# Patient Record
Sex: MALE | Race: WHITE | NOT HISPANIC OR LATINO | Employment: OTHER | URBAN - METROPOLITAN AREA
[De-identification: names, ages, dates, MRNs, and addresses within clinical notes are randomized per-mention and may not be internally consistent; named-entity substitution may affect disease eponyms.]

---

## 2017-05-23 DIAGNOSIS — N52.9 ERECTILE DYSFUNCTION, UNSPECIFIED ERECTILE DYSFUNCTION TYPE: Primary | ICD-10-CM

## 2017-05-24 DIAGNOSIS — N52.9 ERECTILE DYSFUNCTION, UNSPECIFIED ERECTILE DYSFUNCTION TYPE: Primary | ICD-10-CM

## 2017-05-24 RX ORDER — TADALAFIL 20 MG/1
20 TABLET ORAL DAILY PRN
Qty: 10 TABLET | Refills: 11 | Status: SHIPPED | OUTPATIENT
Start: 2017-05-24 | End: 2017-07-18

## 2017-05-24 RX ORDER — SILDENAFIL CITRATE 100 MG/1
100 TABLET, FILM COATED ORAL DAILY PRN
Qty: 10 TABLET | Refills: 11 | Status: SHIPPED | OUTPATIENT
Start: 2017-05-24 | End: 2017-05-24 | Stop reason: SDUPTHER

## 2017-05-24 RX ORDER — TADALAFIL 20 MG/1
20 TABLET ORAL DAILY PRN
Qty: 10 TABLET | Refills: 11 | Status: CANCELLED | OUTPATIENT
Start: 2017-05-24 | End: 2018-05-24

## 2017-05-24 NOTE — TELEPHONE ENCOUNTER
Spoke to pt.  Insurance will not cover viagra, but will cover cialis 20 mg.  I called [facility] and cancelled viagra

## 2017-07-18 ENCOUNTER — OFFICE VISIT (OUTPATIENT)
Dept: UROLOGY | Facility: CLINIC | Age: 62
End: 2017-07-18
Attending: UROLOGY
Payer: COMMERCIAL

## 2017-07-18 VITALS
HEART RATE: 60 BPM | DIASTOLIC BLOOD PRESSURE: 81 MMHG | BODY MASS INDEX: 28.63 KG/M2 | HEIGHT: 70 IN | WEIGHT: 200 LBS | SYSTOLIC BLOOD PRESSURE: 134 MMHG

## 2017-07-18 DIAGNOSIS — N52.9 ERECTILE DYSFUNCTION, UNSPECIFIED ERECTILE DYSFUNCTION TYPE: Primary | ICD-10-CM

## 2017-07-18 DIAGNOSIS — N40.0 ENLARGED PROSTATE WITHOUT LOWER URINARY TRACT SYMPTOMS (LUTS): ICD-10-CM

## 2017-07-18 PROCEDURE — 99214 OFFICE O/P EST MOD 30 MIN: CPT | Mod: S$GLB,,, | Performed by: UROLOGY

## 2017-07-18 PROCEDURE — 99999 PR PBB SHADOW E&M-EST. PATIENT-LVL III: CPT | Mod: PBBFAC,,, | Performed by: UROLOGY

## 2017-07-18 RX ORDER — SILDENAFIL 100 MG/1
100 TABLET, FILM COATED ORAL DAILY PRN
Qty: 10 TABLET | Refills: 11 | Status: SHIPPED | OUTPATIENT
Start: 2017-07-18 | End: 2018-06-25 | Stop reason: SDUPTHER

## 2017-07-18 RX ORDER — OLMESARTAN MEDOXOMIL 40 MG/1
40 TABLET, FILM COATED ORAL NIGHTLY
Refills: 3 | COMMUNITY
Start: 2017-06-21 | End: 2019-09-23 | Stop reason: SDUPTHER

## 2017-07-18 NOTE — PROGRESS NOTES
"Subjective:      Giovanni Moncada is a 61 y.o. male who returns today regarding his     No urinary complaints.  The patient has tried Viagra 100 mg and this works well.  He prefers this to Cialis..    The following portions of the patient's history were reviewed and updated as appropriate: allergies, current medications, past family history, past medical history, past social history, past surgical history and problem list.    Review of Systems  Pertinent items are noted in HPI.  A comprehensive multipoint review of systems was negative except as otherwise stated in the HPI.     Objective:   Vitals: /81 (BP Location: Right arm, Patient Position: Sitting, BP Method: Automatic)   Pulse 60   Ht 5' 10" (1.778 m)   Wt 90.7 kg (200 lb)   BMI 28.70 kg/m²     Physical Exam   General: alert and oriented, no acute distress  Respiratory: Symmetric expansion, non-labored breathing  Cardiovascular: no peripheral edema  Abdomen:  non distended  Skin: normal coloration and turgor, no rashes, no suspicious skin lesions noted  Neuro: no gross deficits  Psych: normal judgment and insight, normal mood/affect and non-anxious  Prostate 35-40 g no nodules  Physical Exam    Lab Review   Urinalysis demonstrates no specimen    PSA 1.1, last year 0.95  No results found for: WBC, HGB, HCT, MCV, PLT  No results found for: CREATININE, BUN    Imaging  -  Assessment and Plan:   Erectile dysfunction, unspecified erectile dysfunction type  -     Prostate Specific Antigen, Diagnostic; Future; Expected date: 07/16/2018    Enlarged prostate without lower urinary tract symptoms (luts)  -     Prostate Specific Antigen, Diagnostic; Future; Expected date: 07/16/2018    Other orders  -     sildenafil (VIAGRA) 100 MG tablet; Take 1 tablet (100 mg total) by mouth daily as needed for Erectile Dysfunction.  Dispense: 10 tablet; Refill: 11      returned to clinic 1 year with PSA    "

## 2017-07-18 NOTE — PATIENT INSTRUCTIONS
Sildenafil tablets (Viagra)  What is this medicine?  SILDENAFIL (abida DEN a silvio) is used to treat erection problems in men.  How should I use this medicine?  Take this medicine by mouth with a glass of water. Follow the directions on the prescription label. The dose is usually taken 1 hour before sexual activity. You should not take the dose more than once per day. Do not take your medicine more often than directed.  Talk to your pediatrician regarding the use of this medicine in children. This medicine is not used in children for this condition.  What side effects may I notice from receiving this medicine?  Side effects that you should report to your doctor or health care professional as soon as possible:  · allergic reactions like skin rash, itching or hives, swelling of the face, lips, or tongue  · breathing problems  · changes in hearing  · changes in vision  · chest pain  · fast, irregular heartbeat  · prolonged or painful erection  · seizures  Side effects that usually do not require medical attention (report to your doctor or health care professional if they continue or are bothersome):  · back pain  · dizziness  · flushing  · headache  · indigestion  · muscle aches  · nausea  · stuffy or runny nose  What may interact with this medicine?  Do not take this medicine with any of the following medications:  · cisapride  · methscopolamine nitrate  · nitrates like amyl nitrite, isosorbide dinitrate, isosorbide mononitrate, nitroglycerin  · nitroprusside  · other medicines for erectile dysfunction like avanafil, tadalafil, vardenafil  · riociguat  · other sildenafil products (Revatio)  This medicine may also interact with the following medications:  · certain drugs for high blood pressure  · certain drugs for the treatment of HIV infection or AIDS  · certain drugs used for fungal or yeast infections, like fluconazole, itraconazole, ketoconazole, and voriconazole  · cimetidine  · erythromycin  · rifampin  What if I  miss a dose?  This does not apply. Do not take double or extra doses.  Where should I keep my medicine?  Keep out of reach of children.  Store at room temperature between 15 and 30 degrees C (59 and 86 degrees F). Throw away any unused medicine after the expiration date.  What should I tell my health care provider before I take this medicine?  They need to know if you have any of these conditions:  · bleeding disorders  · eye or vision problems, including a rare inherited eye disease called retinitis pigmentosa  · anatomical deformation of the penis, Peyronie's disease, or history of priapism (painful and prolonged erection)  · heart disease, angina, a history of heart attack, irregular heart beats, or other heart problems  · high or low blood pressure  · history of blood diseases, like sickle cell anemia or leukemia  · history of stomach bleeding  · kidney disease  · liver disease  · stroke  · an unusual or allergic reaction to sildenafil, other medicines, foods, dyes, or preservatives  · pregnant or trying to get pregnant  · breast-feeding  What should I watch for while using this medicine?  If you notice any changes in your vision while taking this drug, call your doctor or health care professional as soon as possible. Stop using this medicine and call your health care provider right away if you have a loss of sight in one or both eyes.  Contact your doctor or health care professional right away if you have an erection that lasts longer than 4 hours or if it becomes painful. This may be a sign of a serious problem and must be treated right away to prevent permanent damage.  If you experience symptoms of nausea, dizziness, chest pain or arm pain upon initiation of sexual activity after taking this medicine, you should refrain from further activity and call your doctor or health care professional as soon as possible.  Do not drink alcohol to excess (examples, 5 glasses of wine or 5 shots of whiskey) when taking  this medicine. When taken in excess, alcohol can increase your chances of getting a headache or getting dizzy, increasing your heart rate or lowering your blood pressure.  Using this medicine does not protect you or your partner against HIV infection (the virus that causes AIDS) or other sexually transmitted diseases.  Date Last Reviewed:   NOTE:This sheet is a summary. It may not cover all possible information. If you have questions about this medicine, talk to your doctor, pharmacist, or health care provider. Copyright© 2016 [employer]

## 2017-08-24 ENCOUNTER — TELEPHONE (OUTPATIENT)
Dept: UROLOGY | Facility: CLINIC | Age: 62
End: 2017-08-24

## 2017-08-24 NOTE — TELEPHONE ENCOUNTER
Spoke to the patient and he states that his cardio doctor gave him cialis so disregard the PA for viagra/ds

## 2018-04-23 ENCOUNTER — TELEPHONE (OUTPATIENT)
Dept: UROLOGY | Facility: CLINIC | Age: 63
End: 2018-04-23

## 2018-04-23 DIAGNOSIS — N40.0 BENIGN PROSTATIC HYPERPLASIA, UNSPECIFIED WHETHER LOWER URINARY TRACT SYMPTOMS PRESENT: Primary | ICD-10-CM

## 2018-04-23 NOTE — TELEPHONE ENCOUNTER
----- Message from Jesse Villafana sent at 4/23/2018  3:37 PM CDT -----  Contact: Kirsten (daughter)    Name of Who is Calling: Kirsten (daughter)      What is the request in detail: Patient would like to speak with staff to have is PSA orders put in for his yearly appointment in July      Can the clinic reply by [facility]: no      What Number to Call Back if not in [facility]: [phone] or [phone]

## 2018-06-25 RX ORDER — TADALAFIL 20 MG/1
TABLET, FILM COATED ORAL
Qty: 10 TABLET | Refills: 11 | Status: SHIPPED | OUTPATIENT
Start: 2018-06-25 | End: 2018-07-20 | Stop reason: SDUPTHER

## 2018-07-16 ENCOUNTER — LAB VISIT (OUTPATIENT)
Dept: LAB | Facility: HOSPITAL | Age: 63
End: 2018-07-16
Attending: UROLOGY
Payer: COMMERCIAL

## 2018-07-16 DIAGNOSIS — N40.0 BENIGN PROSTATIC HYPERPLASIA, UNSPECIFIED WHETHER LOWER URINARY TRACT SYMPTOMS PRESENT: ICD-10-CM

## 2018-07-16 LAB — COMPLEXED PSA SERPL-MCNC: 1.5 NG/ML

## 2018-07-16 PROCEDURE — 36415 COLL VENOUS BLD VENIPUNCTURE: CPT | Mod: PO

## 2018-07-16 PROCEDURE — 84153 ASSAY OF PSA TOTAL: CPT

## 2018-07-20 ENCOUNTER — OFFICE VISIT (OUTPATIENT)
Dept: UROLOGY | Facility: CLINIC | Age: 63
End: 2018-07-20
Attending: UROLOGY
Payer: COMMERCIAL

## 2018-07-20 VITALS
BODY MASS INDEX: 28.14 KG/M2 | DIASTOLIC BLOOD PRESSURE: 78 MMHG | HEART RATE: 55 BPM | SYSTOLIC BLOOD PRESSURE: 130 MMHG | WEIGHT: 190 LBS | HEIGHT: 69 IN

## 2018-07-20 DIAGNOSIS — N40.0 ENLARGED PROSTATE WITHOUT LOWER URINARY TRACT SYMPTOMS (LUTS): Primary | ICD-10-CM

## 2018-07-20 DIAGNOSIS — N52.9 ERECTILE DYSFUNCTION, UNSPECIFIED ERECTILE DYSFUNCTION TYPE: ICD-10-CM

## 2018-07-20 PROCEDURE — 99214 OFFICE O/P EST MOD 30 MIN: CPT | Mod: S$GLB,,, | Performed by: UROLOGY

## 2018-07-20 PROCEDURE — 3008F BODY MASS INDEX DOCD: CPT | Mod: CPTII,S$GLB,, | Performed by: UROLOGY

## 2018-07-20 RX ORDER — ASPIRIN 81 MG/1
81 TABLET ORAL DAILY
COMMUNITY

## 2018-07-20 RX ORDER — AMLODIPINE BESYLATE 5 MG/1
5 TABLET ORAL EVERY MORNING
Refills: 12 | COMMUNITY
Start: 2018-06-25 | End: 2019-09-23 | Stop reason: SDUPTHER

## 2018-07-20 RX ORDER — SILDENAFIL 100 MG/1
100 TABLET, FILM COATED ORAL DAILY PRN
Qty: 12 TABLET | Refills: 11 | Status: SHIPPED | OUTPATIENT
Start: 2018-07-20 | End: 2019-05-21 | Stop reason: SDUPTHER

## 2018-07-20 RX ORDER — DOXAZOSIN 2 MG/1
1 TABLET ORAL NIGHTLY
Refills: 12 | COMMUNITY
Start: 2018-05-17 | End: 2019-09-23 | Stop reason: SDUPTHER

## 2018-07-20 NOTE — PROGRESS NOTES
"Subjective:      Giovanni Moncada is a 62 y.o. male who returns today regarding his     Urgency with caffeine    Otherwise no complaints    Requests viagra 100mg for ED.    Ejaculated prior to recent blood draw    The following portions of the patient's history were reviewed and updated as appropriate: allergies, current medications, past family history, past medical history, past social history, past surgical history and problem list.    Review of Systems  Pertinent items are noted in HPI.  A comprehensive multipoint review of systems was negative except as otherwise stated in the HPI.     Objective:   Vitals: /78   Pulse (!) 55   Ht 5' 9" (1.753 m)   Wt 86.2 kg (190 lb)   BMI 28.06 kg/m²     Physical Exam   General: alert and oriented, no acute distress  Respiratory: Symmetric expansion, non-labored breathing  Cardiovascular: no peripheral edema  Abdomen: non distended -  Skin: normal coloration and turgor, no rashes, no suspicious skin lesions noted  Neuro: no gross deficits  Psych: normal judgment and insight, normal mood/affect and non-anxious  ES 25g no nodules    Physical Exam    Lab Review   Urinalysis demonstrates no specimen    Lab Results   Component Value Date    PSADIAG 1.5 07/16/2018    PSADIAG 0.95 09/23/2016       No results found for: WBC, HGB, HCT, MCV, PLT  No results found for: CREATININE, BUN    Imaging  -  Assessment and Plan:   Enlarged prostate without lower urinary tract symptoms (luts)  -     Prostate Specific Antigen, Diagnostic; Future; Expected date: 01/16/2019    Erectile dysfunction, unspecified erectile dysfunction type  -     Prostate Specific Antigen, Diagnostic; Future; Expected date: 01/16/2019    Other orders  -     sildenafil (VIAGRA) 100 MG tablet; Take 1 tablet (100 mg total) by mouth daily as needed for Erectile Dysfunction.  Dispense: 12 tablet; Refill: 11      rtc 6 months with psa  aviod ejaculating 3 days prior to psa  If PSA ok in 6 months rtc yearly with " psa

## 2019-05-21 ENCOUNTER — OFFICE VISIT (OUTPATIENT)
Dept: UROLOGY | Facility: CLINIC | Age: 64
End: 2019-05-21
Payer: COMMERCIAL

## 2019-05-21 VITALS
HEART RATE: 60 BPM | DIASTOLIC BLOOD PRESSURE: 75 MMHG | WEIGHT: 190 LBS | HEIGHT: 69 IN | BODY MASS INDEX: 28.14 KG/M2 | SYSTOLIC BLOOD PRESSURE: 131 MMHG

## 2019-05-21 DIAGNOSIS — N40.0 ENLARGED PROSTATE WITHOUT LOWER URINARY TRACT SYMPTOMS (LUTS): Primary | ICD-10-CM

## 2019-05-21 DIAGNOSIS — N52.9 ERECTILE DYSFUNCTION, UNSPECIFIED ERECTILE DYSFUNCTION TYPE: ICD-10-CM

## 2019-05-21 LAB
BILIRUB SERPL-MCNC: NORMAL MG/DL
BLOOD URINE, POC: NORMAL
COLOR, POC UA: NORMAL
GLUCOSE UR QL STRIP: NORMAL
KETONES UR QL STRIP: NORMAL
LEUKOCYTE ESTERASE URINE, POC: NORMAL
NITRITE, POC UA: NORMAL
PH, POC UA: 5
POC RESIDUAL URINE VOLUME: 17 ML (ref 0–100)
PROTEIN, POC: NORMAL
SPECIFIC GRAVITY, POC UA: 1.02
UROBILINOGEN, POC UA: NORMAL

## 2019-05-21 PROCEDURE — 99214 OFFICE O/P EST MOD 30 MIN: CPT | Mod: 25,S$GLB,, | Performed by: UROLOGY

## 2019-05-21 PROCEDURE — 81002 POCT URINE DIPSTICK WITHOUT MICROSCOPE: ICD-10-PCS | Mod: S$GLB,,, | Performed by: UROLOGY

## 2019-05-21 PROCEDURE — 51798 US URINE CAPACITY MEASURE: CPT | Mod: S$GLB,,, | Performed by: UROLOGY

## 2019-05-21 PROCEDURE — 3008F BODY MASS INDEX DOCD: CPT | Mod: CPTII,S$GLB,, | Performed by: UROLOGY

## 2019-05-21 PROCEDURE — 51798 POCT BLADDER SCAN: ICD-10-PCS | Mod: S$GLB,,, | Performed by: UROLOGY

## 2019-05-21 PROCEDURE — 3008F PR BODY MASS INDEX (BMI) DOCUMENTED: ICD-10-PCS | Mod: CPTII,S$GLB,, | Performed by: UROLOGY

## 2019-05-21 PROCEDURE — 81002 URINALYSIS NONAUTO W/O SCOPE: CPT | Mod: S$GLB,,, | Performed by: UROLOGY

## 2019-05-21 PROCEDURE — 99214 PR OFFICE/OUTPT VISIT, EST, LEVL IV, 30-39 MIN: ICD-10-PCS | Mod: 25,S$GLB,, | Performed by: UROLOGY

## 2019-05-21 RX ORDER — PROMETHAZINE HYDROCHLORIDE 25 MG/1
TABLET ORAL
Refills: 0 | COMMUNITY
Start: 2019-04-16 | End: 2019-05-21

## 2019-05-21 RX ORDER — SILDENAFIL 100 MG/1
100 TABLET, FILM COATED ORAL DAILY PRN
Qty: 12 TABLET | Refills: 11 | Status: SHIPPED | OUTPATIENT
Start: 2019-05-21 | End: 2019-09-23

## 2019-05-21 RX ORDER — SULFAMETHOXAZOLE AND TRIMETHOPRIM 800; 160 MG/1; MG/1
1 TABLET ORAL 2 TIMES DAILY WITH MEALS
Refills: 0 | COMMUNITY
Start: 2019-04-16 | End: 2019-05-21

## 2019-05-21 RX ORDER — IBUPROFEN 100 MG/5ML
1000 SUSPENSION, ORAL (FINAL DOSE FORM) ORAL
COMMUNITY
Start: 2015-12-23

## 2019-05-21 RX ORDER — GLUCOSAMINE/CHONDROITIN/C/MANG 500-400 MG
CAPSULE ORAL
COMMUNITY
Start: 2014-02-25

## 2019-05-21 RX ORDER — OXYCODONE AND ACETAMINOPHEN 10; 325 MG/1; MG/1
TABLET ORAL
Refills: 0 | COMMUNITY
Start: 2019-04-16 | End: 2019-05-21

## 2019-05-21 RX ORDER — LANOLIN ALCOHOL/MO/W.PET/CERES
100 CREAM (GRAM) TOPICAL DAILY
COMMUNITY

## 2019-05-21 RX ORDER — METHOCARBAMOL 750 MG/1
TABLET, FILM COATED ORAL
Refills: 0 | COMMUNITY
Start: 2019-04-16 | End: 2019-05-21

## 2019-05-21 RX ORDER — ACETAMINOPHEN 500 MG
TABLET ORAL
COMMUNITY
Start: 2015-12-23

## 2019-05-21 NOTE — PROGRESS NOTES
"Subjective:      Giovanni Moncada is a 63 y.o. male who returns today regarding his     Erectile dysfunction.  Sildenafil 100 mg provides adequate but not ideal erections.  He tried Cialis in the past with poor results    Two episodes of urinary frequency but currently he has none.  No complaints at this time    The following portions of the patient's history were reviewed and updated as appropriate: allergies, current medications, past family history, past medical history, past social history, past surgical history and problem list.    Review of Systems  Pertinent items are noted in HPI.  A comprehensive multipoint review of systems was negative except as otherwise stated in the HPI.     Objective:   Vitals: /75 (BP Location: Left arm, Patient Position: Sitting, BP Method: Large (Automatic))   Pulse 60   Ht 5' 9" (1.753 m)   Wt 86.2 kg (190 lb)   BMI 28.06 kg/m²     Physical Exam   General: alert and oriented, no acute distress  Respiratory: Symmetric expansion, non-labored breathing  Cardiovascular: normal to inspection  Abdomen: non distended   Skin: normal coloration and turgor, no rashes, no suspicious skin lesions noted  Neuro: no gross deficits  Psych: normal judgment and insight, normal mood/affect and non-anxious    Physical Exam    Lab Review   Urinalysis demonstrates negative for all components  No results found for: WBC, HGB, HCT, MCV, PLT  No results found for: CREATININE, BUN    PSA 1.08 March 2019    Imaging  Postvoid residual 17 cc  Assessment and Plan:   Enlarged prostate without lower urinary tract symptoms (luts)    Erectile dysfunction, unspecified erectile dysfunction type     Continue Viagra 100 mg p.r.n.  Return to clinic 1 year with PSA for prostate exam    "

## 2019-09-23 ENCOUNTER — LAB VISIT (OUTPATIENT)
Dept: LAB | Facility: HOSPITAL | Age: 64
End: 2019-09-23
Attending: INTERNAL MEDICINE
Payer: COMMERCIAL

## 2019-09-23 ENCOUNTER — OFFICE VISIT (OUTPATIENT)
Dept: PRIMARY CARE CLINIC | Facility: CLINIC | Age: 64
End: 2019-09-23
Payer: COMMERCIAL

## 2019-09-23 VITALS
HEART RATE: 61 BPM | BODY MASS INDEX: 28.87 KG/M2 | DIASTOLIC BLOOD PRESSURE: 80 MMHG | SYSTOLIC BLOOD PRESSURE: 112 MMHG | OXYGEN SATURATION: 95 % | HEIGHT: 69 IN | TEMPERATURE: 98 F | WEIGHT: 194.88 LBS

## 2019-09-23 DIAGNOSIS — Z85.828 HISTORY OF SCC (SQUAMOUS CELL CARCINOMA) OF SKIN: ICD-10-CM

## 2019-09-23 DIAGNOSIS — Z00.00 ANNUAL PHYSICAL EXAM: ICD-10-CM

## 2019-09-23 DIAGNOSIS — Z11.4 SCREENING FOR HIV (HUMAN IMMUNODEFICIENCY VIRUS): ICD-10-CM

## 2019-09-23 DIAGNOSIS — I10 ESSENTIAL HYPERTENSION: Primary | ICD-10-CM

## 2019-09-23 DIAGNOSIS — N52.9 ERECTILE DYSFUNCTION, UNSPECIFIED ERECTILE DYSFUNCTION TYPE: ICD-10-CM

## 2019-09-23 DIAGNOSIS — K21.9 GERD WITHOUT ESOPHAGITIS: ICD-10-CM

## 2019-09-23 DIAGNOSIS — I49.9 CARDIAC ARRHYTHMIA, UNSPECIFIED CARDIAC ARRHYTHMIA TYPE: ICD-10-CM

## 2019-09-23 DIAGNOSIS — N40.0 BENIGN PROSTATIC HYPERPLASIA WITHOUT LOWER URINARY TRACT SYMPTOMS: ICD-10-CM

## 2019-09-23 DIAGNOSIS — E78.2 HYPERLIPIDEMIA, MIXED: ICD-10-CM

## 2019-09-23 DIAGNOSIS — I10 ESSENTIAL HYPERTENSION: ICD-10-CM

## 2019-09-23 LAB
ALBUMIN SERPL BCP-MCNC: 4.2 G/DL (ref 3.5–5.2)
ALP SERPL-CCNC: 83 U/L (ref 55–135)
ALT SERPL W/O P-5'-P-CCNC: 15 U/L (ref 10–44)
ANION GAP SERPL CALC-SCNC: 7 MMOL/L (ref 8–16)
AST SERPL-CCNC: 23 U/L (ref 10–40)
BASOPHILS # BLD AUTO: 0.04 K/UL (ref 0–0.2)
BASOPHILS NFR BLD: 0.6 % (ref 0–1.9)
BILIRUB SERPL-MCNC: 0.4 MG/DL (ref 0.1–1)
BUN SERPL-MCNC: 17 MG/DL (ref 8–23)
CALCIUM SERPL-MCNC: 9.5 MG/DL (ref 8.7–10.5)
CHLORIDE SERPL-SCNC: 110 MMOL/L (ref 95–110)
CHOLEST SERPL-MCNC: 148 MG/DL (ref 120–199)
CHOLEST/HDLC SERPL: 2.9 {RATIO} (ref 2–5)
CO2 SERPL-SCNC: 24 MMOL/L (ref 23–29)
CREAT SERPL-MCNC: 1 MG/DL (ref 0.5–1.4)
DIFFERENTIAL METHOD: ABNORMAL
EOSINOPHIL # BLD AUTO: 0.1 K/UL (ref 0–0.5)
EOSINOPHIL NFR BLD: 1.9 % (ref 0–8)
ERYTHROCYTE [DISTWIDTH] IN BLOOD BY AUTOMATED COUNT: 12.9 % (ref 11.5–14.5)
EST. GFR  (AFRICAN AMERICAN): >60 ML/MIN/1.73 M^2
EST. GFR  (NON AFRICAN AMERICAN): >60 ML/MIN/1.73 M^2
GLUCOSE SERPL-MCNC: 110 MG/DL (ref 70–110)
HCT VFR BLD AUTO: 44.3 % (ref 40–54)
HDLC SERPL-MCNC: 51 MG/DL (ref 40–75)
HDLC SERPL: 34.5 % (ref 20–50)
HGB BLD-MCNC: 14.1 G/DL (ref 14–18)
HIV 1+2 AB+HIV1 P24 AG SERPL QL IA: NEGATIVE
IMM GRANULOCYTES # BLD AUTO: 0.02 K/UL (ref 0–0.04)
IMM GRANULOCYTES NFR BLD AUTO: 0.3 % (ref 0–0.5)
LDLC SERPL CALC-MCNC: 76.8 MG/DL (ref 63–159)
LYMPHOCYTES # BLD AUTO: 1.4 K/UL (ref 1–4.8)
LYMPHOCYTES NFR BLD: 22.1 % (ref 18–48)
MCH RBC QN AUTO: 29.7 PG (ref 27–31)
MCHC RBC AUTO-ENTMCNC: 31.8 G/DL (ref 32–36)
MCV RBC AUTO: 94 FL (ref 82–98)
MONOCYTES # BLD AUTO: 0.9 K/UL (ref 0.3–1)
MONOCYTES NFR BLD: 14.4 % (ref 4–15)
NEUTROPHILS # BLD AUTO: 3.8 K/UL (ref 1.8–7.7)
NEUTROPHILS NFR BLD: 60.7 % (ref 38–73)
NONHDLC SERPL-MCNC: 97 MG/DL
NRBC BLD-RTO: 0 /100 WBC
PLATELET # BLD AUTO: 167 K/UL (ref 150–350)
PMV BLD AUTO: 12.2 FL (ref 9.2–12.9)
POTASSIUM SERPL-SCNC: 4.2 MMOL/L (ref 3.5–5.1)
PROT SERPL-MCNC: 6.8 G/DL (ref 6–8.4)
RBC # BLD AUTO: 4.74 M/UL (ref 4.6–6.2)
SODIUM SERPL-SCNC: 141 MMOL/L (ref 136–145)
T4 FREE SERPL-MCNC: 0.84 NG/DL (ref 0.71–1.51)
TRIGL SERPL-MCNC: 101 MG/DL (ref 30–150)
TSH SERPL DL<=0.005 MIU/L-ACNC: 2.58 UIU/ML (ref 0.4–4)
WBC # BLD AUTO: 6.25 K/UL (ref 3.9–12.7)

## 2019-09-23 PROCEDURE — 1101F PR PT FALLS ASSESS DOC 0-1 FALLS W/OUT INJ PAST YR: ICD-10-PCS | Mod: CPTII,S$GLB,, | Performed by: INTERNAL MEDICINE

## 2019-09-23 PROCEDURE — 99214 OFFICE O/P EST MOD 30 MIN: CPT | Mod: S$GLB,,, | Performed by: INTERNAL MEDICINE

## 2019-09-23 PROCEDURE — 80061 LIPID PANEL: CPT

## 2019-09-23 PROCEDURE — 1101F PT FALLS ASSESS-DOCD LE1/YR: CPT | Mod: CPTII,S$GLB,, | Performed by: INTERNAL MEDICINE

## 2019-09-23 PROCEDURE — 84443 ASSAY THYROID STIM HORMONE: CPT

## 2019-09-23 PROCEDURE — 3008F PR BODY MASS INDEX (BMI) DOCUMENTED: ICD-10-PCS | Mod: CPTII,S$GLB,, | Performed by: INTERNAL MEDICINE

## 2019-09-23 PROCEDURE — 84439 ASSAY OF FREE THYROXINE: CPT

## 2019-09-23 PROCEDURE — 99999 PR PBB SHADOW E&M-EST. PATIENT-LVL III: ICD-10-PCS | Mod: PBBFAC,,, | Performed by: INTERNAL MEDICINE

## 2019-09-23 PROCEDURE — 3008F BODY MASS INDEX DOCD: CPT | Mod: CPTII,S$GLB,, | Performed by: INTERNAL MEDICINE

## 2019-09-23 PROCEDURE — 99999 PR PBB SHADOW E&M-EST. PATIENT-LVL III: CPT | Mod: PBBFAC,,, | Performed by: INTERNAL MEDICINE

## 2019-09-23 PROCEDURE — 80053 COMPREHEN METABOLIC PANEL: CPT

## 2019-09-23 PROCEDURE — 85025 COMPLETE CBC W/AUTO DIFF WBC: CPT

## 2019-09-23 PROCEDURE — 86703 HIV-1/HIV-2 1 RESULT ANTBDY: CPT

## 2019-09-23 PROCEDURE — 99214 PR OFFICE/OUTPT VISIT, EST, LEVL IV, 30-39 MIN: ICD-10-PCS | Mod: S$GLB,,, | Performed by: INTERNAL MEDICINE

## 2019-09-23 PROCEDURE — 36415 COLL VENOUS BLD VENIPUNCTURE: CPT | Mod: PN

## 2019-09-23 RX ORDER — ACEBUTOLOL HYDROCHLORIDE 200 MG/1
200 CAPSULE ORAL 2 TIMES DAILY
Qty: 180 CAPSULE | Refills: 3 | Status: SHIPPED | OUTPATIENT
Start: 2019-09-23 | End: 2020-09-11 | Stop reason: SDUPTHER

## 2019-09-23 RX ORDER — OMEGA-3-ACID ETHYL ESTERS 1 G/1
2 CAPSULE, LIQUID FILLED ORAL 2 TIMES DAILY
Qty: 180 CAPSULE | Refills: 3 | Status: SHIPPED | OUTPATIENT
Start: 2019-09-23 | End: 2020-10-06

## 2019-09-23 RX ORDER — OMEPRAZOLE 20 MG/1
20 CAPSULE, DELAYED RELEASE ORAL DAILY
Qty: 90 CAPSULE | Refills: 3 | Status: SHIPPED | OUTPATIENT
Start: 2019-09-23 | End: 2020-09-11 | Stop reason: SDUPTHER

## 2019-09-23 RX ORDER — ATORVASTATIN CALCIUM 10 MG/1
10 TABLET, FILM COATED ORAL NIGHTLY
Qty: 90 TABLET | Refills: 3 | Status: SHIPPED | OUTPATIENT
Start: 2019-09-23 | End: 2020-09-11 | Stop reason: SDUPTHER

## 2019-09-23 RX ORDER — VARDENAFIL HYDROCHLORIDE 20 MG/1
20 TABLET ORAL DAILY PRN
Qty: 10 TABLET | Refills: 5 | Status: SHIPPED | OUTPATIENT
Start: 2019-09-23 | End: 2020-07-07

## 2019-09-23 RX ORDER — DOXAZOSIN 2 MG/1
1 TABLET ORAL NIGHTLY
Qty: 90 TABLET | Refills: 3 | Status: SHIPPED | OUTPATIENT
Start: 2019-09-23 | End: 2020-09-11 | Stop reason: SDUPTHER

## 2019-09-23 RX ORDER — OLMESARTAN MEDOXOMIL 40 MG/1
40 TABLET ORAL NIGHTLY
Qty: 90 TABLET | Refills: 3 | Status: SHIPPED | OUTPATIENT
Start: 2019-09-23 | End: 2020-09-11 | Stop reason: SDUPTHER

## 2019-09-23 RX ORDER — AMLODIPINE BESYLATE 5 MG/1
5 TABLET ORAL EVERY MORNING
Qty: 90 TABLET | Refills: 3 | Status: SHIPPED | OUTPATIENT
Start: 2019-09-23 | End: 2020-09-11 | Stop reason: SDUPTHER

## 2019-09-23 RX ORDER — ATORVASTATIN CALCIUM 10 MG/1
10 TABLET, FILM COATED ORAL NIGHTLY
Refills: 3 | COMMUNITY
Start: 2019-09-16 | End: 2019-09-23 | Stop reason: SDUPTHER

## 2019-09-23 NOTE — PROGRESS NOTES
[facility] Note    Chief Complaint      Chief Complaint   Patient presents with    Follow-up     6 months    Hypertension       History of Present Illness      Giovanni Moncada is a 64 y.o. male with chronic conditions of HTN, HLD, GERD, ED who presents today for: re-establish care and review chronic conditions.  HTN: BP at goal on benicar, amlodipine, acebutolol, doxazosin.  HLD: controlled on atorvastatin.  LDL requested.  ED: Sees Dr. Aquino, urology.  On viagra.  GERD: Controlled on omeprazole.  No new or worsening symptoms.     Cardiac arrhythmia: Previous diagnosed during stress test.  On acebutolol for rate control.    Past Medical History:  Past Medical History:   Diagnosis Date    Hypercholesteremia     Hypertension        Past Surgical History:   has a past surgical history that includes Neck surgery.    Family History:  family history includes Cancer in his mother; Heart disease in his father.     Social History:  Social History     Tobacco Use    Smoking status: Never Smoker    Smokeless tobacco: Never Used   Substance Use Topics    Alcohol use: Yes    Drug use: No       Review of Systems   Constitutional: Negative for chills, fever and malaise/fatigue.   Respiratory: Negative for shortness of breath.    Cardiovascular: Negative for chest pain.   Gastrointestinal: Negative for constipation, diarrhea, nausea and vomiting.   Skin: Negative for rash.   Neurological: Negative for weakness.        Medications:  Outpatient Encounter Medications as of 9/23/2019   Medication Sig Note Dispense Refill    acebutolol (SECTRAL) 200 MG capsule Take 1 capsule (200 mg total) by mouth 2 (two) times daily.  180 capsule 3    amLODIPine (NORVASC) 5 MG tablet Take 1 tablet (5 mg total) by mouth every morning.  90 tablet 3    ascorbic acid, vitamin C, (VITAMIN C WITH DADA HIPS) 1000 MG tablet Take 1,000 mg by mouth.       aspirin (ECOTRIN) 81 MG EC tablet Take 81 mg by mouth 2 (two) times  daily.       atorvastatin (LIPITOR) 10 MG tablet Take 1 tablet (10 mg total) by mouth nightly.  90 tablet 3    bacillus coagulans-inulin (PROBIOTIC FORMULA, INULIN,) 1 billion-250 cell-mg Cap Take by mouth.       cholecalciferol, vitamin D3, 5,000 unit Tab Take by mouth.       coenzyme Q10 (CO Q-10) 100 mg capsule Take 100 mg by mouth once daily.       cyanocobalamin (VITAMIN B-12) 1000 MCG tablet Take 100 mcg by mouth once daily.       doxazosin (CARDURA) 2 MG tablet Take 0.5 tablets (1 mg total) by mouth nightly.  90 tablet 3    glucosamine-chondroit-vit C-Mn (GLUCOSAMINE 1500 COMPLEX) 500-400 mg Cap Take by mouth.       GLUCOSAMINE/CHONDR MARI A SOD (OSTEO BI-FLEX ORAL) Take by mouth.       olmesartan (BENICAR) 40 MG tablet Take 1 tablet (40 mg total) by mouth every evening.  90 tablet 3    omega-3 acid ethyl esters (LOVAZA) 1 gram capsule Take 2 capsules (2 g total) by mouth 2 (two) times daily.  180 capsule 3    omeprazole (PRILOSEC) 20 MG capsule Take 1 capsule (20 mg total) by mouth once daily.  90 capsule 3    [DISCONTINUED] acebutolol (SECTRAL) 200 MG capsule Take 200 mg by mouth 2 (two) times daily.       [DISCONTINUED] amLODIPine (NORVASC) 5 MG tablet Take 5 mg by mouth every morning.   12    [DISCONTINUED] atorvastatin (LIPITOR) 10 MG tablet Take 10 mg by mouth nightly.   3    [DISCONTINUED] BENICAR 40 mg tablet Take 40 mg by mouth every evening. 7/18/2017: Received from: External Pharmacy Received Sig: TAKE 1 TABLET BY MOUTH AT BEDTIME  3    [DISCONTINUED] doxazosin (CARDURA) 2 MG tablet Take 1 mg by mouth nightly.    12    [DISCONTINUED] omega-3 acid ethyl esters (LOVAZA) 1 gram capsule Take 2 g by mouth 2 (two) times daily.       [DISCONTINUED] omeprazole (PRILOSEC) 20 MG capsule Take 20 mg by mouth once daily. 9/27/2016: Received from: External Pharmacy  3    [DISCONTINUED] sildenafil (VIAGRA) 100 MG tablet Take 1 tablet (100 mg total) by mouth daily as needed for Erectile  "Dysfunction.  12 tablet 11    vardenafil (LEVITRA) 20 MG tablet Take 1 tablet (20 mg total) by mouth daily as needed for Erectile Dysfunction.  10 tablet 5    [DISCONTINUED] atorvastatin (LIPITOR) 20 MG tablet Take 10 mg by mouth once daily.         No facility-administered encounter medications on file as of 9/23/2019.        Allergies:  Review of patient's allergies indicates:   Allergen Reactions    Lamisil [terbinafine] Hives       Health Maintenance:    There is no immunization history on file for this patient.   Health Maintenance   Topic Date Due    Hepatitis C Screening  1955    Lipid Panel  1955    TETANUS VACCINE  08/25/1972    Colonoscopy  08/25/2004    Pneumococcal Vaccine (65+ Low/Medium Risk) (1 of 2 - PCV13) 08/25/2019      Requesting HM from [facility].      Physical Exam      Vital Signs  Temp: 97.7 °F (36.5 °C)  Temp src: Oral  Pulse: 61  SpO2: 95 %  BP: 112/80  BP Location: Left arm  Patient Position: Sitting  Height and Weight  Height: 5' 9" (175.3 cm)  Weight: 88.4 kg (194 lb 14.2 oz)  BSA (Calculated - sq m): 2.07 sq meters  BMI (Calculated): 28.8  Weight in (lb) to have BMI = 25: 168.9]    Physical Exam   Constitutional: He appears well-developed and well-nourished.   HENT:   Head: Normocephalic and atraumatic.   Right Ear: External ear normal.   Left Ear: External ear normal.   Mouth/Throat: Oropharynx is clear and moist.   Eyes: Pupils are equal, round, and reactive to light. Conjunctivae and EOM are normal.   Cardiovascular: Normal rate, regular rhythm, normal heart sounds and intact distal pulses.   No murmur heard.  Pulmonary/Chest: Effort normal and breath sounds normal. He has no wheezes. He has no rales.   Abdominal: Soft. Bowel sounds are normal. He exhibits no distension and no abdominal bruit. There is no hepatosplenomegaly. There is no tenderness.   Vitals reviewed.       Laboratory:  CBC:      CMP:        Invalid input(s): CREATININ  URINALYSIS:  Recent Labs   Lab " 05/21/19  1403   Color, UA yel   Spec Grav UA 1.020   pH, UA 5   Nitrite, UA n   Urobilinogen, UA n      LIPIDS:      TSH:      A1C:        Assessment/Plan     Giovanni Moncada is a 64 y.o.male with:    1. Essential hypertension  - doxazosin (CARDURA) 2 MG tablet; Take 0.5 tablets (1 mg total) by mouth nightly.  Dispense: 90 tablet; Refill: 3  - olmesartan (BENICAR) 40 MG tablet; Take 1 tablet (40 mg total) by mouth every evening.  Dispense: 90 tablet; Refill: 3  - amLODIPine (NORVASC) 5 MG tablet; Take 1 tablet (5 mg total) by mouth every morning.  Dispense: 90 tablet; Refill: 3  - CBC auto differential; Future  - Comprehensive metabolic panel; Future  - Lipid panel; Future  - TSH; Future  - T4, free; Future  Continue current meds.    2. Hyperlipidemia, mixed  - omega-3 acid ethyl esters (LOVAZA) 1 gram capsule; Take 2 capsules (2 g total) by mouth 2 (two) times daily.  Dispense: 180 capsule; Refill: 3  - atorvastatin (LIPITOR) 10 MG tablet; Take 1 tablet (10 mg total) by mouth nightly.  Dispense: 90 tablet; Refill: 3  - CBC auto differential; Future  - Comprehensive metabolic panel; Future  - Lipid panel; Future  - TSH; Future  - T4, free; Future  Continue current meds.    3. Benign prostatic hyperplasia without lower urinary tract symptoms  Continue current meds.    4. GERD without esophagitis  - omeprazole (PRILOSEC) 20 MG capsule; Take 1 capsule (20 mg total) by mouth once daily.  Dispense: 90 capsule; Refill: 3  Continue current meds.    5. Erectile dysfunction, unspecified erectile dysfunction type  - vardenafil (LEVITRA) 20 MG tablet; Take 1 tablet (20 mg total) by mouth daily as needed for Erectile Dysfunction.  Dispense: 10 tablet; Refill: 5  Changing viagra to levitra per pt request/  6. History of SCC (squamous cell carcinoma) of skin    7. Cardiac arrhythmia, unspecified cardiac arrhythmia type  - acebutolol (SECTRAL) 200 MG capsule; Take 1 capsule (200 mg total) by mouth 2 (two) times daily.   Dispense: 180 capsule; Refill: 3  Continue current meds.    8. Screening for HIV (human immunodeficiency virus)  - HIV 1/2 Ag/Ab (4th Gen); Future    9. Annual physical exam  - CBC auto differential; Future  - Comprehensive metabolic panel; Future  - Lipid panel; Future  - TSH; Future  - T4, free; Future  - HIV 1/2 Ag/Ab (4th Gen); Future       Chronic conditions status updated as per HPI.  Other than changes above, cont current medications and maintain follow up with specialists.  Return to clinic in 6 months.    Arnel Flaherty MD  [facility]

## 2019-09-24 NOTE — PROGRESS NOTES
Labs look good. Cholesterol looks good.  Thyroid normal. Kidney and liver function normal.  HIV negative.

## 2019-11-11 LAB
CHOL/HDLC RATIO: 3
CHOLEST SERPL-MSCNC: 156 MG/DL (ref 0–200)
HDLC SERPL-MCNC: 50 MG/DL
LDLC SERPL CALC-MCNC: 73 MG/DL
TRIGLYCERIDE (LIPID PAN): 207

## 2020-01-30 ENCOUNTER — TELEPHONE (OUTPATIENT)
Dept: FAMILY MEDICINE | Facility: CLINIC | Age: 65
End: 2020-01-30

## 2020-01-30 NOTE — TELEPHONE ENCOUNTER
Pt is having cataract surgery on 02/05 and needs to be cleared. Last visit was 09/23/19. Can he be cleared without an appt? I couldn't find any opening

## 2020-01-30 NOTE — TELEPHONE ENCOUNTER
----- Message from Haleigh William sent at 1/30/2020 11:18 AM CST -----  Contact: Pt Daughter (Kirsten)  Patient is requesting a callback regarding medical clearance form that was fax over last week . Please give the patient a callback at [phone].

## 2020-03-02 RX ORDER — KETOROLAC TROMETHAMINE 5 MG/ML
SOLUTION OPHTHALMIC
COMMUNITY
Start: 2020-01-28 | End: 2022-03-14

## 2020-03-02 RX ORDER — TRIAZOLAM 0.25 MG/1
TABLET ORAL
COMMUNITY
Start: 2019-12-03 | End: 2022-03-14

## 2020-03-02 RX ORDER — OFLOXACIN 3 MG/ML
SOLUTION/ DROPS OPHTHALMIC
COMMUNITY
Start: 2020-01-28 | End: 2020-03-06

## 2020-03-02 RX ORDER — DUREZOL 0.5 MG/ML
EMULSION OPHTHALMIC
COMMUNITY
Start: 2020-01-28 | End: 2022-03-14

## 2020-03-06 ENCOUNTER — TELEPHONE (OUTPATIENT)
Dept: ADMINISTRATIVE | Facility: HOSPITAL | Age: 65
End: 2020-03-06

## 2020-03-06 ENCOUNTER — PATIENT OUTREACH (OUTPATIENT)
Dept: ADMINISTRATIVE | Facility: HOSPITAL | Age: 65
End: 2020-03-06

## 2020-03-06 ENCOUNTER — OFFICE VISIT (OUTPATIENT)
Dept: PRIMARY CARE CLINIC | Facility: CLINIC | Age: 65
End: 2020-03-06
Payer: COMMERCIAL

## 2020-03-06 VITALS
HEIGHT: 69 IN | WEIGHT: 198.88 LBS | OXYGEN SATURATION: 96 % | BODY MASS INDEX: 29.46 KG/M2 | TEMPERATURE: 98 F | DIASTOLIC BLOOD PRESSURE: 70 MMHG | HEART RATE: 54 BPM | SYSTOLIC BLOOD PRESSURE: 108 MMHG

## 2020-03-06 DIAGNOSIS — E78.2 HYPERLIPIDEMIA, MIXED: ICD-10-CM

## 2020-03-06 DIAGNOSIS — N40.0 BENIGN PROSTATIC HYPERPLASIA WITHOUT LOWER URINARY TRACT SYMPTOMS: ICD-10-CM

## 2020-03-06 DIAGNOSIS — N52.9 ERECTILE DYSFUNCTION, UNSPECIFIED ERECTILE DYSFUNCTION TYPE: ICD-10-CM

## 2020-03-06 DIAGNOSIS — M54.2 CHRONIC NECK PAIN: ICD-10-CM

## 2020-03-06 DIAGNOSIS — R00.2 PALPITATIONS: ICD-10-CM

## 2020-03-06 DIAGNOSIS — K21.9 GERD WITHOUT ESOPHAGITIS: ICD-10-CM

## 2020-03-06 DIAGNOSIS — G89.29 CHRONIC NECK PAIN: ICD-10-CM

## 2020-03-06 DIAGNOSIS — I10 ESSENTIAL HYPERTENSION: Primary | ICD-10-CM

## 2020-03-06 PROCEDURE — 99214 OFFICE O/P EST MOD 30 MIN: CPT | Mod: S$GLB,,, | Performed by: INTERNAL MEDICINE

## 2020-03-06 PROCEDURE — 3078F DIAST BP <80 MM HG: CPT | Mod: CPTII,S$GLB,, | Performed by: INTERNAL MEDICINE

## 2020-03-06 PROCEDURE — 3008F BODY MASS INDEX DOCD: CPT | Mod: CPTII,S$GLB,, | Performed by: INTERNAL MEDICINE

## 2020-03-06 PROCEDURE — 3074F PR MOST RECENT SYSTOLIC BLOOD PRESSURE < 130 MM HG: ICD-10-PCS | Mod: CPTII,S$GLB,, | Performed by: INTERNAL MEDICINE

## 2020-03-06 PROCEDURE — 3008F PR BODY MASS INDEX (BMI) DOCUMENTED: ICD-10-PCS | Mod: CPTII,S$GLB,, | Performed by: INTERNAL MEDICINE

## 2020-03-06 PROCEDURE — 99214 PR OFFICE/OUTPT VISIT, EST, LEVL IV, 30-39 MIN: ICD-10-PCS | Mod: S$GLB,,, | Performed by: INTERNAL MEDICINE

## 2020-03-06 PROCEDURE — 1101F PR PT FALLS ASSESS DOC 0-1 FALLS W/OUT INJ PAST YR: ICD-10-PCS | Mod: CPTII,S$GLB,, | Performed by: INTERNAL MEDICINE

## 2020-03-06 PROCEDURE — 1101F PT FALLS ASSESS-DOCD LE1/YR: CPT | Mod: CPTII,S$GLB,, | Performed by: INTERNAL MEDICINE

## 2020-03-06 PROCEDURE — 99999 PR PBB SHADOW E&M-EST. PATIENT-LVL III: ICD-10-PCS | Mod: PBBFAC,,, | Performed by: INTERNAL MEDICINE

## 2020-03-06 PROCEDURE — 99999 PR PBB SHADOW E&M-EST. PATIENT-LVL III: CPT | Mod: PBBFAC,,, | Performed by: INTERNAL MEDICINE

## 2020-03-06 PROCEDURE — 3078F PR MOST RECENT DIASTOLIC BLOOD PRESSURE < 80 MM HG: ICD-10-PCS | Mod: CPTII,S$GLB,, | Performed by: INTERNAL MEDICINE

## 2020-03-06 PROCEDURE — 3074F SYST BP LT 130 MM HG: CPT | Mod: CPTII,S$GLB,, | Performed by: INTERNAL MEDICINE
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
## 2020-04-23 ENCOUNTER — TELEPHONE (OUTPATIENT)
Dept: UROLOGY | Facility: CLINIC | Age: 65
End: 2020-04-23

## 2020-04-23 NOTE — TELEPHONE ENCOUNTER
Appt scheduled with Kirsten.  ----- Message from Tammy Silva sent at 4/23/2020  1:41 PM CDT -----  Contact: Kirsten(Daughter)  Name of Who is Calling:Kirsten(Daughter)      What is the request in detail: Kirsten(Daughter) is calling to speak to staff in regards to scheduling an annual josé luis.... Please call to further assist .       Can the clinic reply by [facility]:  N       What Number to Call Back if not in [facility]: [phone]

## 2020-05-21 ENCOUNTER — TELEPHONE (OUTPATIENT)
Dept: FAMILY MEDICINE | Facility: CLINIC | Age: 65
End: 2020-05-21

## 2020-05-21 DIAGNOSIS — K46.9 ABDOMINAL HERNIA WITHOUT OBSTRUCTION AND WITHOUT GANGRENE, RECURRENCE NOT SPECIFIED, UNSPECIFIED HERNIA TYPE: Primary | ICD-10-CM

## 2020-05-21 NOTE — TELEPHONE ENCOUNTER
----- Message from Van Peoples sent at 5/21/2020 11:48 AM CDT -----  Contact: Daughter Kirsten [phone] or [phone]  Patient would like to get medical advice.  Symptoms (please be specific):  Hernia Issues  How long has patient had these symptoms:    Pharmacy name and phone #:  [facility] - [place], [place] - [address] [phone] (Phone) [phone] (Fax)     Comments: Daughter of patient calling stating patient is having Hernia issues, wants to know if can be referred to a specialist, call to inform. Fahad Curtis [phone] or [phone]    Please call an advise  Thank you

## 2020-05-21 NOTE — TELEPHONE ENCOUNTER
Spoke to Kirsten, her and pt are looking to get a referral to a general surgeon for a hernia removal. Pt saw you on 3/6/2020

## 2020-05-21 NOTE — TELEPHONE ENCOUNTER
Patient states when he came in for his last visit his belly button was sticking out and Dr. Flaherty stated it was a hernia. Patient states now he is having issues with his left groin throbbing.

## 2020-05-24 ENCOUNTER — PATIENT OUTREACH (OUTPATIENT)
Dept: ADMINISTRATIVE | Facility: OTHER | Age: 65
End: 2020-05-24

## 2020-05-25 ENCOUNTER — OFFICE VISIT (OUTPATIENT)
Dept: SURGERY | Facility: CLINIC | Age: 65
End: 2020-05-25
Payer: COMMERCIAL

## 2020-05-25 VITALS
HEIGHT: 69 IN | BODY MASS INDEX: 29.39 KG/M2 | TEMPERATURE: 98 F | SYSTOLIC BLOOD PRESSURE: 115 MMHG | DIASTOLIC BLOOD PRESSURE: 68 MMHG | HEART RATE: 54 BPM | WEIGHT: 198.44 LBS

## 2020-05-25 DIAGNOSIS — K46.9 ABDOMINAL HERNIA WITHOUT OBSTRUCTION AND WITHOUT GANGRENE, RECURRENCE NOT SPECIFIED, UNSPECIFIED HERNIA TYPE: ICD-10-CM

## 2020-05-25 PROCEDURE — 1101F PR PT FALLS ASSESS DOC 0-1 FALLS W/OUT INJ PAST YR: ICD-10-PCS | Mod: CPTII,S$GLB,, | Performed by: SURGERY

## 2020-05-25 PROCEDURE — 99999 PR PBB SHADOW E&M-EST. PATIENT-LVL V: ICD-10-PCS | Mod: PBBFAC,,, | Performed by: SURGERY

## 2020-05-25 PROCEDURE — 99999 PR PBB SHADOW E&M-EST. PATIENT-LVL V: CPT | Mod: PBBFAC,,, | Performed by: SURGERY

## 2020-05-25 PROCEDURE — 3074F SYST BP LT 130 MM HG: CPT | Mod: CPTII,S$GLB,, | Performed by: SURGERY

## 2020-05-25 PROCEDURE — 3074F PR MOST RECENT SYSTOLIC BLOOD PRESSURE < 130 MM HG: ICD-10-PCS | Mod: CPTII,S$GLB,, | Performed by: SURGERY

## 2020-05-25 PROCEDURE — 3008F PR BODY MASS INDEX (BMI) DOCUMENTED: ICD-10-PCS | Mod: CPTII,S$GLB,, | Performed by: SURGERY

## 2020-05-25 PROCEDURE — 3078F DIAST BP <80 MM HG: CPT | Mod: CPTII,S$GLB,, | Performed by: SURGERY

## 2020-05-25 PROCEDURE — 1101F PT FALLS ASSESS-DOCD LE1/YR: CPT | Mod: CPTII,S$GLB,, | Performed by: SURGERY

## 2020-05-25 PROCEDURE — 3078F PR MOST RECENT DIASTOLIC BLOOD PRESSURE < 80 MM HG: ICD-10-PCS | Mod: CPTII,S$GLB,, | Performed by: SURGERY

## 2020-05-25 PROCEDURE — 99203 OFFICE O/P NEW LOW 30 MIN: CPT | Mod: S$GLB,,, | Performed by: SURGERY

## 2020-05-25 PROCEDURE — 3008F BODY MASS INDEX DOCD: CPT | Mod: CPTII,S$GLB,, | Performed by: SURGERY

## 2020-05-25 PROCEDURE — 99203 PR OFFICE/OUTPT VISIT, NEW, LEVL III, 30-44 MIN: ICD-10-PCS | Mod: S$GLB,,, | Performed by: SURGERY

## 2020-05-25 NOTE — LETTER
May 25, 2020      Arnel Flaherty MD  [address] [facility]  [address]  [place] [address]           [facility]  [address]  [place] [address]  Phone: [phone]          Patient: Giovanni Moncada   MR Number: [id number]   YOB: 1955   Date of Visit: 5/25/2020       Dear Dr. Arnel Flaherty:    Thank you for referring Giovanni Moncada to me for evaluation. Attached you will find relevant portions of my assessment and plan of care.    If you have questions, please do not hesitate to call me. I look forward to following Giovanni Moncada along with you.    Sincerely,    Sancho Montana MD    Enclosure  CC:  No Recipients    If you would like to receive this communication electronically, please contact [email or web address] or [phone] to request more information on [employer] access.    For providers and/or their staff who would like to refer a patient to [facility], please contact us through our one-stop-shop provider referral line, [facility] , at [phone].    If you feel you have received this communication in error or would no longer like to receive these types of communications, please e-mail [email or web address]

## 2020-05-25 NOTE — PROGRESS NOTES
Patient ID: Giovanni Moncada is a 64 y.o. male.    Chief Complaint: Consult      HPI:  Mr. Moncada is a 64-year-old man who presents with an umbilical hernia and left groin pain for 3 months.  The umbilical hernia is asymptomatic except for appearance, which he is not particularly bothered by.  The pain in his left groin is random in onset.  It feels like somebody is pounding and nail into his groin.  It spontaneously resolves.  It is not associated with any activity.    He is very active.  He walks 18,000 steps per day.  Plays [employer].    He has hypertension and hyperlipidemia.    He has had neck surgery for malignancy of unknown primary.  No abdominal surgeries.          Review of Systems   Constitutional: Negative.  Negative for fever.   HENT: Negative.    Eyes: Negative.    Respiratory: Negative.    Cardiovascular: Negative.    Gastrointestinal: Negative.    Endocrine: Negative.    Genitourinary: Negative.    Musculoskeletal: Negative.    Skin: Negative.    Allergic/Immunologic: Negative.    Neurological: Negative.    Hematological: Negative.    Psychiatric/Behavioral: Negative.        Current Outpatient Medications   Medication Sig Dispense Refill    acebutolol (SECTRAL) 200 MG capsule Take 1 capsule (200 mg total) by mouth 2 (two) times daily. 180 capsule 3    amLODIPine (NORVASC) 5 MG tablet Take 1 tablet (5 mg total) by mouth every morning. 90 tablet 3    ascorbic acid, vitamin C, (VITAMIN C WITH DADA HIPS) 1000 MG tablet Take 1,000 mg by mouth.      aspirin (ECOTRIN) 81 MG EC tablet Take 81 mg by mouth 2 (two) times daily.      atorvastatin (LIPITOR) 10 MG tablet Take 1 tablet (10 mg total) by mouth nightly. 90 tablet 3    bacillus coagulans-inulin (PROBIOTIC FORMULA, INULIN,) 1 billion-250 cell-mg Cap Take by mouth.      cholecalciferol, vitamin D3, 5,000 unit Tab Take by mouth.      coenzyme Q10 (CO Q-10) 100 mg capsule Take 100 mg by mouth once daily.      cyanocobalamin (VITAMIN B-12) 1000  MCG tablet Take 100 mcg by mouth once daily.      doxazosin (CARDURA) 2 MG tablet Take 0.5 tablets (1 mg total) by mouth nightly. 90 tablet 3    glucosamine-chondroit-vit C-Mn (GLUCOSAMINE 1500 COMPLEX) 500-400 mg Cap Take by mouth.      GLUCOSAMINE/CHONDR MARI A SOD (OSTEO BI-FLEX ORAL) Take by mouth.      olmesartan (BENICAR) 40 MG tablet Take 1 tablet (40 mg total) by mouth every evening. 90 tablet 3    omega-3 acid ethyl esters (LOVAZA) 1 gram capsule Take 2 capsules (2 g total) by mouth 2 (two) times daily. 180 capsule 3    omeprazole (PRILOSEC) 20 MG capsule Take 1 capsule (20 mg total) by mouth once daily. 90 capsule 3    triazolam (HALCION) 0.25 MG Tab       vardenafil (LEVITRA) 20 MG tablet Take 1 tablet (20 mg total) by mouth daily as needed for Erectile Dysfunction. 10 tablet 5    DUREZOL 0.05 % Drop ophthalmic solution One (1) drop four (4) times a day in right eye only. Start 3 (three) days prior to surgery.      ketorolac 0.5% (ACULAR) 0.5 % Drop One (1) drop four (4) times a day in right eye only. Start 3 (three) days prior to surgery.       No current facility-administered medications for this visit.        Review of patient's allergies indicates:   Allergen Reactions    Lamisil [terbinafine] Hives       Past Medical History:   Diagnosis Date    Hypercholesteremia     Hypertension        Past Surgical History:   Procedure Laterality Date     colonoscopy  01/07/2016    one 4 mm poly in the cecum. one 3 mm poly in the ascending colon. Repeat colonosocpy in 5 years    CATARACT EXTRACTION, BILATERAL  02/2020    NECK SURGERY         Family History   Problem Relation Age of Onset    Heart disease Father     Cancer Mother        Social History     Socioeconomic History    Marital status:      Spouse name: Not on file    Number of children: Not on file    Years of education: Not on file    Highest education level: Not on file   Occupational History    Not on file   Social Needs     Financial resource strain: Not on file    Food insecurity:     Worry: Not on file     Inability: Not on file    Transportation needs:     Medical: Not on file     Non-medical: Not on file   Tobacco Use    Smoking status: Never Smoker    Smokeless tobacco: Never Used   Substance and Sexual Activity    Alcohol use: Yes    Drug use: No    Sexual activity: Yes   Lifestyle    Physical activity:     Days per week: Not on file     Minutes per session: Not on file    Stress: Not on file   Relationships    Social connections:     Talks on phone: Not on file     Gets together: Not on file     Attends Gnosticism service: Not on file     Active member of club or organization: Not on file     Attends meetings of clubs or organizations: Not on file     Relationship status: Not on file   Other Topics Concern    Not on file   Social History Narrative    Not on file       Vitals:    05/25/20 1128   BP: 115/68   Pulse: (!) 54   Temp: 98.3 °F (36.8 °C)       Physical Exam   Constitutional: He is oriented to person, place, and time. He appears well-developed and well-nourished.   HENT:   Surgical scar of right neck   Cardiovascular: Normal rate and regular rhythm.   Pulmonary/Chest: Effort normal. No respiratory distress.   Abdominal: Soft. He exhibits no distension.   1 cm umbilical hernia without incarceration   Genitourinary: Penis normal.   Genitourinary Comments: No palpable inguinal hernia   Musculoskeletal: He exhibits no edema.   Neurological: He is alert and oriented to person, place, and time.   Skin: Skin is warm and dry.   Nursing note and vitals reviewed.      Assessment & Plan:    64-year-old man with umbilical hernia and left groin pain of unknown etiology.  No palpable inguinal hernia on physical exam.  Symptoms are not consistent with any particular pathology.    We will obtain CT abdomen and pelvis with IV contrast to evaluate the pelvis and groin for possible nerve compression, small inguinal hernia or  any other pathology.    We will call him with results.    ADRI Bosch MD   General Surgery, PGY-2  Pager: [phone]

## 2020-05-26 DIAGNOSIS — K46.9 NON-RECURRENT ABDOMINAL HERNIA WITHOUT OBSTRUCTION OR GANGRENE, UNSPECIFIED HERNIA TYPE: Primary | ICD-10-CM

## 2020-05-29 ENCOUNTER — HOSPITAL ENCOUNTER (OUTPATIENT)
Dept: RADIOLOGY | Facility: OTHER | Age: 65
Discharge: HOME | End: 2020-05-29
Attending: SURGERY
Payer: COMMERCIAL

## 2020-05-29 DIAGNOSIS — K46.9 NON-RECURRENT ABDOMINAL HERNIA WITHOUT OBSTRUCTION OR GANGRENE, UNSPECIFIED HERNIA TYPE: Primary | ICD-10-CM

## 2020-05-29 DIAGNOSIS — K46.9 NON-RECURRENT ABDOMINAL HERNIA WITHOUT OBSTRUCTION OR GANGRENE, UNSPECIFIED HERNIA TYPE: ICD-10-CM

## 2020-05-29 PROCEDURE — 74177 CT ABD & PELVIS W/CONTRAST: CPT | Mod: 26,,, | Performed by: RADIOLOGY

## 2020-05-29 PROCEDURE — 74177 CT ABD & PELVIS W/CONTRAST: CPT | Mod: TC

## 2020-05-29 PROCEDURE — 74177 CT ABDOMEN PELVIS WITH CONTRAST: ICD-10-PCS | Mod: 26,,, | Performed by: RADIOLOGY

## 2020-05-29 PROCEDURE — 25500020 PHARM REV CODE 255: Performed by: SURGERY

## 2020-05-29 RX ADMIN — IOHEXOL 100 ML: 350 INJECTION, SOLUTION INTRAVENOUS at 02:05

## 2020-05-29 RX ADMIN — IOHEXOL 1000 ML: 12 SOLUTION ORAL at 02:05

## 2020-06-02 ENCOUNTER — TELEPHONE (OUTPATIENT)
Dept: SURGERY | Facility: CLINIC | Age: 65
End: 2020-06-02

## 2020-06-02 NOTE — TELEPHONE ENCOUNTER
Called pt to inform results of CT per Dr. Montana. Pt wishes to discuss results with wife and will call back when ready to schedule.

## 2020-06-30 ENCOUNTER — TELEPHONE (OUTPATIENT)
Dept: UROLOGY | Facility: CLINIC | Age: 65
End: 2020-06-30

## 2020-07-06 ENCOUNTER — PATIENT OUTREACH (OUTPATIENT)
Dept: ADMINISTRATIVE | Facility: OTHER | Age: 65
End: 2020-07-06

## 2020-07-06 ENCOUNTER — LAB VISIT (OUTPATIENT)
Dept: LAB | Facility: OTHER | Age: 65
End: 2020-07-06
Attending: SURGERY
Payer: COMMERCIAL

## 2020-07-06 DIAGNOSIS — K46.9 NON-RECURRENT ABDOMINAL HERNIA WITHOUT OBSTRUCTION OR GANGRENE, UNSPECIFIED HERNIA TYPE: ICD-10-CM

## 2020-07-06 DIAGNOSIS — N40.0 ENLARGED PROSTATE WITHOUT LOWER URINARY TRACT SYMPTOMS (LUTS): ICD-10-CM

## 2020-07-06 DIAGNOSIS — N52.9 ERECTILE DYSFUNCTION, UNSPECIFIED ERECTILE DYSFUNCTION TYPE: ICD-10-CM

## 2020-07-06 LAB
COMPLEXED PSA SERPL-MCNC: 0.96 NG/ML (ref 0–4)
CREAT SERPL-MCNC: 0.9 MG/DL (ref 0.5–1.4)
EST. GFR  (AFRICAN AMERICAN): >60 ML/MIN/1.73 M^2
EST. GFR  (NON AFRICAN AMERICAN): >60 ML/MIN/1.73 M^2

## 2020-07-06 PROCEDURE — 84153 ASSAY OF PSA TOTAL: CPT

## 2020-07-06 PROCEDURE — 82565 ASSAY OF CREATININE: CPT

## 2020-07-06 PROCEDURE — 36415 COLL VENOUS BLD VENIPUNCTURE: CPT

## 2020-07-07 ENCOUNTER — OFFICE VISIT (OUTPATIENT)
Dept: UROLOGY | Facility: CLINIC | Age: 65
End: 2020-07-07
Payer: COMMERCIAL

## 2020-07-07 VITALS
HEART RATE: 39 BPM | HEIGHT: 69 IN | BODY MASS INDEX: 29.33 KG/M2 | SYSTOLIC BLOOD PRESSURE: 120 MMHG | WEIGHT: 198 LBS | DIASTOLIC BLOOD PRESSURE: 63 MMHG

## 2020-07-07 DIAGNOSIS — N52.9 ERECTILE DYSFUNCTION, UNSPECIFIED ERECTILE DYSFUNCTION TYPE: ICD-10-CM

## 2020-07-07 DIAGNOSIS — N40.0 ENLARGED PROSTATE WITHOUT LOWER URINARY TRACT SYMPTOMS (LUTS): Primary | ICD-10-CM

## 2020-07-07 PROCEDURE — 3078F PR MOST RECENT DIASTOLIC BLOOD PRESSURE < 80 MM HG: ICD-10-PCS | Mod: CPTII,S$GLB,, | Performed by: UROLOGY

## 2020-07-07 PROCEDURE — 99214 OFFICE O/P EST MOD 30 MIN: CPT | Mod: S$GLB,,, | Performed by: UROLOGY

## 2020-07-07 PROCEDURE — 1101F PT FALLS ASSESS-DOCD LE1/YR: CPT | Mod: CPTII,S$GLB,, | Performed by: UROLOGY

## 2020-07-07 PROCEDURE — 3074F PR MOST RECENT SYSTOLIC BLOOD PRESSURE < 130 MM HG: ICD-10-PCS | Mod: CPTII,S$GLB,, | Performed by: UROLOGY

## 2020-07-07 PROCEDURE — 3078F DIAST BP <80 MM HG: CPT | Mod: CPTII,S$GLB,, | Performed by: UROLOGY

## 2020-07-07 PROCEDURE — 3008F PR BODY MASS INDEX (BMI) DOCUMENTED: ICD-10-PCS | Mod: CPTII,S$GLB,, | Performed by: UROLOGY

## 2020-07-07 PROCEDURE — 99214 PR OFFICE/OUTPT VISIT, EST, LEVL IV, 30-39 MIN: ICD-10-PCS | Mod: S$GLB,,, | Performed by: UROLOGY

## 2020-07-07 PROCEDURE — 3008F BODY MASS INDEX DOCD: CPT | Mod: CPTII,S$GLB,, | Performed by: UROLOGY

## 2020-07-07 PROCEDURE — 3074F SYST BP LT 130 MM HG: CPT | Mod: CPTII,S$GLB,, | Performed by: UROLOGY

## 2020-07-07 PROCEDURE — 1101F PR PT FALLS ASSESS DOC 0-1 FALLS W/OUT INJ PAST YR: ICD-10-PCS | Mod: CPTII,S$GLB,, | Performed by: UROLOGY

## 2020-07-07 RX ORDER — ERGOCALCIFEROL 1.25 MG/1
50000 CAPSULE ORAL
COMMUNITY
End: 2023-11-15 | Stop reason: ALTCHOICE

## 2020-07-07 RX ORDER — VITAMIN A ACETATE 3000 MCG
2400 TABLET, SUBLINGUAL SUBLINGUAL
COMMUNITY

## 2020-07-07 RX ORDER — SILDENAFIL 100 MG/1
100 TABLET, FILM COATED ORAL DAILY PRN
Qty: 30 TABLET | Refills: 11 | Status: SHIPPED | OUTPATIENT
Start: 2020-07-07 | End: 2021-08-05

## 2020-07-07 NOTE — PROGRESS NOTES
Dr Aquino forwarded these results to the patient via [employer].  He will discuss the results with the patient in person at the next appointment.  The patient was instructed to make an appointment if he does not already have one scheduled.

## 2020-07-07 NOTE — PROGRESS NOTES
"Subjective:      Giovanni Moncada is a 64 y.o. male who returns today regarding his       No complaints.  Requests viagra 100mg prn      The following portions of the patient's history were reviewed and updated as appropriate: allergies, current medications, past family history, past medical history, past social history, past surgical history and problem list.    Review of Systems  Pertinent items are noted in HPI.  A comprehensive multipoint review of systems was negative except as otherwise stated in the HPI.     Objective:   Vitals: /63 (BP Location: Right arm, Patient Position: Sitting, BP Method: X-Large (Automatic))   Pulse (!) 39   Ht 5' 9" (1.753 m)   Wt 89.8 kg (198 lb)   BMI 29.24 kg/m²     Physical Exam   General: alert and oriented, no acute distress  Respiratory: Symmetric expansion, non-labored breathing  Cardiovascular: normal to inspection  Abdomen: non distended   Skin: normal coloration and turgor, no rashes, no suspicious skin lesions noted  Neuro: no gross deficits  Psych: normal judgment and insight, normal mood/affect and non-anxious  ES 45g no nodules      Physical Exam    Lab Review   Urinalysis demonstrates no specimen    Lab Results   Component Value Date    WBC 6.25 09/23/2019    HGB 14.1 09/23/2019    HCT 44.3 09/23/2019    MCV 94 09/23/2019     09/23/2019     Lab Results   Component Value Date    CREATININE 0.9 07/06/2020    BUN 16 05/29/2020     Lab Results   Component Value Date    PSADIAG 0.96 07/06/2020    PSADIAG 1.5 07/16/2018    PSADIAG 0.95 09/23/2016           Imaging  -  Assessment and Plan:   Enlarged prostate without lower urinary tract symptoms (luts)    Erectile dysfunction, unspecified erectile dysfunction type    viagra 100mg prn    rtc 1 yr with psa and repeat ES          "

## 2020-09-11 ENCOUNTER — OFFICE VISIT (OUTPATIENT)
Dept: PRIMARY CARE CLINIC | Facility: CLINIC | Age: 65
End: 2020-09-11
Payer: COMMERCIAL

## 2020-09-11 VITALS
HEART RATE: 52 BPM | WEIGHT: 188.06 LBS | HEIGHT: 69 IN | DIASTOLIC BLOOD PRESSURE: 82 MMHG | SYSTOLIC BLOOD PRESSURE: 100 MMHG | BODY MASS INDEX: 27.85 KG/M2

## 2020-09-11 DIAGNOSIS — E78.2 HYPERLIPIDEMIA, MIXED: ICD-10-CM

## 2020-09-11 DIAGNOSIS — K21.9 GERD WITHOUT ESOPHAGITIS: ICD-10-CM

## 2020-09-11 DIAGNOSIS — I49.9 CARDIAC ARRHYTHMIA, UNSPECIFIED CARDIAC ARRHYTHMIA TYPE: ICD-10-CM

## 2020-09-11 DIAGNOSIS — G89.29 CHRONIC NECK PAIN: ICD-10-CM

## 2020-09-11 DIAGNOSIS — N52.9 ERECTILE DYSFUNCTION, UNSPECIFIED ERECTILE DYSFUNCTION TYPE: ICD-10-CM

## 2020-09-11 DIAGNOSIS — I10 ESSENTIAL HYPERTENSION: Primary | ICD-10-CM

## 2020-09-11 DIAGNOSIS — M54.2 CHRONIC NECK PAIN: ICD-10-CM

## 2020-09-11 DIAGNOSIS — R00.2 PALPITATIONS: ICD-10-CM

## 2020-09-11 DIAGNOSIS — N40.0 BENIGN PROSTATIC HYPERPLASIA WITHOUT LOWER URINARY TRACT SYMPTOMS: ICD-10-CM

## 2020-09-11 PROCEDURE — 1101F PT FALLS ASSESS-DOCD LE1/YR: CPT | Mod: CPTII,S$GLB,, | Performed by: INTERNAL MEDICINE

## 2020-09-11 PROCEDURE — 99999 PR PBB SHADOW E&M-EST. PATIENT-LVL III: CPT | Mod: PBBFAC,,, | Performed by: INTERNAL MEDICINE

## 2020-09-11 PROCEDURE — 99214 PR OFFICE/OUTPT VISIT, EST, LEVL IV, 30-39 MIN: ICD-10-PCS | Mod: S$GLB,,, | Performed by: INTERNAL MEDICINE

## 2020-09-11 PROCEDURE — 3008F PR BODY MASS INDEX (BMI) DOCUMENTED: ICD-10-PCS | Mod: CPTII,S$GLB,, | Performed by: INTERNAL MEDICINE

## 2020-09-11 PROCEDURE — 1101F PR PT FALLS ASSESS DOC 0-1 FALLS W/OUT INJ PAST YR: ICD-10-PCS | Mod: CPTII,S$GLB,, | Performed by: INTERNAL MEDICINE

## 2020-09-11 PROCEDURE — 3074F SYST BP LT 130 MM HG: CPT | Mod: CPTII,S$GLB,, | Performed by: INTERNAL MEDICINE

## 2020-09-11 PROCEDURE — 99214 OFFICE O/P EST MOD 30 MIN: CPT | Mod: S$GLB,,, | Performed by: INTERNAL MEDICINE

## 2020-09-11 PROCEDURE — 1159F MED LIST DOCD IN RCRD: CPT | Mod: S$GLB,,, | Performed by: INTERNAL MEDICINE

## 2020-09-11 PROCEDURE — 3008F BODY MASS INDEX DOCD: CPT | Mod: CPTII,S$GLB,, | Performed by: INTERNAL MEDICINE

## 2020-09-11 PROCEDURE — 3079F PR MOST RECENT DIASTOLIC BLOOD PRESSURE 80-89 MM HG: ICD-10-PCS | Mod: CPTII,S$GLB,, | Performed by: INTERNAL MEDICINE

## 2020-09-11 PROCEDURE — 3079F DIAST BP 80-89 MM HG: CPT | Mod: CPTII,S$GLB,, | Performed by: INTERNAL MEDICINE

## 2020-09-11 PROCEDURE — 1159F PR MEDICATION LIST DOCUMENTED IN MEDICAL RECORD: ICD-10-PCS | Mod: S$GLB,,, | Performed by: INTERNAL MEDICINE

## 2020-09-11 PROCEDURE — 99999 PR PBB SHADOW E&M-EST. PATIENT-LVL III: ICD-10-PCS | Mod: PBBFAC,,, | Performed by: INTERNAL MEDICINE

## 2020-09-11 PROCEDURE — 3074F PR MOST RECENT SYSTOLIC BLOOD PRESSURE < 130 MM HG: ICD-10-PCS | Mod: CPTII,S$GLB,, | Performed by: INTERNAL MEDICINE

## 2020-09-11 RX ORDER — AMLODIPINE BESYLATE 5 MG/1
5 TABLET ORAL EVERY MORNING
Qty: 90 TABLET | Refills: 3 | Status: SHIPPED | OUTPATIENT
Start: 2020-09-11 | End: 2021-12-06

## 2020-09-11 RX ORDER — ACEBUTOLOL HYDROCHLORIDE 200 MG/1
200 CAPSULE ORAL 2 TIMES DAILY
Qty: 180 CAPSULE | Refills: 3 | Status: SHIPPED | OUTPATIENT
Start: 2020-09-11 | End: 2021-09-07

## 2020-09-11 RX ORDER — OLMESARTAN MEDOXOMIL 40 MG/1
40 TABLET ORAL NIGHTLY
Qty: 90 TABLET | Refills: 3 | Status: SHIPPED | OUTPATIENT
Start: 2020-09-11 | End: 2021-03-12 | Stop reason: SDUPTHER

## 2020-09-11 RX ORDER — OMEPRAZOLE 20 MG/1
20 CAPSULE, DELAYED RELEASE ORAL DAILY
Qty: 90 CAPSULE | Refills: 3 | Status: SHIPPED | OUTPATIENT
Start: 2020-09-11 | End: 2021-05-14

## 2020-09-11 RX ORDER — DOXAZOSIN 2 MG/1
1 TABLET ORAL NIGHTLY
Qty: 90 TABLET | Refills: 3 | Status: SHIPPED | OUTPATIENT
Start: 2020-09-11 | End: 2021-03-12 | Stop reason: SDUPTHER

## 2020-09-11 RX ORDER — ATORVASTATIN CALCIUM 10 MG/1
10 TABLET, FILM COATED ORAL NIGHTLY
Qty: 90 TABLET | Refills: 3 | Status: SHIPPED | OUTPATIENT
Start: 2020-09-11 | End: 2021-05-14

## 2020-09-11 NOTE — PROGRESS NOTES
[facility] Note    Chief Complaint      Chief Complaint   Patient presents with    Hypertension    Ear Fullness     water in ears       History of Present Illness      Giovanni Moncada is a 65 y.o. male with chronic conditions of HTN, HLD, BPH, GERD, ED, hx of squamous cell carcinoma of neck who presents today for: follow up chronic conditions.  Since last visit, had bilateral cataract surgery with Dr. Guerrero.    HTN: BP at goal on benicar, amlodipine, acebutolol, doxazosin.  Interested in cutting back a med 2/2 low normal Bps.    HLD: controlled on atorvastatin.  LDL 74.  Sees Dr. Rogers.  ED: Sees Dr. Aquino, urology.  On levitra.   GERD: Controlled on omeprazole.  No new or worsening symptoms.     Cardiac arrhythmia: Sees Dr. Rogers.  Previous diagnosed during stress test.  On acebutolol for rate control.  BPH: Sees Dr. Aquino.  Controlled on doxazosin.  No new or worsening symptoms  Hx of squamous cell carcinoma neck s/p resection: no new changes.  Flu shot declines.  Td 2005.  Pneumonia vaccines declines.  Shingles vaccine declines.    Cscope 2016, Dr. Garcia, polyps, 5 yr interval.    Past Medical History:  Past Medical History:   Diagnosis Date    Hypercholesteremia     Hypertension        Past Surgical History:   has a past surgical history that includes Neck surgery; Cataract extraction, bilateral (02/2020); and  colonoscopy (01/07/2016).    Family History:  family history includes Cancer in his mother; Heart disease in his father.     Social History:  Social History     Tobacco Use    Smoking status: Never Smoker    Smokeless tobacco: Never Used   Substance Use Topics    Alcohol use: Yes    Drug use: No       I personally reviewed all past medical, surgical, social and family history.    Review of Systems   Constitutional: Negative for chills, fever and malaise/fatigue.   Respiratory: Negative for shortness of breath.    Cardiovascular: Negative for chest pain.    Gastrointestinal: Negative for constipation, diarrhea, nausea and vomiting.   Skin: Negative for rash.   Neurological: Negative for weakness.   All other systems reviewed and are negative.       Medications:  Outpatient Encounter Medications as of 9/11/2020   Medication Sig Dispense Refill    a-cysteine/arg zinc/glut/mv-mn (L GLUTAMINE-N ACET-ARG-VIT-MIN ORAL) Take by mouth.      acebutoloL (SECTRAL) 200 MG capsule Take 1 capsule (200 mg total) by mouth 2 (two) times daily. 180 capsule 3    amLODIPine (NORVASC) 5 MG tablet Take 1 tablet (5 mg total) by mouth every morning. 90 tablet 3    ascorbic acid, vitamin C, (VITAMIN C WITH DADA HIPS) 1000 MG tablet Take 1,000 mg by mouth.      aspirin (ECOTRIN) 81 MG EC tablet Take 81 mg by mouth 2 (two) times daily.      atorvastatin (LIPITOR) 10 MG tablet Take 1 tablet (10 mg total) by mouth nightly. 90 tablet 3    bacillus coagulans-inulin (PROBIOTIC FORMULA, INULIN,) 1 billion-250 cell-mg Cap Take by mouth.      cholecalciferol, vitamin D3, 5,000 unit Tab Take by mouth.      coenzyme Q10 (CO Q-10) 100 mg capsule Take 100 mg by mouth once daily.      cyanocobalamin (VITAMIN B-12) 1000 MCG tablet Take 100 mcg by mouth once daily.      doxazosin (CARDURA) 2 MG tablet Take 0.5 tablets (1 mg total) by mouth nightly. 90 tablet 3    DUREZOL 0.05 % Drop ophthalmic solution One (1) drop four (4) times a day in right eye only. Start 3 (three) days prior to surgery.      ergocalciferol (ERGOCALCIFEROL) 50,000 unit Cap Take 50,000 Units by mouth every 7 days.      glucosamine-chondroit-vit C-Mn (GLUCOSAMINE 1500 COMPLEX) 500-400 mg Cap Take by mouth.      GLUCOSAMINE/CHONDR MARI A SOD (OSTEO BI-FLEX ORAL) Take by mouth.      ketorolac 0.5% (ACULAR) 0.5 % Drop One (1) drop four (4) times a day in right eye only. Start 3 (three) days prior to surgery.      olmesartan (BENICAR) 40 MG tablet Take 1 tablet (40 mg total) by mouth every evening. 90 tablet 3    omega-3 acid  "ethyl esters (LOVAZA) 1 gram capsule Take 2 capsules (2 g total) by mouth 2 (two) times daily. 180 capsule 3    omeprazole (PRILOSEC) 20 MG capsule Take 1 capsule (20 mg total) by mouth once daily. 90 capsule 3    sildenafiL (VIAGRA) 100 MG tablet Take 1 tablet (100 mg total) by mouth daily as needed for Erectile Dysfunction. 30 tablet 11    triazolam (HALCION) 0.25 MG Tab       vitamin A acetate 10,000 unit Subl Place 2,400 mcg under the tongue.      [DISCONTINUED] acebutolol (SECTRAL) 200 MG capsule Take 1 capsule (200 mg total) by mouth 2 (two) times daily. 180 capsule 3    [DISCONTINUED] amLODIPine (NORVASC) 5 MG tablet Take 1 tablet (5 mg total) by mouth every morning. 90 tablet 3    [DISCONTINUED] atorvastatin (LIPITOR) 10 MG tablet Take 1 tablet (10 mg total) by mouth nightly. 90 tablet 3    [DISCONTINUED] doxazosin (CARDURA) 2 MG tablet Take 0.5 tablets (1 mg total) by mouth nightly. 90 tablet 3    [DISCONTINUED] olmesartan (BENICAR) 40 MG tablet Take 1 tablet (40 mg total) by mouth every evening. 90 tablet 3    [DISCONTINUED] omeprazole (PRILOSEC) 20 MG capsule Take 1 capsule (20 mg total) by mouth once daily. 90 capsule 3     No facility-administered encounter medications on file as of 9/11/2020.        Allergies:  Review of patient's allergies indicates:   Allergen Reactions    Lamisil [terbinafine] Hives       Health Maintenance:    There is no immunization history on file for this patient.   Health Maintenance   Topic Date Due    Hepatitis C Screening  1955    TETANUS VACCINE  08/25/1972    Pneumococcal Vaccine (65+ Low/Medium Risk) (1 of 2 - PCV13) 08/25/2019    Lipid Panel  11/11/2024        Physical Exam      Vital Signs  Pulse: (!) 52  BP: 100/82  BP Location: Left arm  Height and Weight  Height: 5' 9" (175.3 cm)  Weight: 85.3 kg (188 lb 0.8 oz)  BSA (Calculated - sq m): 2.04 sq meters  BMI (Calculated): 27.8  Weight in (lb) to have BMI = 25: 168.9]    Physical Exam  Vitals " signs reviewed.   Constitutional:       Appearance: He is well-developed.   HENT:      Head: Normocephalic and atraumatic.      Right Ear: External ear normal.      Left Ear: External ear normal.   Eyes:      Conjunctiva/sclera: Conjunctivae normal.      Pupils: Pupils are equal, round, and reactive to light.   Cardiovascular:      Rate and Rhythm: Normal rate and regular rhythm.      Heart sounds: Normal heart sounds. No murmur.   Pulmonary:      Effort: Pulmonary effort is normal.      Breath sounds: Normal breath sounds. No wheezing or rales.   Abdominal:      General: Bowel sounds are normal. There is no distension or abdominal bruit.      Palpations: Abdomen is soft.      Tenderness: There is no abdominal tenderness.          Laboratory:  CBC:  Recent Labs   Lab 09/23/19  0835   WBC 6.25   RBC 4.74   Hemoglobin 14.1   Hematocrit 44.3   Platelets 167   Mean Corpuscular Volume 94   Mean Corpuscular Hemoglobin 29.7   Mean Corpuscular Hemoglobin Conc 31.8 L     CMP:  Recent Labs   Lab 09/23/19  0835 05/29/20  1304   Glucose 110 95   Calcium 9.5 10.2   Albumin 4.2 4.4   Total Protein 6.8 7.2   Sodium 141 139   Potassium 4.2 4.5   CO2 24 24   Chloride 110 107   BUN, Bld 17 16   Alkaline Phosphatase 83 78   ALT 15 17   AST 23 17   Total Bilirubin 0.4 0.5     URINALYSIS:  Recent Labs   Lab 05/21/19  1403   Color, UA yel   Spec Grav UA 1.020   pH, UA 5   Nitrite, UA n   Urobilinogen, UA n      LIPIDS:  Recent Labs   Lab 09/23/19  0835 11/11/19   TSH 2.575  --    HDL 51 50   Cholesterol 148 156   Triglycerides 101  --    LDL Cholesterol 76.8 73   Hdl/Cholesterol Ratio 34.5  --    Non-HDL Cholesterol 97  --    Total Cholesterol/HDL Ratio 2.9  --      TSH:  Recent Labs   Lab 09/23/19  0835   TSH 2.575     A1C:        Assessment/Plan     Giovanni Moncada is a 65 y.o.male with:    1. Essential hypertension  - amLODIPine (NORVASC) 5 MG tablet; Take 1 tablet (5 mg total) by mouth every morning.  Dispense: 90 tablet; Refill:  3  - doxazosin (CARDURA) 2 MG tablet; Take 0.5 tablets (1 mg total) by mouth nightly.  Dispense: 90 tablet; Refill: 3  - olmesartan (BENICAR) 40 MG tablet; Take 1 tablet (40 mg total) by mouth every evening.  Dispense: 90 tablet; Refill: 3  Continue current meds.    2. Hyperlipidemia, mixed  - atorvastatin (LIPITOR) 10 MG tablet; Take 1 tablet (10 mg total) by mouth nightly.  Dispense: 90 tablet; Refill: 3  Continue current meds.  Labs reviewed.  3. Palpitations  Continue current meds.  F/u with Dr. Rogers.  4. Benign prostatic hyperplasia without lower urinary tract symptoms  Continue current meds.  F/U with Dr. Aquino.  5. Erectile dysfunction, unspecified erectile dysfunction type  Continue current meds as needed.  Sees Dr. Aquino.  6. GERD without esophagitis  - omeprazole (PRILOSEC) 20 MG capsule; Take 1 capsule (20 mg total) by mouth once daily.  Dispense: 90 capsule; Refill: 3  Continue current meds.    7. Chronic neck pain  Continue current meds.  F/U with pain management.  8. Cardiac arrhythmia, unspecified cardiac arrhythmia type  - acebutoloL (SECTRAL) 200 MG capsule; Take 1 capsule (200 mg total) by mouth 2 (two) times daily.  Dispense: 180 capsule; Refill: 3       Chronic conditions status updated as per HPI.  Other than changes above, cont current medications and maintain follow up with specialists.  Return to clinic in 6 months.    Arnel Flaherty MD  [facility]

## 2020-10-05 DIAGNOSIS — E78.2 HYPERLIPIDEMIA, MIXED: ICD-10-CM

## 2020-10-06 RX ORDER — OMEGA-3-ACID ETHYL ESTERS 1 G/1
2 CAPSULE, LIQUID FILLED ORAL 2 TIMES DAILY
Qty: 360 CAPSULE | Refills: 1 | Status: SHIPPED | OUTPATIENT
Start: 2020-10-06 | End: 2021-05-14

## 2020-10-06 NOTE — TELEPHONE ENCOUNTER
Care Due:                  Date            Visit Type   Department     Provider  --------------------------------------------------------------------------------                                ESTABLISHED                              PATIENT      [facility] PRIMARY  Last Visit: 09-      [facility]        CARE           Arnel BARBARA John                                           [facility] PRIMARY  Next Visit: 03-      [facility]         CARE           Arnel JIMENEZ John                                                            Last  Test          Frequency    Reason                     Performed    Due Date  --------------------------------------------------------------------------------    HDL.........  12 months..  atorvastatin, omega-3....  11- 11-    LDL.........  12 months..  atorvastatin, omega-3....  Not Found    Overdue    Total         12 months..  atorvastatin, omega-3....  Not Found    Overdue  Cholesterol.    Triglyceride  12 months..  atorvastatin, omega-3....  11- 11-  s...........    Powered by [employer]. Reference number: [id number]. 10/06/2020 4:21:04 PM   CDT

## 2020-10-06 NOTE — TELEPHONE ENCOUNTER
Encounter details (provider/department) have been updated by [facility] staff.   Of note, HF details may not display.     As of this time CDM: Does not populate or display     Updated: Department    Of note. CDM should display. medication Is delegated and encounter details have been updated    Will resend refill request encounter to P Centralized Refill Staff Pool.     [facility]     Note composed:4:20 PM 10/06/2020

## 2020-10-07 NOTE — PROGRESS NOTES
Refill Authorization Note   Giovanni Moncada is requesting a refill authorization.  Brief assessment and rationale for refill: Approve     Medication Therapy Plan: CDMR. LIPID labs were completed by a historical provider(3/6/20); Will approve 6 more    Medication reconciliation completed: No   Comments:   Orders Placed This Encounter    omega-3 acid ethyl esters (LOVAZA) 1 gram capsule      Requested Prescriptions   Signed Prescriptions Disp Refills    omega-3 acid ethyl esters (LOVAZA) 1 gram capsule 360 capsule 1     Sig: Take 2 capsules (2 g total) by mouth 2 (two) times daily.       Endocrinology: Nutritional Agents - omega-3 Passed - 10/6/2020  4:20 PM        Passed - Patient is at least 18 years old        Passed - Office Visit within last 12 months or future 90 days.     Recent Outpatient Visits            3 weeks ago Essential hypertension    [facility] Primary care Arnel Flaherty MD    3 months ago Enlarged prostate without lower urinary tract symptoms (luts)    [facility] [address] Arnav Aquino MD    4 months ago Abdominal hernia without obstruction and without gangrene, recurrence not specified, unspecified hernia type    [facility] 2nd Fl Sancho Montana MD    7 months ago Essential hypertension    [facility] - Primary care Arnel Flaherty MD    1 year ago Essential hypertension    [facility] - Primary care Arnel Flaherty MD          Future Appointments              In 5 months Arnel Flaherty MD [place] [facility]                Passed - Total Cholesterol within 360 days     Cholesterol   Date Value Ref Range Status   11/11/2019 156 0 - 200 MG/DL Final   09/23/2019 148 120 - 199 mg/dL Final     Comment:     The National Cholesterol Education Program (NCEP) has set the  following guidelines (reference ranges) for Cholesterol:  Optimal.....................<200 mg/dL  Borderline High.............200-239 mg/dL  High........................>  or = 240 mg/dL                Passed - LDL within 360 days     LDL Cholesterol   Date Value Ref Range Status   11/11/2019 73 MG/DL Final            Passed - HDL within 360 days     HDL   Date Value Ref Range Status   11/11/2019 50 mg/dL Final            Passed - Triglycerides within 360 days     Triglycerides   Date Value Ref Range Status   09/23/2019 101 30 - 150 mg/dL Final     Comment:     The National Cholesterol Education Program (NCEP) has set the  following guidelines (reference values) for triglycerides:  Normal......................<150 mg/dL  Borderline High.............150-199 mg/dL  High........................200-499 mg/dL                    Appointments  past 12m or future 3m with PCP    Date Provider   Last Visit   9/11/2020 Arnel Flaherty MD   Next Visit   3/12/2021 Arnel Flaherty MD   ED visits in past 90 days: 0     Note composed:9:13 PM 10/06/2020

## 2021-01-27 ENCOUNTER — PATIENT OUTREACH (OUTPATIENT)
Dept: ADMINISTRATIVE | Facility: HOSPITAL | Age: 66
End: 2021-01-27

## 2021-01-27 ENCOUNTER — TELEPHONE (OUTPATIENT)
Dept: ADMINISTRATIVE | Facility: HOSPITAL | Age: 66
End: 2021-01-27

## 2021-03-12 ENCOUNTER — OFFICE VISIT (OUTPATIENT)
Dept: PRIMARY CARE CLINIC | Facility: CLINIC | Age: 66
End: 2021-03-12
Payer: COMMERCIAL

## 2021-03-12 VITALS
SYSTOLIC BLOOD PRESSURE: 116 MMHG | BODY MASS INDEX: 27.82 KG/M2 | OXYGEN SATURATION: 98 % | WEIGHT: 187.81 LBS | HEIGHT: 69 IN | DIASTOLIC BLOOD PRESSURE: 76 MMHG | HEART RATE: 57 BPM

## 2021-03-12 DIAGNOSIS — G89.29 CHRONIC NECK PAIN: ICD-10-CM

## 2021-03-12 DIAGNOSIS — M54.2 CHRONIC NECK PAIN: ICD-10-CM

## 2021-03-12 DIAGNOSIS — I10 ESSENTIAL HYPERTENSION: Primary | ICD-10-CM

## 2021-03-12 DIAGNOSIS — N52.9 ERECTILE DYSFUNCTION, UNSPECIFIED ERECTILE DYSFUNCTION TYPE: ICD-10-CM

## 2021-03-12 DIAGNOSIS — J30.9 ALLERGIC RHINITIS, UNSPECIFIED SEASONALITY, UNSPECIFIED TRIGGER: ICD-10-CM

## 2021-03-12 DIAGNOSIS — R00.2 PALPITATIONS: ICD-10-CM

## 2021-03-12 DIAGNOSIS — R42 VERTIGO: ICD-10-CM

## 2021-03-12 DIAGNOSIS — K21.9 GERD WITHOUT ESOPHAGITIS: ICD-10-CM

## 2021-03-12 DIAGNOSIS — E78.2 HYPERLIPIDEMIA, MIXED: ICD-10-CM

## 2021-03-12 DIAGNOSIS — N40.0 BENIGN PROSTATIC HYPERPLASIA WITHOUT LOWER URINARY TRACT SYMPTOMS: ICD-10-CM

## 2021-03-12 PROCEDURE — 1101F PT FALLS ASSESS-DOCD LE1/YR: CPT | Mod: CPTII,S$GLB,, | Performed by: INTERNAL MEDICINE

## 2021-03-12 PROCEDURE — 99214 PR OFFICE/OUTPT VISIT, EST, LEVL IV, 30-39 MIN: ICD-10-PCS | Mod: S$GLB,,, | Performed by: INTERNAL MEDICINE

## 2021-03-12 PROCEDURE — 99999 PR PBB SHADOW E&M-EST. PATIENT-LVL IV: CPT | Mod: PBBFAC,,, | Performed by: INTERNAL MEDICINE

## 2021-03-12 PROCEDURE — 3078F PR MOST RECENT DIASTOLIC BLOOD PRESSURE < 80 MM HG: ICD-10-PCS | Mod: CPTII,S$GLB,, | Performed by: INTERNAL MEDICINE

## 2021-03-12 PROCEDURE — 1126F AMNT PAIN NOTED NONE PRSNT: CPT | Mod: S$GLB,,, | Performed by: INTERNAL MEDICINE

## 2021-03-12 PROCEDURE — 3288F PR FALLS RISK ASSESSMENT DOCUMENTED: ICD-10-PCS | Mod: CPTII,S$GLB,, | Performed by: INTERNAL MEDICINE

## 2021-03-12 PROCEDURE — 3008F BODY MASS INDEX DOCD: CPT | Mod: CPTII,S$GLB,, | Performed by: INTERNAL MEDICINE

## 2021-03-12 PROCEDURE — 99214 OFFICE O/P EST MOD 30 MIN: CPT | Mod: S$GLB,,, | Performed by: INTERNAL MEDICINE

## 2021-03-12 PROCEDURE — 3074F SYST BP LT 130 MM HG: CPT | Mod: CPTII,S$GLB,, | Performed by: INTERNAL MEDICINE

## 2021-03-12 PROCEDURE — 1159F MED LIST DOCD IN RCRD: CPT | Mod: S$GLB,,, | Performed by: INTERNAL MEDICINE

## 2021-03-12 PROCEDURE — 1126F PR PAIN SEVERITY QUANTIFIED, NO PAIN PRESENT: ICD-10-PCS | Mod: S$GLB,,, | Performed by: INTERNAL MEDICINE

## 2021-03-12 PROCEDURE — 3288F FALL RISK ASSESSMENT DOCD: CPT | Mod: CPTII,S$GLB,, | Performed by: INTERNAL MEDICINE

## 2021-03-12 PROCEDURE — 1159F PR MEDICATION LIST DOCUMENTED IN MEDICAL RECORD: ICD-10-PCS | Mod: S$GLB,,, | Performed by: INTERNAL MEDICINE

## 2021-03-12 PROCEDURE — 99999 PR PBB SHADOW E&M-EST. PATIENT-LVL IV: ICD-10-PCS | Mod: PBBFAC,,, | Performed by: INTERNAL MEDICINE

## 2021-03-12 PROCEDURE — 3078F DIAST BP <80 MM HG: CPT | Mod: CPTII,S$GLB,, | Performed by: INTERNAL MEDICINE

## 2021-03-12 PROCEDURE — 1101F PR PT FALLS ASSESS DOC 0-1 FALLS W/OUT INJ PAST YR: ICD-10-PCS | Mod: CPTII,S$GLB,, | Performed by: INTERNAL MEDICINE

## 2021-03-12 PROCEDURE — 3074F PR MOST RECENT SYSTOLIC BLOOD PRESSURE < 130 MM HG: ICD-10-PCS | Mod: CPTII,S$GLB,, | Performed by: INTERNAL MEDICINE

## 2021-03-12 PROCEDURE — 3008F PR BODY MASS INDEX (BMI) DOCUMENTED: ICD-10-PCS | Mod: CPTII,S$GLB,, | Performed by: INTERNAL MEDICINE

## 2021-03-12 RX ORDER — FLUTICASONE PROPIONATE 50 MCG
1 SPRAY, SUSPENSION (ML) NASAL DAILY
Qty: 16 G | Refills: 11 | Status: SHIPPED | OUTPATIENT
Start: 2021-03-12 | End: 2022-10-03

## 2021-03-12 RX ORDER — OLMESARTAN MEDOXOMIL 20 MG/1
20 TABLET ORAL NIGHTLY
Qty: 90 TABLET | Refills: 3 | Status: SHIPPED | OUTPATIENT
Start: 2021-03-12 | End: 2022-03-11

## 2021-03-12 RX ORDER — ZINC GLUCONATE 50 MG
50 TABLET ORAL DAILY
COMMUNITY
End: 2023-11-15 | Stop reason: SDUPTHER

## 2021-03-12 RX ORDER — DOXAZOSIN 1 MG/1
1 TABLET ORAL NIGHTLY
Qty: 90 TABLET | Refills: 3 | Status: SHIPPED | OUTPATIENT
Start: 2021-03-12 | End: 2022-03-10

## 2021-05-11 DIAGNOSIS — K21.9 GERD WITHOUT ESOPHAGITIS: ICD-10-CM

## 2021-05-11 DIAGNOSIS — E78.2 HYPERLIPIDEMIA, MIXED: ICD-10-CM

## 2021-05-14 RX ORDER — ATORVASTATIN CALCIUM 10 MG/1
10 TABLET, FILM COATED ORAL NIGHTLY
Qty: 90 TABLET | Refills: 0 | Status: SHIPPED | OUTPATIENT
Start: 2021-05-14 | End: 2022-03-11

## 2021-05-14 RX ORDER — OMEGA-3-ACID ETHYL ESTERS 1 G/1
2 CAPSULE, LIQUID FILLED ORAL 2 TIMES DAILY
Qty: 360 CAPSULE | Refills: 0 | Status: SHIPPED | OUTPATIENT
Start: 2021-05-14 | End: 2022-11-28

## 2021-05-14 RX ORDER — OMEPRAZOLE 20 MG/1
20 CAPSULE, DELAYED RELEASE ORAL DAILY
Qty: 90 CAPSULE | Refills: 3 | Status: SHIPPED | OUTPATIENT
Start: 2021-05-14 | End: 2022-03-14 | Stop reason: SDUPTHER

## 2021-06-17 ENCOUNTER — TELEPHONE (OUTPATIENT)
Dept: FAMILY MEDICINE | Facility: CLINIC | Age: 66
End: 2021-06-17

## 2021-08-05 ENCOUNTER — OFFICE VISIT (OUTPATIENT)
Dept: UROLOGY | Facility: CLINIC | Age: 66
End: 2021-08-05
Payer: COMMERCIAL

## 2021-08-05 VITALS
WEIGHT: 187.81 LBS | DIASTOLIC BLOOD PRESSURE: 69 MMHG | HEIGHT: 69 IN | SYSTOLIC BLOOD PRESSURE: 114 MMHG | HEART RATE: 51 BPM | BODY MASS INDEX: 27.82 KG/M2

## 2021-08-05 DIAGNOSIS — N52.9 ERECTILE DYSFUNCTION, UNSPECIFIED ERECTILE DYSFUNCTION TYPE: ICD-10-CM

## 2021-08-05 DIAGNOSIS — N40.0 ENLARGED PROSTATE WITHOUT LOWER URINARY TRACT SYMPTOMS (LUTS): Primary | ICD-10-CM

## 2021-08-05 PROCEDURE — 1126F AMNT PAIN NOTED NONE PRSNT: CPT | Mod: CPTII,S$GLB,, | Performed by: UROLOGY

## 2021-08-05 PROCEDURE — 1101F PR PT FALLS ASSESS DOC 0-1 FALLS W/OUT INJ PAST YR: ICD-10-PCS | Mod: CPTII,S$GLB,, | Performed by: UROLOGY

## 2021-08-05 PROCEDURE — 3008F PR BODY MASS INDEX (BMI) DOCUMENTED: ICD-10-PCS | Mod: CPTII,S$GLB,, | Performed by: UROLOGY

## 2021-08-05 PROCEDURE — 3008F BODY MASS INDEX DOCD: CPT | Mod: CPTII,S$GLB,, | Performed by: UROLOGY

## 2021-08-05 PROCEDURE — 99214 PR OFFICE/OUTPT VISIT, EST, LEVL IV, 30-39 MIN: ICD-10-PCS | Mod: S$GLB,,, | Performed by: UROLOGY

## 2021-08-05 PROCEDURE — 3288F PR FALLS RISK ASSESSMENT DOCUMENTED: ICD-10-PCS | Mod: CPTII,S$GLB,, | Performed by: UROLOGY

## 2021-08-05 PROCEDURE — 1159F MED LIST DOCD IN RCRD: CPT | Mod: CPTII,S$GLB,, | Performed by: UROLOGY

## 2021-08-05 PROCEDURE — 3078F DIAST BP <80 MM HG: CPT | Mod: CPTII,S$GLB,, | Performed by: UROLOGY

## 2021-08-05 PROCEDURE — 3288F FALL RISK ASSESSMENT DOCD: CPT | Mod: CPTII,S$GLB,, | Performed by: UROLOGY

## 2021-08-05 PROCEDURE — 3078F PR MOST RECENT DIASTOLIC BLOOD PRESSURE < 80 MM HG: ICD-10-PCS | Mod: CPTII,S$GLB,, | Performed by: UROLOGY

## 2021-08-05 PROCEDURE — 3074F SYST BP LT 130 MM HG: CPT | Mod: CPTII,S$GLB,, | Performed by: UROLOGY

## 2021-08-05 PROCEDURE — 1159F PR MEDICATION LIST DOCUMENTED IN MEDICAL RECORD: ICD-10-PCS | Mod: CPTII,S$GLB,, | Performed by: UROLOGY

## 2021-08-05 PROCEDURE — 1101F PT FALLS ASSESS-DOCD LE1/YR: CPT | Mod: CPTII,S$GLB,, | Performed by: UROLOGY

## 2021-08-05 PROCEDURE — 99214 OFFICE O/P EST MOD 30 MIN: CPT | Mod: S$GLB,,, | Performed by: UROLOGY

## 2021-08-05 PROCEDURE — 3074F PR MOST RECENT SYSTOLIC BLOOD PRESSURE < 130 MM HG: ICD-10-PCS | Mod: CPTII,S$GLB,, | Performed by: UROLOGY

## 2021-08-05 PROCEDURE — 1126F PR PAIN SEVERITY QUANTIFIED, NO PAIN PRESENT: ICD-10-PCS | Mod: CPTII,S$GLB,, | Performed by: UROLOGY

## 2021-08-05 RX ORDER — TADALAFIL 20 MG/1
20 TABLET ORAL DAILY PRN
Qty: 12 TABLET | Refills: 11 | Status: SHIPPED | OUTPATIENT
Start: 2021-08-05 | End: 2021-10-04 | Stop reason: SDUPTHER

## 2021-08-05 RX ORDER — TRAMADOL HYDROCHLORIDE 50 MG/1
50 TABLET ORAL EVERY 6 HOURS PRN
COMMUNITY
Start: 2021-06-28 | End: 2022-03-14

## 2021-08-05 RX ORDER — LORAZEPAM 2 MG/1
2 TABLET ORAL NIGHTLY
COMMUNITY
Start: 2021-06-28 | End: 2022-03-14

## 2021-08-05 RX ORDER — OXYCODONE HYDROCHLORIDE 5 MG/1
5 TABLET ORAL EVERY 6 HOURS
COMMUNITY
Start: 2021-06-28 | End: 2022-03-14

## 2021-08-05 RX ORDER — GABAPENTIN 300 MG/1
300 CAPSULE ORAL NIGHTLY
COMMUNITY
Start: 2021-06-28 | End: 2022-03-14

## 2021-09-04 DIAGNOSIS — I49.9 CARDIAC ARRHYTHMIA, UNSPECIFIED CARDIAC ARRHYTHMIA TYPE: ICD-10-CM

## 2021-09-07 RX ORDER — ACEBUTOLOL HYDROCHLORIDE 200 MG/1
200 CAPSULE ORAL 2 TIMES DAILY
Qty: 180 CAPSULE | Refills: 3 | Status: SHIPPED | OUTPATIENT
Start: 2021-09-07 | End: 2022-03-14 | Stop reason: SDUPTHER

## 2021-09-13 ENCOUNTER — OFFICE VISIT (OUTPATIENT)
Dept: PRIMARY CARE CLINIC | Facility: CLINIC | Age: 66
End: 2021-09-13
Payer: MEDICARE

## 2021-09-13 VITALS
SYSTOLIC BLOOD PRESSURE: 132 MMHG | DIASTOLIC BLOOD PRESSURE: 82 MMHG | WEIGHT: 182.75 LBS | TEMPERATURE: 98 F | OXYGEN SATURATION: 98 % | HEIGHT: 69 IN | BODY MASS INDEX: 27.07 KG/M2 | HEART RATE: 40 BPM

## 2021-09-13 DIAGNOSIS — M54.2 CHRONIC NECK PAIN: ICD-10-CM

## 2021-09-13 DIAGNOSIS — R00.2 PALPITATIONS: ICD-10-CM

## 2021-09-13 DIAGNOSIS — N52.9 ERECTILE DYSFUNCTION, UNSPECIFIED ERECTILE DYSFUNCTION TYPE: ICD-10-CM

## 2021-09-13 DIAGNOSIS — E78.2 HYPERLIPIDEMIA, MIXED: ICD-10-CM

## 2021-09-13 DIAGNOSIS — Z00.00 ANNUAL PHYSICAL EXAM: Primary | ICD-10-CM

## 2021-09-13 DIAGNOSIS — I10 ESSENTIAL HYPERTENSION: ICD-10-CM

## 2021-09-13 DIAGNOSIS — K21.9 GERD WITHOUT ESOPHAGITIS: ICD-10-CM

## 2021-09-13 DIAGNOSIS — G89.29 CHRONIC NECK PAIN: ICD-10-CM

## 2021-09-13 DIAGNOSIS — N40.0 BENIGN PROSTATIC HYPERPLASIA WITHOUT LOWER URINARY TRACT SYMPTOMS: ICD-10-CM

## 2021-09-13 PROCEDURE — 99397 PR PREVENTIVE VISIT,EST,65 & OVER: ICD-10-PCS | Mod: S$PBB,GZ,, | Performed by: INTERNAL MEDICINE

## 2021-09-13 PROCEDURE — 99397 PER PM REEVAL EST PAT 65+ YR: CPT | Mod: S$PBB,GZ,, | Performed by: INTERNAL MEDICINE

## 2021-09-13 PROCEDURE — 99999 PR PBB SHADOW E&M-EST. PATIENT-LVL III: ICD-10-PCS | Mod: PBBFAC,,, | Performed by: INTERNAL MEDICINE

## 2021-09-13 PROCEDURE — 99213 OFFICE O/P EST LOW 20 MIN: CPT | Mod: PBBFAC,PN | Performed by: INTERNAL MEDICINE

## 2021-09-13 PROCEDURE — 99999 PR PBB SHADOW E&M-EST. PATIENT-LVL III: CPT | Mod: PBBFAC,,, | Performed by: INTERNAL MEDICINE

## 2021-12-04 DIAGNOSIS — I10 ESSENTIAL HYPERTENSION: ICD-10-CM

## 2021-12-06 RX ORDER — AMLODIPINE BESYLATE 5 MG/1
5 TABLET ORAL EVERY MORNING
Qty: 90 TABLET | Refills: 3 | Status: SHIPPED | OUTPATIENT
Start: 2021-12-06 | End: 2022-03-14 | Stop reason: SDUPTHER

## 2022-03-03 DIAGNOSIS — I10 ESSENTIAL HYPERTENSION: ICD-10-CM

## 2022-03-03 DIAGNOSIS — E78.2 HYPERLIPIDEMIA, MIXED: ICD-10-CM

## 2022-03-03 NOTE — TELEPHONE ENCOUNTER
Care Due:                  Date            Visit Type   Department     Provider  --------------------------------------------------------------------------------                                ESTABLISHED                              PATIENT      [facility] PRIMARY  Last Visit: 09-      SHORT        CARE           Arnel Lopez                              ESTABLISHED                              PATIENT      [facility] PRIMARY  Next Visit: 03-      SHORT        [facility]           Arnel Lopez                                                            Last  Test          Frequency    Reason                     Performed    Due Date  --------------------------------------------------------------------------------    CMP.........  12 months..  atorvastatin, olmesartan,   05- 05-                             omega-3..................    Lipid Panel.  12 months..  atorvastatin, omega-3....  Not Found    Overdue    Powered by [employer]. Reference number: [id number].   3/03/2022 8:34:21 AM CST

## 2022-03-08 ENCOUNTER — TELEPHONE (OUTPATIENT)
Dept: INTERNAL MEDICINE | Facility: CLINIC | Age: 67
End: 2022-03-08
Payer: MEDICARE

## 2022-03-08 DIAGNOSIS — Z11.4 SCREENING FOR HIV (HUMAN IMMUNODEFICIENCY VIRUS): Primary | ICD-10-CM

## 2022-03-10 RX ORDER — DOXAZOSIN 1 MG/1
1 TABLET ORAL NIGHTLY
Qty: 90 TABLET | Refills: 1 | Status: SHIPPED | OUTPATIENT
Start: 2022-03-10 | End: 2022-03-14 | Stop reason: SDUPTHER

## 2022-03-10 NOTE — TELEPHONE ENCOUNTER
Refill Routing Note   Medication(s) are not appropriate for processing by [facility] for the following reason(s):      - Required laboratory values are outdated    [facility] action(s):  Defer  Approve          --->Care Gap information included in message below if applicable.   Medication reconciliation completed: No   Automatic Epic Generated Protocol Data:        Requested Prescriptions   Pending Prescriptions Disp Refills    olmesartan (BENICAR) 20 MG tablet [Pharmacy Med Name: olmesartan 20 mg tablet] 90 tablet 0     Sig: Take 1 tablet (20 mg total) by mouth every evening.       Cardiovascular:  Angiotensin Receptor Blockers Failed - 3/10/2022 11:30 AM        Failed - Cr is 1.39 or below and within 360 days     Lab Results   Component Value Date    CREATININE 0.9 07/06/2020    CREATININE 1.0 05/29/2020    CREATININE 1.0 09/23/2019              Failed - K is 5.2 or below and within 360 days     Potassium   Date Value Ref Range Status   05/29/2020 4.5 3.5 - 5.1 mmol/L Final   09/23/2019 4.2 3.5 - 5.1 mmol/L Final              Failed - eGFR within 360 days     Lab Results   Component Value Date    EGFRNONAA >60 07/06/2020    EGFRNONAA >60 05/29/2020    EGFRNONAA >60.0 09/23/2019                Passed - Patient is at least 18 years old        Passed - Last BP in normal range within 360 days     BP Readings from Last 1 Encounters:   09/13/21 132/82               Passed - Valid encounter within last 15 months     Recent Visits  Date Type Provider Dept   09/13/21 Office Visit Arnel Flaherty MD [facility] Care   03/12/21 Office Visit Arnel Flaherty MD [facility] Care   09/11/20 Office Visit Arnel Flaherty MD [facility] Primary Care   Showing recent visits within past 720 days and meeting all other requirements  Future Appointments  No visits were found meeting these conditions.  Showing future appointments within next 150 days and meeting all other requirements      Future Appointments              Tomorrow  [facility]    In 4 days MD [facility] - [facility] [address], [place]                  doxazosin (CARDURA) 1 MG tablet [Pharmacy Med Name: doxazosin 1 mg tablet] 90 tablet 1     Sig: Take 1 tablet (1 mg total) by mouth nightly.       Cardiovascular:  Alpha Blockers Passed - 3/10/2022 11:30 AM        Passed - Patient is at least 18 years old        Passed - Last BP in normal range within 360 days     BP Readings from Last 1 Encounters:   09/13/21 132/82               Passed - Valid encounter within last 15 months     Recent Visits  Date Type Provider Dept   09/13/21 Office Visit Arnel Flaherty MD [facility]   03/12/21 Office Visit Arnel Flaherty MD [facility]   09/11/20 Office Visit Arnel Flaherty MD [facility]   Showing recent visits within past 720 days and meeting all other requirements  Future Appointments  No visits were found meeting these conditions.  Showing future appointments within next 150 days and meeting all other requirements      Future Appointments              Tomorrow [facility]    In 4 days MD An[facility] - [facility] [address], [place]                  atorvastatin (LIPITOR) 10 MG tablet [Pharmacy Med Name: atorvastatin 10 mg tablet] 90 tablet 0     Sig: Take 1 tablet (10 mg total) by mouth nightly.       Cardiovascular:  Antilipid - Statins Failed - 3/10/2022 11:30 AM        Failed - ALT is 131 or below and within 360 days     ALT   Date Value Ref Range Status   05/29/2020 17 10 - 44 U/L Final   09/23/2019 15 10 - 44 U/L Final              Failed - AST is 119 or below and within 360 days     AST   Date Value Ref Range Status   05/29/2020 17 10 - 40 U/L Final   09/23/2019 23 10 - 40 U/L Final              Failed - Total Cholesterol within 360 days     Lab Results   Component Value Date    CHOL 156 11/11/2019    CHOL 148 09/23/2019              Failed - LDL  within 360 days     LDL Cholesterol   Date Value Ref Range Status   11/11/2019 73 MG/DL Final            Failed - HDL within 360 days     HDL   Date Value Ref Range Status   11/11/2019 50 mg/dL Final            Failed - Triglycerides within 360 days     Lab Results   Component Value Date    TRIG 101 09/23/2019              Passed - Patient is at least 18 years old        Passed - Valid encounter within last 15 months     Recent Visits  Date Type Provider Dept   09/13/21 Office Visit Arnel Flaherty MD [facility]   03/12/21 Office Visit Arnel Flaherty MD [facility] Care   09/11/20 Office Visit Arnel Flaherty MD [facility] Care   Showing recent visits within past 720 days and meeting all other requirements  Future Appointments  No visits were found meeting these conditions.  Showing future appointments within next 150 days and meeting all other requirements      Future Appointments              Tomorrow [facility], [facility] [place]    In 4 days MD [facility]                      Appointments  past 12m or future 3m with PCP    Date Provider   Last Visit   9/13/2021 Arnel Flaherty MD   Next Visit   3/8/2022 Arnel Flaherty MD   ED visits in past 90 days: 0        Note composed:11:37 AM 03/10/2022

## 2022-03-11 ENCOUNTER — LAB VISIT (OUTPATIENT)
Dept: LAB | Facility: HOSPITAL | Age: 67
End: 2022-03-11
Attending: INTERNAL MEDICINE
Payer: MEDICARE

## 2022-03-11 DIAGNOSIS — Z11.4 SCREENING FOR HIV (HUMAN IMMUNODEFICIENCY VIRUS): ICD-10-CM

## 2022-03-11 DIAGNOSIS — N40.0 ENLARGED PROSTATE WITHOUT LOWER URINARY TRACT SYMPTOMS (LUTS): ICD-10-CM

## 2022-03-11 DIAGNOSIS — N52.9 ERECTILE DYSFUNCTION, UNSPECIFIED ERECTILE DYSFUNCTION TYPE: ICD-10-CM

## 2022-03-11 DIAGNOSIS — E78.2 HYPERLIPIDEMIA, MIXED: ICD-10-CM

## 2022-03-11 LAB
ALBUMIN SERPL BCP-MCNC: 4.2 G/DL (ref 3.5–5.2)
ALP SERPL-CCNC: 80 U/L (ref 55–135)
ALT SERPL W/O P-5'-P-CCNC: 15 U/L (ref 10–44)
ANION GAP SERPL CALC-SCNC: 10 MMOL/L (ref 8–16)
AST SERPL-CCNC: 21 U/L (ref 10–40)
BILIRUB SERPL-MCNC: 0.6 MG/DL (ref 0.1–1)
BUN SERPL-MCNC: 18 MG/DL (ref 8–23)
CALCIUM SERPL-MCNC: 10.5 MG/DL (ref 8.7–10.5)
CHLORIDE SERPL-SCNC: 105 MMOL/L (ref 95–110)
CHOLEST SERPL-MCNC: 136 MG/DL (ref 120–199)
CHOLEST/HDLC SERPL: 2.4 {RATIO} (ref 2–5)
CO2 SERPL-SCNC: 25 MMOL/L (ref 23–29)
COMPLEXED PSA SERPL-MCNC: 2.1 NG/ML (ref 0–4)
CREAT SERPL-MCNC: 0.8 MG/DL (ref 0.5–1.4)
EST. GFR  (AFRICAN AMERICAN): >60 ML/MIN/1.73 M^2
EST. GFR  (NON AFRICAN AMERICAN): >60 ML/MIN/1.73 M^2
GLUCOSE SERPL-MCNC: 89 MG/DL (ref 70–110)
HDLC SERPL-MCNC: 57 MG/DL (ref 40–75)
HDLC SERPL: 41.9 % (ref 20–50)
LDLC SERPL CALC-MCNC: 64.8 MG/DL (ref 63–159)
NONHDLC SERPL-MCNC: 79 MG/DL
POTASSIUM SERPL-SCNC: 4.3 MMOL/L (ref 3.5–5.1)
PROT SERPL-MCNC: 7 G/DL (ref 6–8.4)
SODIUM SERPL-SCNC: 140 MMOL/L (ref 136–145)
TRIGL SERPL-MCNC: 71 MG/DL (ref 30–150)

## 2022-03-11 PROCEDURE — 87389 HIV-1 AG W/HIV-1&-2 AB AG IA: CPT | Performed by: INTERNAL MEDICINE

## 2022-03-11 PROCEDURE — 36415 COLL VENOUS BLD VENIPUNCTURE: CPT | Mod: PO | Performed by: UROLOGY

## 2022-03-11 PROCEDURE — 80061 LIPID PANEL: CPT | Performed by: INTERNAL MEDICINE

## 2022-03-11 PROCEDURE — 84153 ASSAY OF PSA TOTAL: CPT | Performed by: UROLOGY

## 2022-03-11 PROCEDURE — 80053 COMPREHEN METABOLIC PANEL: CPT | Performed by: INTERNAL MEDICINE

## 2022-03-11 RX ORDER — OLMESARTAN MEDOXOMIL 20 MG/1
20 TABLET ORAL NIGHTLY
Qty: 90 TABLET | Refills: 3 | Status: SHIPPED | OUTPATIENT
Start: 2022-03-11 | End: 2023-04-05

## 2022-03-11 RX ORDER — ATORVASTATIN CALCIUM 10 MG/1
10 TABLET, FILM COATED ORAL NIGHTLY
Qty: 90 TABLET | Refills: 3 | Status: SHIPPED | OUTPATIENT
Start: 2022-03-11 | End: 2023-04-06 | Stop reason: SDUPTHER

## 2022-03-14 ENCOUNTER — OFFICE VISIT (OUTPATIENT)
Dept: INTERNAL MEDICINE | Facility: CLINIC | Age: 67
End: 2022-03-14
Payer: MEDICARE

## 2022-03-14 VITALS
SYSTOLIC BLOOD PRESSURE: 118 MMHG | DIASTOLIC BLOOD PRESSURE: 70 MMHG | BODY MASS INDEX: 28.23 KG/M2 | WEIGHT: 190.56 LBS | OXYGEN SATURATION: 98 % | HEIGHT: 69 IN | HEART RATE: 57 BPM

## 2022-03-14 DIAGNOSIS — N52.9 ERECTILE DYSFUNCTION, UNSPECIFIED ERECTILE DYSFUNCTION TYPE: ICD-10-CM

## 2022-03-14 DIAGNOSIS — M54.2 CHRONIC NECK PAIN: ICD-10-CM

## 2022-03-14 DIAGNOSIS — N40.0 BENIGN PROSTATIC HYPERPLASIA WITHOUT LOWER URINARY TRACT SYMPTOMS: ICD-10-CM

## 2022-03-14 DIAGNOSIS — I49.9 CARDIAC ARRHYTHMIA, UNSPECIFIED CARDIAC ARRHYTHMIA TYPE: ICD-10-CM

## 2022-03-14 DIAGNOSIS — K21.9 GERD WITHOUT ESOPHAGITIS: ICD-10-CM

## 2022-03-14 DIAGNOSIS — E78.2 HYPERLIPIDEMIA, MIXED: ICD-10-CM

## 2022-03-14 DIAGNOSIS — G89.29 CHRONIC NECK PAIN: ICD-10-CM

## 2022-03-14 DIAGNOSIS — I10 ESSENTIAL HYPERTENSION: Primary | ICD-10-CM

## 2022-03-14 PROCEDURE — 99214 OFFICE O/P EST MOD 30 MIN: CPT | Mod: S$PBB,,, | Performed by: INTERNAL MEDICINE

## 2022-03-14 PROCEDURE — 99214 PR OFFICE/OUTPT VISIT, EST, LEVL IV, 30-39 MIN: ICD-10-PCS | Mod: S$PBB,,, | Performed by: INTERNAL MEDICINE

## 2022-03-14 PROCEDURE — 99215 OFFICE O/P EST HI 40 MIN: CPT | Mod: PBBFAC | Performed by: INTERNAL MEDICINE

## 2022-03-14 PROCEDURE — 99999 PR PBB SHADOW E&M-EST. PATIENT-LVL V: CPT | Mod: PBBFAC,,, | Performed by: INTERNAL MEDICINE

## 2022-03-14 PROCEDURE — 99999 PR PBB SHADOW E&M-EST. PATIENT-LVL V: ICD-10-PCS | Mod: PBBFAC,,, | Performed by: INTERNAL MEDICINE

## 2022-03-14 RX ORDER — OMEPRAZOLE 20 MG/1
20 CAPSULE, DELAYED RELEASE ORAL DAILY
Qty: 90 CAPSULE | Refills: 3 | Status: SHIPPED | OUTPATIENT
Start: 2022-03-14 | End: 2023-04-05

## 2022-03-14 RX ORDER — ACEBUTOLOL HYDROCHLORIDE 200 MG/1
200 CAPSULE ORAL 2 TIMES DAILY
Qty: 180 CAPSULE | Refills: 3 | Status: SHIPPED | OUTPATIENT
Start: 2022-03-14 | End: 2023-06-05

## 2022-03-14 RX ORDER — DOXAZOSIN 1 MG/1
1 TABLET ORAL NIGHTLY
Qty: 90 TABLET | Refills: 3 | Status: SHIPPED | OUTPATIENT
Start: 2022-03-14 | End: 2023-04-06 | Stop reason: SDUPTHER

## 2022-03-14 RX ORDER — AMLODIPINE BESYLATE 5 MG/1
5 TABLET ORAL EVERY MORNING
Qty: 90 TABLET | Refills: 3 | Status: SHIPPED | OUTPATIENT
Start: 2022-03-14 | End: 2023-04-06 | Stop reason: SDUPTHER

## 2022-03-14 NOTE — PROGRESS NOTES
[facility] Note    Chief Complaint      Chief Complaint   Patient presents with    Hypertension     6 month f/u       History of Present Illness      Giovanni Moncada is a 66 y.o. male with chronic conditions of HTN, HLD, BPH, GERD, ED, hx of squamous cell carcinoma of neck who presents today for: follow up chronic conditions.  HTN: BP at goal on benicar, amlodipine, acebutolol, doxazosin.  HLD: controlled on atorvastatin.  LDL 64.  Sees Dr. Rogers.  ED: Sees Dr. Aquino, urology.  On viagra  GERD:  Sees Dr. Garcia. Controlled on omeprazole.  No new or worsening symptoms.   Cardiac arrhythmia: Sees Dr. Rogers.  Previous diagnosed during stress test.  On acebutolol for rate control.  BPH: Sees Dr. Aquino.  Controlled on doxazosin.  No new or worsening symptoms  Hx of squamous cell carcinoma neck s/p resection: no new changes.  Diet: Prepares own food mostly.  Limiting fatty foods and carbs.  Fresh fruit, meat.  Drinks plenty water.  Exercise: Stays active.  Tracking steps.    Denies drinking and driving, drinking more than 4 drinks on occasion, drug use.     Flu shot declines.  Td 2005.  Pneumonia vaccines declines.  Shingles vaccine declines.  COVID vaccine UTD.  Cscope 2021, Dr. Mendes, polyps, 5 yr interval.    Past Medical History:  Past Medical History:   Diagnosis Date    Hypercholesteremia     Hypertension        Past Surgical History:   has a past surgical history that includes Neck surgery; Cataract extraction, bilateral (02/2020);  colonoscopy (01/07/2016); colonoscopy (01/26/2021); Eye surgery (2020); Hernia repair (2020); Tonsillectomy (1964); and Cholecystectomy (2020).    Family History:  family history includes Cancer in his mother; Heart disease in his father.     Social History:  Social History     Tobacco Use    Smoking status: Never Smoker    Smokeless tobacco: Never Used   Substance Use Topics    Alcohol use: Yes     Alcohol/week: 8.0 standard drinks     Types: 4 Glasses  of wine, 4 Drinks containing 0.5 oz of alcohol per week    Drug use: No       I personally reviewed all past medical, surgical, social and family history.    Review of Systems   HENT: Negative for hearing loss.    Eyes: Positive for discharge.   Respiratory: Negative for wheezing.    Cardiovascular: Negative for chest pain and palpitations.   Gastrointestinal: Negative for blood in stool, constipation, diarrhea and vomiting.   Genitourinary: Positive for urgency. Negative for hematuria.   Musculoskeletal: Positive for neck pain.   Neurological: Negative for weakness and headaches.   Endo/Heme/Allergies: Negative for polydipsia.        Medications:  Outpatient Encounter Medications as of 3/14/2022   Medication Sig Dispense Refill    a-cysteine/arg zinc/glut/mv-mn (L GLUTAMINE-N ACET-ARG-VIT-MIN ORAL) Take by mouth.      acebutoloL (SECTRAL) 200 MG capsule Take 1 capsule (200 mg total) by mouth 2 (two) times daily. 180 capsule 3    amLODIPine (NORVASC) 5 MG tablet Take 1 tablet (5 mg total) by mouth every morning. 90 tablet 3    ascorbic acid, vitamin C, (VITAMIN C WITH DADA HIPS) 1000 MG tablet Take 1,000 mg by mouth.      aspirin (ECOTRIN) 81 MG EC tablet Take 81 mg by mouth 2 (two) times daily.      atorvastatin (LIPITOR) 10 MG tablet Take 1 tablet (10 mg total) by mouth nightly. 90 tablet 3    bacillus coagulans-inulin (PROBIOTIC FORMULA, INULIN,) 1 billion-250 cell-mg Cap Take by mouth.      cholecalciferol, vitamin D3, 5,000 unit Tab Take by mouth.      coenzyme Q10 (CO Q-10) 100 mg capsule Take 100 mg by mouth once daily.      cyanocobalamin (VITAMIN B-12) 1000 MCG tablet Take 100 mcg by mouth once daily.      doxazosin (CARDURA) 1 MG tablet Take 1 tablet (1 mg total) by mouth nightly. 90 tablet 3    ergocalciferol (ERGOCALCIFEROL) 50,000 unit Cap Take 50,000 Units by mouth every 7 days.      fluticasone propionate (FLONASE) 50 mcg/actuation nasal spray 1 spray (50 mcg total) by Each Nostril route  once daily. 16 g 11    glucosamine-chondroit-vit C-Mn (GLUCOSAMINE 1500 COMPLEX) 500-400 mg Cap Take by mouth.      GLUCOSAMINE/CHONDR MARI A SOD (OSTEO BI-FLEX ORAL) Take by mouth.      olmesartan (BENICAR) 20 MG tablet Take 1 tablet (20 mg total) by mouth every evening. 90 tablet 3    omega-3 acid ethyl esters (LOVAZA) 1 gram capsule Take 2 capsules (2 g total) by mouth 2 (two) times daily. 360 capsule 0    omeprazole (PRILOSEC) 20 MG capsule Take 1 capsule (20 mg total) by mouth once daily. 90 capsule 3    sildenafiL (VIAGRA) 100 MG tablet Take 1 tablet (100 mg total) by mouth daily as needed for Erectile Dysfunction. 30 tablet 11    vitamin A acetate 10,000 unit Subl Place 2,400 mcg under the tongue.      zinc acetate 50 mg (zinc) Cap Take 50 mg by mouth.      zinc gluconate 50 mg tablet Take 50 mg by mouth once daily.      [DISCONTINUED] acebutoloL (SECTRAL) 200 MG capsule Take 1 capsule (200 mg total) by mouth 2 (two) times daily. 180 capsule 3    [DISCONTINUED] amLODIPine (NORVASC) 5 MG tablet Take 1 tablet (5 mg total) by mouth every morning. 90 tablet 3    [DISCONTINUED] doxazosin (CARDURA) 1 MG tablet Take 1 tablet (1 mg total) by mouth nightly. 90 tablet 1    [DISCONTINUED] DUREZOL 0.05 % Drop ophthalmic solution One (1) drop four (4) times a day in right eye only. Start 3 (three) days prior to surgery.      [DISCONTINUED] gabapentin (NEURONTIN) 300 MG capsule Take 300 mg by mouth nightly.      [DISCONTINUED] ketorolac 0.5% (ACULAR) 0.5 % Drop One (1) drop four (4) times a day in right eye only. Start 3 (three) days prior to surgery.      [DISCONTINUED] LORazepam (ATIVAN) 2 MG Tab Take 2 mg by mouth nightly.      [DISCONTINUED] omeprazole (PRILOSEC) 20 MG capsule Take 1 capsule (20 mg total) by mouth once daily. 90 capsule 3    [DISCONTINUED] oxyCODONE (ROXICODONE) 5 MG immediate release tablet Take 5 mg by mouth every 6 (six) hours.      [DISCONTINUED] traMADoL (ULTRAM) 50 mg tablet Take  "50 mg by mouth every 6 (six) hours as needed.      [DISCONTINUED] triazolam (HALCION) 0.25 MG Tab        No facility-administered encounter medications on file as of 3/14/2022.       Allergies:  Review of patient's allergies indicates:   Allergen Reactions    Lamisil [terbinafine] Hives       Health Maintenance:  Immunization History   Administered Date(s) Administered    COVID-19, MRNA, LN-S, PF (Pfizer) (Purple Cap) 07/22/2021, 08/12/2021      Health Maintenance   Topic Date Due    Hepatitis C Screening  Never done    TETANUS VACCINE  Never done    Lipid Panel  03/11/2027        Physical Exam      Vital Signs  Pulse: (!) 57  SpO2: 98 %  BP: 118/70  BP Location: Right arm  Patient Position: Sitting  Pain Score: 0-No pain  Height and Weight  Height: 5' 9" (175.3 cm)  Weight: 86.4 kg (190 lb 9.4 oz)  BSA (Calculated - sq m): 2.05 sq meters  BMI (Calculated): 28.1  Weight in (lb) to have BMI = 25: 168.9]    Physical Exam  Vitals reviewed.   Constitutional:       Appearance: He is well-developed.   HENT:      Head: Normocephalic and atraumatic.      Right Ear: External ear normal.      Left Ear: External ear normal.   Cardiovascular:      Rate and Rhythm: Normal rate and regular rhythm.      Heart sounds: Normal heart sounds. No murmur heard.  Pulmonary:      Effort: Pulmonary effort is normal.      Breath sounds: Normal breath sounds. No wheezing or rales.   Abdominal:      General: Bowel sounds are normal.      Palpations: Abdomen is soft.          Laboratory:  CBC:  Recent Labs   Lab 09/23/19  0835   WBC 6.25   RBC 4.74   Hemoglobin 14.1   Hematocrit 44.3   Platelets 167   MCV 94   MCH 29.7   MCHC 31.8 L     CMP:  Recent Labs   Lab 09/23/19  0835 05/29/20  1304 03/11/22  1107   Glucose 110 95 89   Calcium 9.5 10.2 10.5   Albumin 4.2 4.4 4.2   Total Protein 6.8 7.2 7.0   Sodium 141 139 140   Potassium 4.2 4.5 4.3   CO2 24 24 25   Chloride 110 107 105   BUN 17 16 18   Alkaline Phosphatase 83 78 80   ALT 15 17 15 "   AST 23 17 21   Total Bilirubin 0.4 0.5 0.6     URINALYSIS:  Recent Labs   Lab 05/21/19  1403   Color, UA yel   Spec Grav UA 1.020   pH, UA 5   Nitrite, UA n   Urobilinogen, UA n      LIPIDS:  Recent Labs   Lab 09/23/19  0835 11/11/19  0000 03/11/22  1107   TSH 2.575  --   --    HDL 51 50 57   Cholesterol 148 156 136   Triglycerides 101  --  71   LDL Cholesterol 76.8 73 64.8   HDL/Cholesterol Ratio 34.5  --  41.9   Non-HDL Cholesterol 97  --  79   Total Cholesterol/HDL Ratio 2.9  --  2.4     TSH:  Recent Labs   Lab 09/23/19  0835   TSH 2.575     A1C:        Assessment/Plan     Giovanni Moncada is a 66 y.o.male with:    1. Essential hypertension  - doxazosin (CARDURA) 1 MG tablet; Take 1 tablet (1 mg total) by mouth nightly.  Dispense: 90 tablet; Refill: 3  - amLODIPine (NORVASC) 5 MG tablet; Take 1 tablet (5 mg total) by mouth every morning.  Dispense: 90 tablet; Refill: 3  Continue current meds.    2. GERD without esophagitis  - omeprazole (PRILOSEC) 20 MG capsule; Take 1 capsule (20 mg total) by mouth once daily.  Dispense: 90 capsule; Refill: 3  Continue current meds.    3. Hyperlipidemia, mixed  Continue current meds.    4. Benign prostatic hyperplasia without lower urinary tract symptoms  Continue current meds.    5. Chronic neck pain  Continue current meds.    6. Erectile dysfunction, unspecified erectile dysfunction type    7. Cardiac arrhythmia, unspecified cardiac arrhythmia type  - acebutoloL (SECTRAL) 200 MG capsule; Take 1 capsule (200 mg total) by mouth 2 (two) times daily.  Dispense: 180 capsule; Refill: 3       Chronic conditions status updated as per HPI.  Other than changes above, cont current medications and maintain follow up with specialists.  No follow-ups on file.    Future Appointments   Date Time Provider Department Center   4/8/2022  9:00 AM Arnav Aquino MD [facility] UROLOGY [facility]   9/15/2022  9:30 AM Arnel Flaherty MD [facility]       Arnel Flaherty MD  [facility]                  Answers for HPI/ROS submitted by the patient on 3/13/2022  activity change: No  unexpected weight change: No  rhinorrhea: No  trouble swallowing: No  visual disturbance: No  chest tightness: No  polyuria: Yes  difficulty urinating: No  joint swelling: No  arthralgias: No  confusion: No  dysphoric mood: No

## 2022-03-14 NOTE — PATIENT INSTRUCTIONS
Blood pressure low end of normal.  Talk to Dr. Rogers about whether he agrees with stopping amlodipine on a trial basis.

## 2022-03-15 LAB — HIV 1+2 AB+HIV1 P24 AG SERPL QL IA: NEGATIVE

## 2022-05-19 ENCOUNTER — PATIENT OUTREACH (OUTPATIENT)
Dept: ADMINISTRATIVE | Facility: OTHER | Age: 67
End: 2022-05-19
Payer: MEDICARE

## 2022-05-19 NOTE — PROGRESS NOTES
Subjective:      Giovanni Moncada is a 66 y.o. male who returns today regarding his     ED    viagra works but wants to try cialis for spontaneity.    The following portions of the patient's history were reviewed and updated as appropriate: allergies, current medications, past family history, past medical history, past social history, past surgical history and problem list.    Review of Systems  Pertinent items are noted in HPI.  A comprehensive multipoint review of systems was negative except as otherwise stated in the HPI.     Objective:   Vitals: There were no vitals taken for this visit.    Physical Exam   General: alert and oriented, no acute distress  Respiratory: Symmetric expansion, non-labored breathing  Cardiovascular: normal to inspection  Abdomen: non distended   Skin: normal coloration and turgor, no rashes, no suspicious skin lesions noted  Neuro: no gross deficits  Psych: normal judgment and insight, normal mood/affect and non-anxious  ES 40g no nodules  Physical Exam    Lab Review   Urinalysis demonstrates no specimen    Lab Results   Component Value Date    WBC 6.25 09/23/2019    HGB 14.1 09/23/2019    HCT 44.3 09/23/2019    MCV 94 09/23/2019     09/23/2019     Lab Results   Component Value Date    CREATININE 0.8 03/11/2022    BUN 18 03/11/2022     Lab Results   Component Value Date    PSADIAG 2.1 03/11/2022    PSADIAG 0.96 07/06/2020    PSADIAG 1.5 07/16/2018         Imaging  -  Assessment and Plan:   Enlarged prostate without lower urinary tract symptoms (luts)    Erectile dysfunction, unspecified erectile dysfunction type  DC viagra  cialis 20mg trial    See PCP and cards re BP    rtc 1 yr with psa

## 2022-05-20 ENCOUNTER — OFFICE VISIT (OUTPATIENT)
Dept: UROLOGY | Facility: CLINIC | Age: 67
End: 2022-05-20
Payer: MEDICARE

## 2022-05-20 VITALS
HEART RATE: 57 BPM | DIASTOLIC BLOOD PRESSURE: 87 MMHG | HEIGHT: 69 IN | SYSTOLIC BLOOD PRESSURE: 131 MMHG | WEIGHT: 190.5 LBS | BODY MASS INDEX: 28.22 KG/M2

## 2022-05-20 DIAGNOSIS — N40.0 ENLARGED PROSTATE WITHOUT LOWER URINARY TRACT SYMPTOMS (LUTS): Primary | ICD-10-CM

## 2022-05-20 DIAGNOSIS — N52.9 ERECTILE DYSFUNCTION, UNSPECIFIED ERECTILE DYSFUNCTION TYPE: ICD-10-CM

## 2022-05-20 LAB
BILIRUB UR QL STRIP: NEGATIVE
GLUCOSE UR QL STRIP: NEGATIVE
KETONES UR QL STRIP: NEGATIVE
LEUKOCYTE ESTERASE UR QL STRIP: NEGATIVE
PH, POC UA: 5
POC BLOOD, URINE: NEGATIVE
POC NITRATES, URINE: NEGATIVE
PROT UR QL STRIP: POSITIVE
SP GR UR STRIP: 1.02 (ref 1–1.03)
UROBILINOGEN UR STRIP-ACNC: NEGATIVE (ref 0.3–2.2)

## 2022-05-20 PROCEDURE — 99214 PR OFFICE/OUTPT VISIT, EST, LEVL IV, 30-39 MIN: ICD-10-PCS | Mod: S$GLB,,, | Performed by: UROLOGY

## 2022-05-20 PROCEDURE — 99214 OFFICE O/P EST MOD 30 MIN: CPT | Mod: S$GLB,,, | Performed by: UROLOGY

## 2022-05-20 RX ORDER — TADALAFIL 20 MG/1
20 TABLET ORAL DAILY PRN
Qty: 36 TABLET | Refills: 3 | Status: SHIPPED | OUTPATIENT
Start: 2022-05-20 | End: 2022-12-09 | Stop reason: SDUPTHER

## 2022-09-26 ENCOUNTER — TELEPHONE (OUTPATIENT)
Dept: INTERNAL MEDICINE | Facility: CLINIC | Age: 67
End: 2022-09-26
Payer: MEDICARE

## 2022-09-26 DIAGNOSIS — E78.2 HYPERLIPIDEMIA, MIXED: Primary | ICD-10-CM

## 2022-09-26 NOTE — TELEPHONE ENCOUNTER
----- Message from Emilie Lei sent at 9/26/2022 12:00 PM CDT -----  Contact: MARCOS WORKMNA [[id number]]@  [phone]  Consuelo (wife) wanted to know if patient need to have any labs prior to his 6 mo f/u on  9/29.

## 2022-09-28 ENCOUNTER — LAB VISIT (OUTPATIENT)
Dept: LAB | Facility: HOSPITAL | Age: 67
End: 2022-09-28
Attending: INTERNAL MEDICINE
Payer: MEDICARE

## 2022-09-28 DIAGNOSIS — E78.2 HYPERLIPIDEMIA, MIXED: ICD-10-CM

## 2022-09-28 LAB
ALBUMIN SERPL BCP-MCNC: 4.4 G/DL (ref 3.5–5.2)
ALP SERPL-CCNC: 78 U/L (ref 55–135)
ALT SERPL W/O P-5'-P-CCNC: 18 U/L (ref 10–44)
ANION GAP SERPL CALC-SCNC: 7 MMOL/L (ref 8–16)
AST SERPL-CCNC: 20 U/L (ref 10–40)
BILIRUB SERPL-MCNC: 0.6 MG/DL (ref 0.1–1)
BUN SERPL-MCNC: 16 MG/DL (ref 8–23)
CALCIUM SERPL-MCNC: 10.1 MG/DL (ref 8.7–10.5)
CHLORIDE SERPL-SCNC: 105 MMOL/L (ref 95–110)
CHOLEST SERPL-MCNC: 137 MG/DL (ref 120–199)
CHOLEST/HDLC SERPL: 2.9 {RATIO} (ref 2–5)
CO2 SERPL-SCNC: 25 MMOL/L (ref 23–29)
CREAT SERPL-MCNC: 0.8 MG/DL (ref 0.5–1.4)
EST. GFR  (NO RACE VARIABLE): >60 ML/MIN/1.73 M^2
GLUCOSE SERPL-MCNC: 98 MG/DL (ref 70–110)
HDLC SERPL-MCNC: 48 MG/DL (ref 40–75)
HDLC SERPL: 35 % (ref 20–50)
LDLC SERPL CALC-MCNC: 64.6 MG/DL (ref 63–159)
NONHDLC SERPL-MCNC: 89 MG/DL
POTASSIUM SERPL-SCNC: 4.1 MMOL/L (ref 3.5–5.1)
PROT SERPL-MCNC: 6.6 G/DL (ref 6–8.4)
SODIUM SERPL-SCNC: 137 MMOL/L (ref 136–145)
TRIGL SERPL-MCNC: 122 MG/DL (ref 30–150)

## 2022-09-28 PROCEDURE — 36415 COLL VENOUS BLD VENIPUNCTURE: CPT | Mod: PO | Performed by: INTERNAL MEDICINE

## 2022-09-28 PROCEDURE — 80053 COMPREHEN METABOLIC PANEL: CPT | Performed by: INTERNAL MEDICINE

## 2022-09-28 PROCEDURE — 80061 LIPID PANEL: CPT | Performed by: INTERNAL MEDICINE

## 2022-09-29 ENCOUNTER — OFFICE VISIT (OUTPATIENT)
Dept: INTERNAL MEDICINE | Facility: CLINIC | Age: 67
End: 2022-09-29
Payer: MEDICARE

## 2022-09-29 VITALS
SYSTOLIC BLOOD PRESSURE: 130 MMHG | WEIGHT: 191.38 LBS | BODY MASS INDEX: 28.35 KG/M2 | OXYGEN SATURATION: 97 % | TEMPERATURE: 98 F | HEIGHT: 69 IN | DIASTOLIC BLOOD PRESSURE: 80 MMHG | HEART RATE: 52 BPM

## 2022-09-29 DIAGNOSIS — E78.2 HYPERLIPIDEMIA, MIXED: ICD-10-CM

## 2022-09-29 DIAGNOSIS — K21.9 GERD WITHOUT ESOPHAGITIS: ICD-10-CM

## 2022-09-29 DIAGNOSIS — G89.29 CHRONIC NECK PAIN: ICD-10-CM

## 2022-09-29 DIAGNOSIS — I10 ESSENTIAL HYPERTENSION: Primary | ICD-10-CM

## 2022-09-29 DIAGNOSIS — N52.9 ERECTILE DYSFUNCTION, UNSPECIFIED ERECTILE DYSFUNCTION TYPE: ICD-10-CM

## 2022-09-29 DIAGNOSIS — M54.2 CHRONIC NECK PAIN: ICD-10-CM

## 2022-09-29 DIAGNOSIS — N40.0 BENIGN PROSTATIC HYPERPLASIA WITHOUT LOWER URINARY TRACT SYMPTOMS: ICD-10-CM

## 2022-09-29 DIAGNOSIS — Z12.5 SCREENING FOR MALIGNANT NEOPLASM OF PROSTATE: ICD-10-CM

## 2022-09-29 PROCEDURE — 99999 PR PBB SHADOW E&M-EST. PATIENT-LVL V: CPT | Mod: PBBFAC,,, | Performed by: INTERNAL MEDICINE

## 2022-09-29 PROCEDURE — 99215 OFFICE O/P EST HI 40 MIN: CPT | Mod: PBBFAC | Performed by: INTERNAL MEDICINE

## 2022-09-29 PROCEDURE — 99214 OFFICE O/P EST MOD 30 MIN: CPT | Mod: S$PBB,,, | Performed by: INTERNAL MEDICINE

## 2022-09-29 PROCEDURE — 99214 PR OFFICE/OUTPT VISIT, EST, LEVL IV, 30-39 MIN: ICD-10-PCS | Mod: S$PBB,,, | Performed by: INTERNAL MEDICINE

## 2022-09-29 PROCEDURE — 99999 PR PBB SHADOW E&M-EST. PATIENT-LVL V: ICD-10-PCS | Mod: PBBFAC,,, | Performed by: INTERNAL MEDICINE

## 2022-09-29 NOTE — PROGRESS NOTES
[facility] Note    Chief Complaint      Chief Complaint   Patient presents with    Hypertension     6 month f/u         History of Present Illness      Giovanni Moncada is a 67 y.o. male with chronic conditions of HTN, HLD, BPH, GERD, ED, hx of squamous cell carcinoma of neck who presents today for: follow up chronic conditions.  Pt interested in Galleri test.    HTN: BP at goal on benicar, amlodipine, acebutolol, doxazosin.  HLD: controlled on atorvastatin.  LDL 64.  Sees Dr. Rogers.  ED: Sees Dr. Aquino, urology.  On viagra  GERD:  Sees Dr. Garcia. Controlled on omeprazole.  No new or worsening symptoms.   Cardiac arrhythmia: Sees Dr. Rogers.  Previous diagnosed during stress test.  On acebutolol for rate control.  BPH: Sees Dr. Aquino.  Controlled on doxazosin.  No new or worsening symptoms  Hx of squamous cell carcinoma neck s/p resection: no new changes.  Flu shot declines.  Td 2005.  Pneumonia vaccines declines.  Shingles vaccine declines.  COVID vaccine UTD.  Cscope 2021, Dr. Mendes, polyps, 5 yr interval.    Past Medical History:  Past Medical History:   Diagnosis Date    Hypercholesteremia     Hypertension        Past Surgical History:   has a past surgical history that includes Neck surgery; Cataract extraction, bilateral (02/2020);  colonoscopy (01/07/2016); colonoscopy (01/26/2021); Eye surgery (2020); Hernia repair (2020); Tonsillectomy (1964); and Cholecystectomy (2020).    Family History:  family history includes Cancer in his mother; Heart disease in his father.     Social History:  Social History     Tobacco Use    Smoking status: Never    Smokeless tobacco: Never   Substance Use Topics    Alcohol use: Yes     Alcohol/week: 8.0 standard drinks     Types: 4 Glasses of wine, 4 Drinks containing 0.5 oz of alcohol per week    Drug use: No       I personally reviewed all past medical, surgical, social and family history.    Review of Systems   Constitutional:  Negative for chills,  fever and malaise/fatigue.   Respiratory:  Negative for shortness of breath.    Cardiovascular:  Negative for chest pain.   Gastrointestinal:  Negative for constipation, diarrhea, nausea and vomiting.   Skin:  Negative for rash.   Neurological:  Negative for weakness.   All other systems reviewed and are negative.     Medications:  Outpatient Encounter Medications as of 9/29/2022   Medication Sig Dispense Refill    a-cysteine/arg zinc/glut/mv-mn (L GLUTAMINE-N ACET-ARG-VIT-MIN ORAL) Take by mouth.      acebutoloL (SECTRAL) 200 MG capsule Take 1 capsule (200 mg total) by mouth 2 (two) times daily. 180 capsule 3    amLODIPine (NORVASC) 5 MG tablet Take 1 tablet (5 mg total) by mouth every morning. 90 tablet 3    ascorbic acid, vitamin C, (VITAMIN C) 1000 MG tablet Take 1,000 mg by mouth.      aspirin (ECOTRIN) 81 MG EC tablet Take 81 mg by mouth 2 (two) times daily.      atorvastatin (LIPITOR) 10 MG tablet Take 1 tablet (10 mg total) by mouth nightly. 90 tablet 3    bacillus coagulans-inulin 1 billion-250 cell-mg Cap Take by mouth.      cholecalciferol, vitamin D3, 5,000 unit Tab Take by mouth.      coenzyme Q10 100 mg capsule Take 100 mg by mouth once daily.      cyanocobalamin (VITAMIN B-12) 1000 MCG tablet Take 100 mcg by mouth once daily.      doxazosin (CARDURA) 1 MG tablet Take 1 tablet (1 mg total) by mouth nightly. 90 tablet 3    ergocalciferol (ERGOCALCIFEROL) 50,000 unit Cap Take 50,000 Units by mouth every 7 days.      fluticasone propionate (FLONASE) 50 mcg/actuation nasal spray 1 spray (50 mcg total) by Each Nostril route once daily. 16 g 11    glucosamine-chondroit-vit C-Mn 500-400 mg capsule Take by mouth.      GLUCOSAMINE/CHONDR MARI A SOD (OSTEO BI-FLEX ORAL) Take by mouth.      olmesartan (BENICAR) 20 MG tablet Take 1 tablet (20 mg total) by mouth every evening. 90 tablet 3    omega-3 acid ethyl esters (LOVAZA) 1 gram capsule Take 2 capsules (2 g total) by mouth 2 (two) times daily. 360 capsule 0     "omeprazole (PRILOSEC) 20 MG capsule Take 1 capsule (20 mg total) by mouth once daily. 90 capsule 3    tadalafiL (CIALIS) 20 MG Tab Take 1 tablet (20 mg total) by mouth daily as needed (ED). 36 tablet 3    vitamin A acetate 10,000 unit Subl Place 2,400 mcg under the tongue.      zinc acetate 50 mg (zinc) Cap Take 50 mg by mouth.      zinc gluconate 50 mg tablet Take 50 mg by mouth once daily.       No facility-administered encounter medications on file as of 9/29/2022.       Allergies:  Review of patient's allergies indicates:   Allergen Reactions    Lamisil [terbinafine] Hives       Health Maintenance:  Immunization History   Administered Date(s) Administered    COVID-19, MRNA, LN-S, PF (Pfizer) (Purple Cap) 07/22/2021, 08/12/2021      Health Maintenance   Topic Date Due    Hepatitis C Screening  Never done    TETANUS VACCINE  Never done    Lipid Panel  09/28/2027        Physical Exam      Vital Signs  Temp: 98 °F (36.7 °C)  Temp src: Oral  Pulse: (!) 52  SpO2: 97 %  BP: 130/80  BP Location: Right arm  Patient Position: Sitting  Pain Score: 0-No pain  Height and Weight  Height: 5' 9" (175.3 cm)  Weight: 86.8 kg (191 lb 5.8 oz)  BSA (Calculated - sq m): 2.06 sq meters  BMI (Calculated): 28.2  Weight in (lb) to have BMI = 25: 168.9]    Physical Exam  Vitals reviewed.   Constitutional:       Appearance: He is well-developed.   HENT:      Head: Normocephalic and atraumatic.      Right Ear: External ear normal.      Left Ear: External ear normal.   Cardiovascular:      Rate and Rhythm: Normal rate and regular rhythm.      Heart sounds: Normal heart sounds. No murmur heard.  Pulmonary:      Effort: Pulmonary effort is normal.      Breath sounds: Normal breath sounds. No wheezing or rales.   Abdominal:      General: Bowel sounds are normal.      Palpations: Abdomen is soft.        Laboratory:  CBC:      CMP:  Recent Labs   Lab 05/29/20  1304 03/11/22  1107 09/28/22  0945   Glucose 95 89 98   Calcium 10.2 10.5 10.1 "   Albumin 4.4 4.2 4.4   Total Protein 7.2 7.0 6.6   Sodium 139 140 137   Potassium 4.5 4.3 4.1   CO2 24 25 25   Chloride 107 105 105   BUN 16 18 16   Alkaline Phosphatase 78 80 78   ALT 17 15 18   AST 17 21 20   Total Bilirubin 0.5 0.6 0.6     URINALYSIS:  Recent Labs   Lab 05/20/22  1010   pH, UA 5      LIPIDS:  Recent Labs   Lab 11/11/19  0000 03/11/22  1107 09/28/22  0945   HDL 50 57 48   Cholesterol 156 136 137   Triglycerides  --  71 122   LDL Cholesterol 73 64.8 64.6   HDL/Cholesterol Ratio  --  41.9 35.0   Non-HDL Cholesterol  --  79 89   Total Cholesterol/HDL Ratio  --  2.4 2.9     TSH:      A1C:        Assessment/Plan     Giovanni Moncada is a 67 y.o.male with:    1. Essential hypertension  Continue current meds.    2. Hyperlipidemia, mixed  - Comprehensive Metabolic Panel; Future  - Lipid Panel; Future  Continue current meds.    3. GERD without esophagitis  Continue current meds.    4. Benign prostatic hyperplasia without lower urinary tract symptoms  5. Erectile dysfunction, unspecified erectile dysfunction type  Continue current meds.    6. Chronic neck pain  Continue current meds as needed.  7. Screening for malignant neoplasm of prostate  - PSA, Screening; Future     Chronic conditions status updated as per HPI.  Other than changes above, cont current medications and maintain follow up with specialists.  No follow-ups on file.    No future appointments.      Arnel Flaherty MD  [facility]                     yes

## 2022-10-03 ENCOUNTER — TELEPHONE (OUTPATIENT)
Dept: HEMATOLOGY/ONCOLOGY | Facility: CLINIC | Age: 67
End: 2022-10-03
Payer: MEDICARE

## 2022-10-24 ENCOUNTER — TELEPHONE (OUTPATIENT)
Dept: HEMATOLOGY/ONCOLOGY | Facility: CLINIC | Age: 67
End: 2022-10-24
Payer: MEDICARE

## 2022-10-24 NOTE — TELEPHONE ENCOUNTER
Attempted to call pt about Grail Galleri test. No answer. Left message with call back number [phone].

## 2022-10-25 ENCOUNTER — TELEPHONE (OUTPATIENT)
Dept: HEMATOLOGY/ONCOLOGY | Facility: CLINIC | Age: 67
End: 2022-10-25
Payer: MEDICARE

## 2022-10-25 DIAGNOSIS — Z12.9 CANCER SCREENING: Primary | ICD-10-CM

## 2022-10-25 NOTE — TELEPHONE ENCOUNTER
Pt's wife called to set up the Grail Galleri test for both her and patient Mr. Sb Moncada.  The following education was provided that she assured nurse she would share with the patient:    The test will detect if there is a cancer signal at the time of the lab draw.  The test results will say either cancer signal detected or cancer signal not detected.   If a cancer signal is detected, then the patient will be referred to a medical oncologist to begin a work-up starting with imaging to detect the cancer. If a cancer signal is detected, but imaging is unable to visualize the cancer, then [facility] will re-test the patient at no cost within 3-6 months.   If a cancer signal is not detected, there is no further work-up needed at this time.  This test is not meant to replace any cancer screenings based on their personal and family history of cancer.  The patient can have this done more than once. [facility] currently recommends once a year.  The test results will take around 10 business days to get the results and the patient will be called or sent a [employer] message with their results.   The lab appt is made for Thurs, Oct 26 @ 10am at 2nd floor lab [facility].  All questions/concerns were answered to the patient's satisfaction.

## 2022-10-27 ENCOUNTER — LAB VISIT (OUTPATIENT)
Dept: LAB | Facility: HOSPITAL | Age: 67
End: 2022-10-27
Attending: INTERNAL MEDICINE

## 2022-10-27 DIAGNOSIS — Z12.9 CANCER SCREENING: ICD-10-CM

## 2022-10-27 PROCEDURE — 81479 UNLISTED MOLECULAR PATHOLOGY: CPT | Mod: WS,SP | Performed by: INTERNAL MEDICINE

## 2022-10-27 PROCEDURE — 36415 COLL VENOUS BLD VENIPUNCTURE: CPT | Mod: SP | Performed by: INTERNAL MEDICINE

## 2022-11-08 LAB — GALLERI NO PREDICTED SIGNAL ORIGIN: NORMAL

## 2022-11-09 ENCOUNTER — PATIENT MESSAGE (OUTPATIENT)
Dept: HEMATOLOGY/ONCOLOGY | Facility: CLINIC | Age: 67
End: 2022-11-09
Payer: MEDICARE

## 2023-01-26 ENCOUNTER — OFFICE VISIT (OUTPATIENT)
Dept: INTERNAL MEDICINE | Facility: CLINIC | Age: 68
End: 2023-01-26
Payer: MEDICARE

## 2023-01-26 VITALS
DIASTOLIC BLOOD PRESSURE: 80 MMHG | BODY MASS INDEX: 28.52 KG/M2 | OXYGEN SATURATION: 97 % | HEART RATE: 53 BPM | HEIGHT: 69 IN | SYSTOLIC BLOOD PRESSURE: 120 MMHG | WEIGHT: 192.56 LBS

## 2023-01-26 DIAGNOSIS — N52.9 ERECTILE DYSFUNCTION, UNSPECIFIED ERECTILE DYSFUNCTION TYPE: ICD-10-CM

## 2023-01-26 DIAGNOSIS — K21.9 GERD WITHOUT ESOPHAGITIS: ICD-10-CM

## 2023-01-26 DIAGNOSIS — N40.0 BENIGN PROSTATIC HYPERPLASIA WITHOUT LOWER URINARY TRACT SYMPTOMS: ICD-10-CM

## 2023-01-26 DIAGNOSIS — I49.5 SICK SINUS SYNDROME: ICD-10-CM

## 2023-01-26 DIAGNOSIS — R00.2 PALPITATIONS: ICD-10-CM

## 2023-01-26 DIAGNOSIS — E78.2 HYPERLIPIDEMIA, MIXED: ICD-10-CM

## 2023-01-26 DIAGNOSIS — R10.9 LEFT FLANK PAIN: ICD-10-CM

## 2023-01-26 DIAGNOSIS — I10 ESSENTIAL HYPERTENSION: ICD-10-CM

## 2023-01-26 DIAGNOSIS — Z00.00 ANNUAL PHYSICAL EXAM: Primary | ICD-10-CM

## 2023-01-26 DIAGNOSIS — M54.2 CHRONIC NECK PAIN: ICD-10-CM

## 2023-01-26 DIAGNOSIS — G89.29 CHRONIC NECK PAIN: ICD-10-CM

## 2023-01-26 DIAGNOSIS — C77.0 SECONDARY AND UNSPECIFIED MALIGNANT NEOPLASM OF LYMPH NODES OF HEAD, FACE AND NECK: ICD-10-CM

## 2023-01-26 PROCEDURE — 99214 PR OFFICE/OUTPT VISIT, EST, LEVL IV, 30-39 MIN: ICD-10-PCS | Mod: S$GLB,,, | Performed by: INTERNAL MEDICINE

## 2023-01-26 PROCEDURE — 3079F PR MOST RECENT DIASTOLIC BLOOD PRESSURE 80-89 MM HG: ICD-10-PCS | Mod: CPTII,S$GLB,, | Performed by: INTERNAL MEDICINE

## 2023-01-26 PROCEDURE — 1159F PR MEDICATION LIST DOCUMENTED IN MEDICAL RECORD: ICD-10-PCS | Mod: CPTII,S$GLB,, | Performed by: INTERNAL MEDICINE

## 2023-01-26 PROCEDURE — 1126F PR PAIN SEVERITY QUANTIFIED, NO PAIN PRESENT: ICD-10-PCS | Mod: CPTII,S$GLB,, | Performed by: INTERNAL MEDICINE

## 2023-01-26 PROCEDURE — 1159F MED LIST DOCD IN RCRD: CPT | Mod: CPTII,S$GLB,, | Performed by: INTERNAL MEDICINE

## 2023-01-26 PROCEDURE — 3008F BODY MASS INDEX DOCD: CPT | Mod: CPTII,S$GLB,, | Performed by: INTERNAL MEDICINE

## 2023-01-26 PROCEDURE — 1126F AMNT PAIN NOTED NONE PRSNT: CPT | Mod: CPTII,S$GLB,, | Performed by: INTERNAL MEDICINE

## 2023-01-26 PROCEDURE — 99999 PR PBB SHADOW E&M-EST. PATIENT-LVL V: ICD-10-PCS | Mod: PBBFAC,,, | Performed by: INTERNAL MEDICINE

## 2023-01-26 PROCEDURE — 3074F SYST BP LT 130 MM HG: CPT | Mod: CPTII,S$GLB,, | Performed by: INTERNAL MEDICINE

## 2023-01-26 PROCEDURE — 3074F PR MOST RECENT SYSTOLIC BLOOD PRESSURE < 130 MM HG: ICD-10-PCS | Mod: CPTII,S$GLB,, | Performed by: INTERNAL MEDICINE

## 2023-01-26 PROCEDURE — 3008F PR BODY MASS INDEX (BMI) DOCUMENTED: ICD-10-PCS | Mod: CPTII,S$GLB,, | Performed by: INTERNAL MEDICINE

## 2023-01-26 PROCEDURE — 99214 OFFICE O/P EST MOD 30 MIN: CPT | Mod: S$GLB,,, | Performed by: INTERNAL MEDICINE

## 2023-01-26 PROCEDURE — 3079F DIAST BP 80-89 MM HG: CPT | Mod: CPTII,S$GLB,, | Performed by: INTERNAL MEDICINE

## 2023-01-26 PROCEDURE — 99999 PR PBB SHADOW E&M-EST. PATIENT-LVL V: CPT | Mod: PBBFAC,,, | Performed by: INTERNAL MEDICINE

## 2023-01-26 NOTE — PROGRESS NOTES
[facility] Note    Chief Complaint      Chief Complaint   Patient presents with    Annual Exam       History of Present Illness      Giovanni Moncada is a 67 y.o. male with chronic conditions of HTN, HLD, BPH, GERD, ED, hx of squamous cell carcinoma of neck who presents today for: follow up chronic conditions.  Had episode of left flank pain  HTN: BP at goal on benicar, amlodipine, acebutolol, doxazosin.  HLD: controlled on atorvastatin.  LDL 64.  Sees Dr. Rogers.  ED: Sees Dr. Aquino, urology.  On viagra  GERD:  Sees Dr. Garcia. Controlled on omeprazole.  No new or worsening symptoms.   Cardiac arrhythmia: Sees Dr. Rogers.  Previous diagnosed during stress test.  On acebutolol for rate control.  BPH: Sees Dr. Aquino.  Controlled on doxazosin.  No new or worsening symptoms  Hx of squamous cell carcinoma neck s/p resection: no new changes.  Flu shot declines.  Td 2005.  Pneumonia vaccines declines.  Shingles vaccine declines.  COVID vaccine UTD.  Cscope 2021, Dr. Mendes, polyps, 5 yr interval.    Past Medical History:  Past Medical History:   Diagnosis Date    Hypercholesteremia     Hypertension        Past Surgical History:   has a past surgical history that includes Neck surgery; Cataract extraction, bilateral (02/2020);  colonoscopy (01/07/2016); colonoscopy (01/26/2021); Eye surgery (2020); Hernia repair (2020); Tonsillectomy (1964); and Cholecystectomy (2020).    Family History:  family history includes Cancer in his mother; Heart disease in his father.     Social History:  Social History     Tobacco Use    Smoking status: Never    Smokeless tobacco: Never   Substance Use Topics    Alcohol use: Yes     Alcohol/week: 8.0 standard drinks     Types: 4 Glasses of wine, 4 Drinks containing 0.5 oz of alcohol per week    Drug use: No       I personally reviewed all past medical, surgical, social and family history.    Review of Systems   Constitutional:  Negative for chills, fever and  malaise/fatigue.   Respiratory:  Negative for shortness of breath.    Cardiovascular:  Negative for chest pain.   Gastrointestinal:  Negative for constipation, diarrhea, nausea and vomiting.   Skin:  Negative for rash.   Neurological:  Negative for weakness.   All other systems reviewed and are negative.     Medications:  Outpatient Encounter Medications as of 1/26/2023   Medication Sig Dispense Refill    a-cysteine/arg zinc/glut/mv-mn (L GLUTAMINE-N ACET-ARG-VIT-MIN ORAL) Take by mouth.      acebutoloL (SECTRAL) 200 MG capsule Take 1 capsule (200 mg total) by mouth 2 (two) times daily. 180 capsule 3    amLODIPine (NORVASC) 5 MG tablet Take 1 tablet (5 mg total) by mouth every morning. 90 tablet 3    ascorbic acid, vitamin C, (VITAMIN C) 1000 MG tablet Take 1,000 mg by mouth.      aspirin (ECOTRIN) 81 MG EC tablet Take 81 mg by mouth 2 (two) times daily.      atorvastatin (LIPITOR) 10 MG tablet Take 1 tablet (10 mg total) by mouth nightly. 90 tablet 3    bacillus coagulans-inulin 1 billion-250 cell-mg Cap Take by mouth.      cholecalciferol, vitamin D3, 5,000 unit Tab Take by mouth.      coenzyme Q10 100 mg capsule Take 100 mg by mouth once daily.      cyanocobalamin (VITAMIN B-12) 1000 MCG tablet Take 100 mcg by mouth once daily.      doxazosin (CARDURA) 1 MG tablet Take 1 tablet (1 mg total) by mouth nightly. 90 tablet 3    ergocalciferol (ERGOCALCIFEROL) 50,000 unit Cap Take 50,000 Units by mouth every 7 days.      fluticasone propionate (FLONASE) 50 mcg/actuation nasal spray 1 spray (50 mcg total) by Each Nostril route once daily. 48 g 3    glucosamine-chondroit-vit C-Mn 500-400 mg capsule Take by mouth.      GLUCOSAMINE/CHONDR MARI A SOD (OSTEO BI-FLEX ORAL) Take 1,500 mg by mouth.      olmesartan (BENICAR) 20 MG tablet Take 1 tablet (20 mg total) by mouth every evening. 90 tablet 3    omega-3 acid ethyl esters (LOVAZA) 1 gram capsule Take 2 capsules (2 g total) by mouth 2 (two) times daily. 360 capsule 3     "omeprazole (PRILOSEC) 20 MG capsule Take 1 capsule (20 mg total) by mouth once daily. 90 capsule 3    sildenafiL (VIAGRA) 100 MG tablet Take 1 tablet (100 mg total) by mouth daily as needed for Erectile Dysfunction. 30 tablet 11    vitamin A acetate 10,000 unit Subl Place 2,400 mcg under the tongue.      zinc acetate 50 mg (zinc) Cap Take 50 mg by mouth.      zinc gluconate 50 mg tablet Take 50 mg by mouth once daily.       No facility-administered encounter medications on file as of 1/26/2023.       Allergies:  Review of patient's allergies indicates:   Allergen Reactions    Lamisil [terbinafine] Hives       Health Maintenance:  Immunization History   Administered Date(s) Administered    COVID-19, MRNA, LN-S, PF (Pfizer) (Purple Cap) 07/22/2021, 08/12/2021      Health Maintenance   Topic Date Due    Hepatitis C Screening  Never done    TETANUS VACCINE  Never done    Lipid Panel  09/28/2027        Physical Exam      Vital Signs  Pulse: (!) 53  SpO2: 97 %  BP: 120/80  BP Location: Left arm  Patient Position: Sitting  Pain Score: 0-No pain  Height and Weight  Height: 5' 9" (175.3 cm)  Weight: 87.4 kg (192 lb 9.2 oz)  BSA (Calculated - sq m): 2.06 sq meters  BMI (Calculated): 28.4  Weight in (lb) to have BMI = 25: 168.9]    Physical Exam  Vitals reviewed.   Constitutional:       Appearance: He is well-developed.   HENT:      Head: Normocephalic and atraumatic.      Right Ear: External ear normal.      Left Ear: External ear normal.   Cardiovascular:      Rate and Rhythm: Normal rate and regular rhythm.      Heart sounds: Normal heart sounds. No murmur heard.  Pulmonary:      Effort: Pulmonary effort is normal.      Breath sounds: Normal breath sounds. No wheezing or rales.   Abdominal:      General: Bowel sounds are normal.      Palpations: Abdomen is soft.        Laboratory:  CBC:      CMP:  Recent Labs   Lab 05/29/20  1304 03/11/22  1107 09/28/22  0945   Glucose 95 89 98   Calcium 10.2 10.5 10.1   Albumin 4.4 4.2 4.4 "   Total Protein 7.2 7.0 6.6   Sodium 139 140 137   Potassium 4.5 4.3 4.1   CO2 24 25 25   Chloride 107 105 105   BUN 16 18 16   Alkaline Phosphatase 78 80 78   ALT 17 15 18   AST 17 21 20   Total Bilirubin 0.5 0.6 0.6     URINALYSIS:  Recent Labs   Lab 05/20/22  1010   pH, UA 5      LIPIDS:  Recent Labs   Lab 03/11/22  1107 09/28/22  0945   HDL 57 48   Cholesterol 136 137   Triglycerides 71 122   LDL Cholesterol 64.8 64.6   HDL/Cholesterol Ratio 41.9 35.0   Non-HDL Cholesterol 79 89   Total Cholesterol/HDL Ratio 2.4 2.9     TSH:      A1C:        Assessment/Plan     Giovanni Moncada is a 67 y.o.male with:    1. Annual physical exam  - Ambulatory referral/consult to Dermatology; Future  Discussed diet and exercise, vaccines and cancer screening, risk factors.  Screening labs ordered.     2. Essential hypertension  - Ambulatory referral/consult to Cardiology; Future  Continue current meds.    3. Hyperlipidemia, mixed  - Comprehensive Metabolic Panel; Future  - Lipid Panel; Future  - Ambulatory referral/consult to Cardiology; Future  Continue current meds.    4. Benign prostatic hyperplasia without lower urinary tract symptoms  Continue current meds.    5. Erectile dysfunction, unspecified erectile dysfunction type    6. GERD without esophagitis    7. Chronic neck pain    8. Palpitations  - Ambulatory referral/consult to Cardiology; Future    9. Left flank pain  - Urinalysis, Reflex to Urine Culture Urine, Clean Catch; Future  - US Retroperitoneal Complete (Kidney and; Future    10. Secondary and unspecified malignant neoplasm of lymph nodes of head, face and neck    11. Sick sinus syndrome       Chronic conditions status updated as per HPI.  Other than changes above, cont current medications and maintain follow up with specialists.  No follow-ups on file.    Future Appointments   Date Time Provider Department Center   2/1/2023  9:30 AM [facility], [facility] [place]   2/1/2023  3:00 PM [facility]  US1 [facility] [place]    3/31/2023  9:45 AM Arnel Flaheryt MD [facility]       Arnel Flaherty MD  [facility]

## 2023-02-01 ENCOUNTER — HOSPITAL ENCOUNTER (OUTPATIENT)
Dept: RADIOLOGY | Facility: HOSPITAL | Age: 68
Discharge: HOME | End: 2023-02-01
Attending: INTERNAL MEDICINE
Payer: MEDICARE

## 2023-02-01 ENCOUNTER — LAB VISIT (OUTPATIENT)
Dept: LAB | Facility: HOSPITAL | Age: 68
End: 2023-02-01
Attending: INTERNAL MEDICINE
Payer: MEDICARE

## 2023-02-01 DIAGNOSIS — E78.2 HYPERLIPIDEMIA, MIXED: ICD-10-CM

## 2023-02-01 DIAGNOSIS — Z12.5 SCREENING FOR MALIGNANT NEOPLASM OF PROSTATE: ICD-10-CM

## 2023-02-01 DIAGNOSIS — R10.9 LEFT FLANK PAIN: ICD-10-CM

## 2023-02-01 LAB
ALBUMIN SERPL BCP-MCNC: 4.3 G/DL (ref 3.5–5.2)
ALBUMIN SERPL BCP-MCNC: 4.3 G/DL (ref 3.5–5.2)
ALP SERPL-CCNC: 85 U/L (ref 55–135)
ALP SERPL-CCNC: 85 U/L (ref 55–135)
ALT SERPL W/O P-5'-P-CCNC: 17 U/L (ref 10–44)
ALT SERPL W/O P-5'-P-CCNC: 17 U/L (ref 10–44)
ANION GAP SERPL CALC-SCNC: 8 MMOL/L (ref 8–16)
ANION GAP SERPL CALC-SCNC: 8 MMOL/L (ref 8–16)
AST SERPL-CCNC: 22 U/L (ref 10–40)
AST SERPL-CCNC: 22 U/L (ref 10–40)
BILIRUB SERPL-MCNC: 0.7 MG/DL (ref 0.1–1)
BILIRUB SERPL-MCNC: 0.7 MG/DL (ref 0.1–1)
BILIRUB UR QL STRIP: NEGATIVE
BUN SERPL-MCNC: 14 MG/DL (ref 8–23)
BUN SERPL-MCNC: 14 MG/DL (ref 8–23)
CALCIUM SERPL-MCNC: 10 MG/DL (ref 8.7–10.5)
CALCIUM SERPL-MCNC: 10 MG/DL (ref 8.7–10.5)
CHLORIDE SERPL-SCNC: 106 MMOL/L (ref 95–110)
CHLORIDE SERPL-SCNC: 106 MMOL/L (ref 95–110)
CHOLEST SERPL-MCNC: 160 MG/DL (ref 120–199)
CHOLEST SERPL-MCNC: 160 MG/DL (ref 120–199)
CHOLEST/HDLC SERPL: 3.1 {RATIO} (ref 2–5)
CHOLEST/HDLC SERPL: 3.1 {RATIO} (ref 2–5)
CLARITY UR REFRACT.AUTO: CLEAR
CO2 SERPL-SCNC: 24 MMOL/L (ref 23–29)
CO2 SERPL-SCNC: 24 MMOL/L (ref 23–29)
COLOR UR AUTO: YELLOW
COMPLEXED PSA SERPL-MCNC: 1.1 NG/ML (ref 0–4)
CREAT SERPL-MCNC: 0.9 MG/DL (ref 0.5–1.4)
CREAT SERPL-MCNC: 0.9 MG/DL (ref 0.5–1.4)
EST. GFR  (NO RACE VARIABLE): >60 ML/MIN/1.73 M^2
EST. GFR  (NO RACE VARIABLE): >60 ML/MIN/1.73 M^2
GLUCOSE SERPL-MCNC: 102 MG/DL (ref 70–110)
GLUCOSE SERPL-MCNC: 102 MG/DL (ref 70–110)
GLUCOSE UR QL STRIP: NEGATIVE
HDLC SERPL-MCNC: 52 MG/DL (ref 40–75)
HDLC SERPL-MCNC: 52 MG/DL (ref 40–75)
HDLC SERPL: 32.5 % (ref 20–50)
HDLC SERPL: 32.5 % (ref 20–50)
HGB UR QL STRIP: NEGATIVE
KETONES UR QL STRIP: NEGATIVE
LDLC SERPL CALC-MCNC: 83.8 MG/DL (ref 63–159)
LDLC SERPL CALC-MCNC: 83.8 MG/DL (ref 63–159)
LEUKOCYTE ESTERASE UR QL STRIP: NEGATIVE
NITRITE UR QL STRIP: NEGATIVE
NONHDLC SERPL-MCNC: 108 MG/DL
NONHDLC SERPL-MCNC: 108 MG/DL
PH UR STRIP: 6 [PH] (ref 5–8)
POTASSIUM SERPL-SCNC: 4.4 MMOL/L (ref 3.5–5.1)
POTASSIUM SERPL-SCNC: 4.4 MMOL/L (ref 3.5–5.1)
PROT SERPL-MCNC: 7.2 G/DL (ref 6–8.4)
PROT SERPL-MCNC: 7.2 G/DL (ref 6–8.4)
PROT UR QL STRIP: NEGATIVE
SODIUM SERPL-SCNC: 138 MMOL/L (ref 136–145)
SODIUM SERPL-SCNC: 138 MMOL/L (ref 136–145)
SP GR UR STRIP: 1.02 (ref 1–1.03)
TRIGL SERPL-MCNC: 121 MG/DL (ref 30–150)
TRIGL SERPL-MCNC: 121 MG/DL (ref 30–150)
URN SPEC COLLECT METH UR: NORMAL

## 2023-02-01 PROCEDURE — 76770 US EXAM ABDO BACK WALL COMP: CPT | Mod: TC

## 2023-02-01 PROCEDURE — 81003 URINALYSIS AUTO W/O SCOPE: CPT | Performed by: INTERNAL MEDICINE

## 2023-02-01 PROCEDURE — 36415 COLL VENOUS BLD VENIPUNCTURE: CPT | Performed by: INTERNAL MEDICINE

## 2023-02-01 PROCEDURE — 84153 ASSAY OF PSA TOTAL: CPT | Performed by: INTERNAL MEDICINE

## 2023-02-01 PROCEDURE — 80053 COMPREHEN METABOLIC PANEL: CPT | Performed by: INTERNAL MEDICINE

## 2023-02-01 PROCEDURE — 80061 LIPID PANEL: CPT | Performed by: INTERNAL MEDICINE

## 2023-02-01 PROCEDURE — 76770 US EXAM ABDO BACK WALL COMP: CPT | Mod: 26,,, | Performed by: RADIOLOGY

## 2023-02-01 PROCEDURE — 76770 US RETROPERITONEAL COMPLETE: ICD-10-PCS | Mod: 26,,, | Performed by: RADIOLOGY

## 2023-02-02 ENCOUNTER — OFFICE VISIT (OUTPATIENT)
Dept: CARDIOLOGY | Facility: CLINIC | Age: 68
End: 2023-02-02
Payer: MEDICARE

## 2023-02-02 VITALS
WEIGHT: 191.13 LBS | HEIGHT: 69 IN | DIASTOLIC BLOOD PRESSURE: 72 MMHG | BODY MASS INDEX: 28.31 KG/M2 | HEART RATE: 56 BPM | SYSTOLIC BLOOD PRESSURE: 117 MMHG

## 2023-02-02 DIAGNOSIS — I10 ESSENTIAL HYPERTENSION: Primary | ICD-10-CM

## 2023-02-02 DIAGNOSIS — R00.2 PALPITATIONS: ICD-10-CM

## 2023-02-02 DIAGNOSIS — I49.5 SICK SINUS SYNDROME: ICD-10-CM

## 2023-02-02 DIAGNOSIS — Z92.3 S/P RADIATION THERAPY > 12 WKS AGO: ICD-10-CM

## 2023-02-02 DIAGNOSIS — E78.2 HYPERLIPIDEMIA, MIXED: ICD-10-CM

## 2023-02-02 DIAGNOSIS — Z13.6 ENCOUNTER FOR SCREENING FOR CARDIOVASCULAR DISORDERS: ICD-10-CM

## 2023-02-02 PROCEDURE — 1101F PR PT FALLS ASSESS DOC 0-1 FALLS W/OUT INJ PAST YR: ICD-10-PCS | Mod: CPTII,S$GLB,, | Performed by: INTERNAL MEDICINE

## 2023-02-02 PROCEDURE — 1126F PR PAIN SEVERITY QUANTIFIED, NO PAIN PRESENT: ICD-10-PCS | Mod: CPTII,S$GLB,, | Performed by: INTERNAL MEDICINE

## 2023-02-02 PROCEDURE — 1159F PR MEDICATION LIST DOCUMENTED IN MEDICAL RECORD: ICD-10-PCS | Mod: CPTII,S$GLB,, | Performed by: INTERNAL MEDICINE

## 2023-02-02 PROCEDURE — 3074F SYST BP LT 130 MM HG: CPT | Mod: CPTII,S$GLB,, | Performed by: INTERNAL MEDICINE

## 2023-02-02 PROCEDURE — 99204 PR OFFICE/OUTPT VISIT, NEW, LEVL IV, 45-59 MIN: ICD-10-PCS | Mod: 25,S$GLB,, | Performed by: INTERNAL MEDICINE

## 2023-02-02 PROCEDURE — 1159F MED LIST DOCD IN RCRD: CPT | Mod: CPTII,S$GLB,, | Performed by: INTERNAL MEDICINE

## 2023-02-02 PROCEDURE — 3288F PR FALLS RISK ASSESSMENT DOCUMENTED: ICD-10-PCS | Mod: CPTII,S$GLB,, | Performed by: INTERNAL MEDICINE

## 2023-02-02 PROCEDURE — 99204 OFFICE O/P NEW MOD 45 MIN: CPT | Mod: 25,S$GLB,, | Performed by: INTERNAL MEDICINE

## 2023-02-02 PROCEDURE — 3288F FALL RISK ASSESSMENT DOCD: CPT | Mod: CPTII,S$GLB,, | Performed by: INTERNAL MEDICINE

## 2023-02-02 PROCEDURE — 99999 PR PBB SHADOW E&M-EST. PATIENT-LVL V: ICD-10-PCS | Mod: PBBFAC,,, | Performed by: INTERNAL MEDICINE

## 2023-02-02 PROCEDURE — 1160F PR REVIEW ALL MEDS BY PRESCRIBER/CLIN PHARMACIST DOCUMENTED: ICD-10-PCS | Mod: CPTII,S$GLB,, | Performed by: INTERNAL MEDICINE

## 2023-02-02 PROCEDURE — 1160F RVW MEDS BY RX/DR IN RCRD: CPT | Mod: CPTII,S$GLB,, | Performed by: INTERNAL MEDICINE

## 2023-02-02 PROCEDURE — 1101F PT FALLS ASSESS-DOCD LE1/YR: CPT | Mod: CPTII,S$GLB,, | Performed by: INTERNAL MEDICINE

## 2023-02-02 PROCEDURE — 99999 PR PBB SHADOW E&M-EST. PATIENT-LVL V: CPT | Mod: PBBFAC,,, | Performed by: INTERNAL MEDICINE

## 2023-02-02 PROCEDURE — 93000 ELECTROCARDIOGRAM COMPLETE: CPT | Mod: S$GLB,,, | Performed by: INTERNAL MEDICINE

## 2023-02-02 PROCEDURE — 3078F PR MOST RECENT DIASTOLIC BLOOD PRESSURE < 80 MM HG: ICD-10-PCS | Mod: CPTII,S$GLB,, | Performed by: INTERNAL MEDICINE

## 2023-02-02 PROCEDURE — 3074F PR MOST RECENT SYSTOLIC BLOOD PRESSURE < 130 MM HG: ICD-10-PCS | Mod: CPTII,S$GLB,, | Performed by: INTERNAL MEDICINE

## 2023-02-02 PROCEDURE — 1126F AMNT PAIN NOTED NONE PRSNT: CPT | Mod: CPTII,S$GLB,, | Performed by: INTERNAL MEDICINE

## 2023-02-02 PROCEDURE — 93000 EKG 12-LEAD: ICD-10-PCS | Mod: S$GLB,,, | Performed by: INTERNAL MEDICINE

## 2023-02-02 PROCEDURE — 3078F DIAST BP <80 MM HG: CPT | Mod: CPTII,S$GLB,, | Performed by: INTERNAL MEDICINE

## 2023-02-02 PROCEDURE — 3008F BODY MASS INDEX DOCD: CPT | Mod: CPTII,S$GLB,, | Performed by: INTERNAL MEDICINE

## 2023-02-02 PROCEDURE — 3008F PR BODY MASS INDEX (BMI) DOCUMENTED: ICD-10-PCS | Mod: CPTII,S$GLB,, | Performed by: INTERNAL MEDICINE

## 2023-02-02 RX ORDER — LANOLIN ALCOHOL/MO/W.PET/CERES
CREAM (GRAM) TOPICAL
Refills: 0
Start: 2023-02-02

## 2023-02-02 NOTE — PROGRESS NOTES
Subjective:   02/07/2023     Patient ID:  Giovanni Moncada is a 67 y.o. male who presents for evaulation of Hypertension      Comes in for cardiac evaluation.  He is never had a cardiac event.  In the past, has had a stress test showing no ischemia.  Echocardiography has been normal.  Does have occasional palpitations.  Monitoring in the past has shown brief SVT and PACs/PVCs.  These are fairly minimally concerning to him.      He walks over 10,000 steps a day.  He has no exertional chest pains or tightness.      Did have squamous cell cancer of the neck.  He underwent extensive radiation therapy to his neck at that time.  No chemotherapy.  That was 17 years ago, he is not had a recurrence.    He does have hypercholesterolemia, on atorvastatin 10 mg daily.  Will obtain a CT coronary calcium score to further gauge risk.  Family history is positive for coronary artery disease.      Hypertension is present, currently on Sectral and amlodipine for blood pressure control, appears excellent today.      History reviewed. No pertinent past medical history.    Review of patient's allergies indicates:   Allergen Reactions    Lamisil [terbinafine] Hives         Current Outpatient Medications:     a-cysteine/arg zinc/glut/mv-mn (L GLUTAMINE-N ACET-ARG-VIT-MIN ORAL), Take by mouth., Disp: , Rfl:     acebutoloL (SECTRAL) 200 MG capsule, Take 1 capsule (200 mg total) by mouth 2 (two) times daily., Disp: 180 capsule, Rfl: 3    amLODIPine (NORVASC) 5 MG tablet, Take 1 tablet (5 mg total) by mouth every morning., Disp: 90 tablet, Rfl: 3    ascorbic acid, vitamin C, (VITAMIN C) 1000 MG tablet, Take 1,000 mg by mouth., Disp: , Rfl:     aspirin (ECOTRIN) 81 MG EC tablet, Take 81 mg by mouth once daily., Disp: , Rfl:     atorvastatin (LIPITOR) 10 MG tablet, Take 1 tablet (10 mg total) by mouth nightly., Disp: 90 tablet, Rfl: 3    bacillus coagulans-inulin 1 billion-250 cell-mg Cap, Take by mouth., Disp: , Rfl:     cholecalciferol,  vitamin D3, 5,000 unit Tab, Take by mouth., Disp: , Rfl:     coenzyme Q10 100 mg capsule, Take 100 mg by mouth once daily., Disp: , Rfl:     cyanocobalamin (VITAMIN B-12) 1000 MCG tablet, Take 100 mcg by mouth once daily., Disp: , Rfl:     doxazosin (CARDURA) 1 MG tablet, Take 1 tablet (1 mg total) by mouth nightly., Disp: 90 tablet, Rfl: 3    ergocalciferol (ERGOCALCIFEROL) 50,000 unit Cap, Take 50,000 Units by mouth every 7 days., Disp: , Rfl:     fluticasone propionate (FLONASE) 50 mcg/actuation nasal spray, 1 spray (50 mcg total) by Each Nostril route once daily., Disp: 48 g, Rfl: 3    glucosamine-chondroit-vit C-Mn 500-400 mg capsule, Take by mouth., Disp: , Rfl:     GLUCOSAMINE/CHONDR MARI A SOD (OSTEO BI-FLEX ORAL), Take 1,500 mg by mouth., Disp: , Rfl:     olmesartan (BENICAR) 20 MG tablet, Take 1 tablet (20 mg total) by mouth every evening., Disp: 90 tablet, Rfl: 3    omega-3 acid ethyl esters (LOVAZA) 1 gram capsule, Take 2 capsules (2 g total) by mouth 2 (two) times daily., Disp: 360 capsule, Rfl: 3    omeprazole (PRILOSEC) 20 MG capsule, Take 1 capsule (20 mg total) by mouth once daily., Disp: 90 capsule, Rfl: 3    sildenafiL (VIAGRA) 100 MG tablet, Take 1 tablet (100 mg total) by mouth daily as needed for Erectile Dysfunction., Disp: 30 tablet, Rfl: 11    vitamin A acetate 10,000 unit Subl, Place 2,400 mcg under the tongue., Disp: , Rfl:     zinc acetate 50 mg (zinc) Cap, Take 50 mg by mouth., Disp: , Rfl:     zinc gluconate 50 mg tablet, Take 50 mg by mouth once daily., Disp: , Rfl:     magnesium oxide (MAG-OX) 400 mg (241.3 mg magnesium) tablet, Take 1 twice a day for 1 month, then 1 daily; if diarrhea occurs, decrease dose, Disp: , Rfl: 0     Objective:   Review of Systems   Cardiovascular:  Positive for irregular heartbeat and palpitations. Negative for chest pain, claudication, cyanosis, dyspnea on exertion, leg swelling, near-syncope, orthopnea, paroxysmal nocturnal dyspnea and syncope.        Vitals:    02/02/23 1017   BP: 117/72   Pulse: (!) 56     Wt Readings from Last 3 Encounters:   02/02/23 86.7 kg (191 lb 2.2 oz)   01/26/23 87.4 kg (192 lb 9.2 oz)   09/29/22 86.8 kg (191 lb 5.8 oz)     Temp Readings from Last 3 Encounters:   09/29/22 98 °F (36.7 °C) (Oral)   09/13/21 98.1 °F (36.7 °C)   05/25/20 98.3 °F (36.8 °C) (Oral)     BP Readings from Last 3 Encounters:   02/02/23 117/72   01/26/23 120/80   09/29/22 130/80     Pulse Readings from Last 3 Encounters:   02/02/23 (!) 56   01/26/23 (!) 53   09/29/22 (!) 52             Physical Exam      Lab Results   Component Value Date    CHOL 160 02/01/2023    CHOL 160 02/01/2023    CHOL 137 09/28/2022     Lab Results   Component Value Date    HDL 52 02/01/2023    HDL 52 02/01/2023    HDL 48 09/28/2022     Lab Results   Component Value Date    LDLCALC 83.8 02/01/2023    LDLCALC 83.8 02/01/2023    LDLCALC 64.6 09/28/2022     Lab Results   Component Value Date    ALT 17 02/01/2023    ALT 17 02/01/2023    AST 22 02/01/2023    AST 22 02/01/2023    AST 20 09/28/2022     Lab Results   Component Value Date    CREATININE 0.9 02/01/2023    CREATININE 0.9 02/01/2023    BUN 14 02/01/2023    BUN 14 02/01/2023     02/01/2023     02/01/2023    K 4.4 02/01/2023    K 4.4 02/01/2023    CO2 24 02/01/2023    CO2 24 02/01/2023    CO2 25 09/28/2022     Lab Results   Component Value Date    HGB 14.1 09/23/2019    HCT 44.3 09/23/2019                         Assessment and Plan:     Essential hypertension  Comments:  Currently well controlled  Orders:  -     Ambulatory referral/consult to Cardiology  -     IN OFFICE EKG 12-LEAD (to [facility]); Future  -     CV Ultrasound Bilateral Doppler Carotid; Future; Expected date: 02/03/2023    Hyperlipidemia, mixed  Comments:  Will do CT coronary calcium score to assess LDL goal  Orders:  -     Ambulatory referral/consult to Cardiology  -     CV Ultrasound Bilateral Doppler Carotid; Future; Expected date:  02/03/2023    Palpitations  Comments:  Magnesium  Orders:  -     Ambulatory referral/consult to Cardiology  -     magnesium oxide (MAG-OX) 400 mg (241.3 mg magnesium) tablet; Take 1 twice a day for 1 month, then 1 daily; if diarrhea occurs, decrease dose; Refill: 0    Sick sinus syndrome    S/P radiation therapy > 12 wks ago  -     CV Ultrasound Bilateral Doppler Carotid; Future; Expected date: 02/03/2023    Encounter for screening for cardiovascular disorders  -     CT Cardiac Scoring; Future; Expected date: 02/02/2023         No follow-ups on file.          Future Appointments   Date Time Provider Department Center   2/8/2023  2:30 PM [facility]-CT2 [address] [facility]   3/31/2023  9:45 AM Arnel Flaherty MD [facility]

## 2023-02-07 ENCOUNTER — PATIENT MESSAGE (OUTPATIENT)
Dept: CARDIOLOGY | Facility: CLINIC | Age: 68
End: 2023-02-07
Payer: MEDICARE

## 2023-02-07 ENCOUNTER — HOSPITAL ENCOUNTER (OUTPATIENT)
Dept: CARDIOLOGY | Facility: HOSPITAL | Age: 68
Discharge: HOME | End: 2023-02-07
Attending: INTERNAL MEDICINE
Payer: MEDICARE

## 2023-02-07 DIAGNOSIS — E78.2 HYPERLIPIDEMIA, MIXED: ICD-10-CM

## 2023-02-07 DIAGNOSIS — I10 ESSENTIAL HYPERTENSION: ICD-10-CM

## 2023-02-07 DIAGNOSIS — Z92.3 S/P RADIATION THERAPY > 12 WKS AGO: ICD-10-CM

## 2023-02-07 LAB
LEFT ARM DIASTOLIC BLOOD PRESSURE: 70 MMHG
LEFT ARM SYSTOLIC BLOOD PRESSURE: 117 MMHG
LEFT CBA DIAS: 22 CM/S
LEFT CBA SYS: 70 CM/S
LEFT CCA DIST DIAS: 29 CM/S
LEFT CCA DIST SYS: 82 CM/S
LEFT CCA MID DIAS: 28 CM/S
LEFT CCA MID SYS: 97 CM/S
LEFT CCA PROX DIAS: 27 CM/S
LEFT CCA PROX SYS: 108 CM/S
LEFT ECA DIAS: 20 CM/S
LEFT ECA SYS: 94 CM/S
LEFT ICA DIST DIAS: 33 CM/S
LEFT ICA DIST SYS: 105 CM/S
LEFT ICA MID DIAS: 31 CM/S
LEFT ICA MID SYS: 64 CM/S
LEFT ICA PROX DIAS: 26 CM/S
LEFT ICA PROX SYS: 77 CM/S
LEFT VERTEBRAL DIAS: 19 CM/S
LEFT VERTEBRAL SYS: 55 CM/S
OHS CV CAROTID RIGHT ICA EDV HIGHEST: 29
OHS CV CAROTID ULTRASOUND LEFT ICA/CCA RATIO: 1.28
OHS CV CAROTID ULTRASOUND RIGHT ICA/CCA RATIO: 0.97
OHS CV PV CAROTID LEFT HIGHEST CCA: 108
OHS CV PV CAROTID LEFT HIGHEST ICA: 105
OHS CV PV CAROTID RIGHT HIGHEST CCA: 97
OHS CV PV CAROTID RIGHT HIGHEST ICA: 94
OHS CV US CAROTID LEFT HIGHEST EDV: 33
RIGHT ARM DIASTOLIC BLOOD PRESSURE: 72 MMHG
RIGHT ARM SYSTOLIC BLOOD PRESSURE: 117 MMHG
RIGHT CBA DIAS: 21 CM/S
RIGHT CBA SYS: 79 CM/S
RIGHT CCA DIST DIAS: 27 CM/S
RIGHT CCA DIST SYS: 97 CM/S
RIGHT CCA MID DIAS: 13 CM/S
RIGHT CCA MID SYS: 74 CM/S
RIGHT CCA PROX DIAS: 19 CM/S
RIGHT CCA PROX SYS: 93 CM/S
RIGHT ECA DIAS: 19 CM/S
RIGHT ECA SYS: 93 CM/S
RIGHT ICA DIST DIAS: 26 CM/S
RIGHT ICA DIST SYS: 73 CM/S
RIGHT ICA MID DIAS: 29 CM/S
RIGHT ICA MID SYS: 78 CM/S
RIGHT ICA PROX DIAS: 22 CM/S
RIGHT ICA PROX SYS: 94 CM/S
RIGHT VERTEBRAL DIAS: 32 CM/S
RIGHT VERTEBRAL SYS: 73 CM/S

## 2023-02-07 PROCEDURE — 93880 CV US DOPPLER CAROTID (CUPID ONLY): ICD-10-PCS | Mod: 26,,, | Performed by: INTERNAL MEDICINE

## 2023-02-07 PROCEDURE — 93880 EXTRACRANIAL BILAT STUDY: CPT | Mod: 26,,, | Performed by: INTERNAL MEDICINE

## 2023-02-07 PROCEDURE — 93880 EXTRACRANIAL BILAT STUDY: CPT

## 2023-02-08 ENCOUNTER — TELEPHONE (OUTPATIENT)
Dept: CARDIOLOGY | Facility: CLINIC | Age: 68
End: 2023-02-08
Payer: MEDICARE

## 2023-02-08 ENCOUNTER — HOSPITAL ENCOUNTER (OUTPATIENT)
Dept: RADIOLOGY | Facility: HOSPITAL | Age: 68
Discharge: HOME | End: 2023-02-08
Attending: INTERNAL MEDICINE
Payer: MEDICARE

## 2023-02-08 DIAGNOSIS — Z13.6 ENCOUNTER FOR SCREENING FOR CARDIOVASCULAR DISORDERS: ICD-10-CM

## 2023-02-08 PROCEDURE — 75571 CT HRT W/O DYE W/CA TEST: CPT | Mod: TC

## 2023-02-08 PROCEDURE — 75571 CT CALCIUM SCORING CARDIAC: ICD-10-PCS | Mod: 26,,, | Performed by: RADIOLOGY

## 2023-02-08 PROCEDURE — 75571 CT HRT W/O DYE W/CA TEST: CPT | Mod: 26,,, | Performed by: RADIOLOGY

## 2023-02-08 NOTE — PROGRESS NOTES
Discussed with patient, CT coronary calcium score is 0.  Cardiac risk is low.  Carotid ultrasound did not show significant stenoses.    Would not change current low-dose statin therapy

## 2023-02-10 ENCOUNTER — PES CALL (OUTPATIENT)
Dept: ADMINISTRATIVE | Facility: CLINIC | Age: 68
End: 2023-02-10
Payer: MEDICARE

## 2023-03-02 ENCOUNTER — TELEPHONE (OUTPATIENT)
Dept: DERMATOLOGY | Facility: CLINIC | Age: 68
End: 2023-03-02
Payer: MEDICARE

## 2023-03-02 NOTE — TELEPHONE ENCOUNTER
Called patient to schedule an appointment with Dermatology, per message. No answer. Message was left asking patient to call the office back for an appointment.       RD        ----- Message from Lily Moran MA sent at 2/28/2023  5:07 PM CST -----  Regarding: FW: Scheduling Assistance    ----- Message -----  From: Desirae Chisholm  Sent: 2/28/2023   3:47 PM CST  To: [facility] Staff  Subject: Scheduling Assistance                            Patient is requesting to schedule an appointment with Dr. Ku, he would like his daughter Kirsten to be called to assist with scheduling. Her number is

## 2023-04-03 ENCOUNTER — OFFICE VISIT (OUTPATIENT)
Dept: DERMATOLOGY | Facility: CLINIC | Age: 68
End: 2023-04-03
Payer: MEDICARE

## 2023-04-03 DIAGNOSIS — L81.4 LENTIGO: ICD-10-CM

## 2023-04-03 DIAGNOSIS — Z85.89 HISTORY OF SQUAMOUS CELL CARCINOMA: ICD-10-CM

## 2023-04-03 DIAGNOSIS — R21 RASH: Primary | ICD-10-CM

## 2023-04-03 DIAGNOSIS — B35.3 TINEA PEDIS, UNSPECIFIED LATERALITY: ICD-10-CM

## 2023-04-03 DIAGNOSIS — Z12.83 SCREENING EXAM FOR SKIN CANCER: ICD-10-CM

## 2023-04-03 DIAGNOSIS — D22.9 MULTIPLE BENIGN NEVI: ICD-10-CM

## 2023-04-03 DIAGNOSIS — D18.01 CHERRY ANGIOMA: ICD-10-CM

## 2023-04-03 DIAGNOSIS — L82.1 SEBORRHEIC KERATOSES: ICD-10-CM

## 2023-04-03 PROCEDURE — 1159F PR MEDICATION LIST DOCUMENTED IN MEDICAL RECORD: ICD-10-PCS | Mod: CPTII,S$GLB,, | Performed by: STUDENT IN AN ORGANIZED HEALTH CARE EDUCATION/TRAINING PROGRAM

## 2023-04-03 PROCEDURE — 1126F AMNT PAIN NOTED NONE PRSNT: CPT | Mod: CPTII,S$GLB,, | Performed by: STUDENT IN AN ORGANIZED HEALTH CARE EDUCATION/TRAINING PROGRAM

## 2023-04-03 PROCEDURE — 1126F PR PAIN SEVERITY QUANTIFIED, NO PAIN PRESENT: ICD-10-PCS | Mod: CPTII,S$GLB,, | Performed by: STUDENT IN AN ORGANIZED HEALTH CARE EDUCATION/TRAINING PROGRAM

## 2023-04-03 PROCEDURE — 99203 PR OFFICE/OUTPT VISIT, NEW, LEVL III, 30-44 MIN: ICD-10-PCS | Mod: S$GLB,,, | Performed by: STUDENT IN AN ORGANIZED HEALTH CARE EDUCATION/TRAINING PROGRAM

## 2023-04-03 PROCEDURE — 99999 PR PBB SHADOW E&M-EST. PATIENT-LVL IV: ICD-10-PCS | Mod: PBBFAC,,, | Performed by: STUDENT IN AN ORGANIZED HEALTH CARE EDUCATION/TRAINING PROGRAM

## 2023-04-03 PROCEDURE — 1160F PR REVIEW ALL MEDS BY PRESCRIBER/CLIN PHARMACIST DOCUMENTED: ICD-10-PCS | Mod: CPTII,S$GLB,, | Performed by: STUDENT IN AN ORGANIZED HEALTH CARE EDUCATION/TRAINING PROGRAM

## 2023-04-03 PROCEDURE — 1101F PR PT FALLS ASSESS DOC 0-1 FALLS W/OUT INJ PAST YR: ICD-10-PCS | Mod: CPTII,S$GLB,, | Performed by: STUDENT IN AN ORGANIZED HEALTH CARE EDUCATION/TRAINING PROGRAM

## 2023-04-03 PROCEDURE — 3288F FALL RISK ASSESSMENT DOCD: CPT | Mod: CPTII,S$GLB,, | Performed by: STUDENT IN AN ORGANIZED HEALTH CARE EDUCATION/TRAINING PROGRAM

## 2023-04-03 PROCEDURE — 1160F RVW MEDS BY RX/DR IN RCRD: CPT | Mod: CPTII,S$GLB,, | Performed by: STUDENT IN AN ORGANIZED HEALTH CARE EDUCATION/TRAINING PROGRAM

## 2023-04-03 PROCEDURE — 1101F PT FALLS ASSESS-DOCD LE1/YR: CPT | Mod: CPTII,S$GLB,, | Performed by: STUDENT IN AN ORGANIZED HEALTH CARE EDUCATION/TRAINING PROGRAM

## 2023-04-03 PROCEDURE — 1159F MED LIST DOCD IN RCRD: CPT | Mod: CPTII,S$GLB,, | Performed by: STUDENT IN AN ORGANIZED HEALTH CARE EDUCATION/TRAINING PROGRAM

## 2023-04-03 PROCEDURE — 99999 PR PBB SHADOW E&M-EST. PATIENT-LVL IV: CPT | Mod: PBBFAC,,, | Performed by: STUDENT IN AN ORGANIZED HEALTH CARE EDUCATION/TRAINING PROGRAM

## 2023-04-03 PROCEDURE — 3288F PR FALLS RISK ASSESSMENT DOCUMENTED: ICD-10-PCS | Mod: CPTII,S$GLB,, | Performed by: STUDENT IN AN ORGANIZED HEALTH CARE EDUCATION/TRAINING PROGRAM

## 2023-04-03 PROCEDURE — 99203 OFFICE O/P NEW LOW 30 MIN: CPT | Mod: S$GLB,,, | Performed by: STUDENT IN AN ORGANIZED HEALTH CARE EDUCATION/TRAINING PROGRAM

## 2023-04-03 RX ORDER — CICLOPIROX OLAMINE 7.7 MG/G
CREAM TOPICAL 2 TIMES DAILY
Qty: 90 G | Refills: 1 | Status: SHIPPED | OUTPATIENT
Start: 2023-04-03 | End: 2023-04-03

## 2023-04-03 RX ORDER — CICLOPIROX OLAMINE 7.7 MG/G
CREAM TOPICAL 2 TIMES DAILY
Qty: 90 G | Refills: 1 | Status: SHIPPED | OUTPATIENT
Start: 2023-04-03

## 2023-04-03 NOTE — PROGRESS NOTES
Subjective:      Patient ID:  Giovanni Moncada is a 67 y.o. male who presents for   Chief Complaint   Patient presents with    Skin Check     tbse     HPI  Patient here for total Body Skin Exam    Last seen by dermatologist: 2-3 years ago    no - personal history of atypical moles removed  no personal history of MM   no - family history of MM  no - childhood blistering sunburns  no - tanning bed use  Yes SCC stage N4- personal history of NMSC    Patient with specific complaint of lesion(s)  Location: fungus  Duration: years  Symptoms: itches  Relieving factors/Previous treatments: alcohol; athletes foot    OR    No new concerning moles or lesions    Problem List Items Addressed This Visit          Oncology    History of squamous cell carcinoma    Overview     - 2004, had SCC metastasized to right cervical lymph nodes. S/p Lymph node dissection and radiation at [facility]. No known primary site ever identified  - No history known skin cancer            Other Visit Diagnoses       Rash    -  Primary    Relevant Medications    ciclopirox (LOPROX) 0.77 % Crea    Seborrheic keratoses        Multiple benign nevi        Cherry angioma        Lentigo        Screening exam for skin cancer        Tinea pedis, unspecified laterality        Relevant Medications    ciclopirox (LOPROX) 0.77 % Crea              Review of Systems   Skin:  Positive for activity-related sunscreen use and wears hat. Negative for daily sunscreen use and recent sunburn.   Hematologic/Lymphatic: Does not bruise/bleed easily.     Objective:   Physical Exam   Constitutional: He appears well-developed and well-nourished. No distress.   Neurological: He is alert and oriented to person, place, and time. He is not disoriented.   Psychiatric: He has a normal mood and affect.   Skin:   Areas Examined (abnormalities noted in diagram):   Scalp / Hair Palpated and Inspected  Head / Face Inspection Performed  Neck Inspection Performed  Chest / Axilla Inspection  Performed  Abdomen Inspection Performed  Genitals / Buttocks / Groin Inspection Performed  Back Inspection Performed  RUE Inspected  LUE Inspection Performed  RLE Inspected  LLE Inspection Performed  Nails and Digits Inspection Performed                   Diagram Legend     Erythematous scaling macule/papule c/w actinic keratosis       Vascular papule c/w angioma      Pigmented verrucoid papule/plaque c/w seborrheic keratosis      Yellow umbilicated papule c/w sebaceous hyperplasia      Irregularly shaped tan macule c/w lentigo     1-2 mm smooth white papules consistent with Milia      Movable subcutaneous cyst with punctum c/w epidermal inclusion cyst      Subcutaneous movable cyst c/w pilar cyst      Firm pink to brown papule c/w dermatofibroma      Pedunculated fleshy papule(s) c/w skin tag(s)      Evenly pigmented macule c/w junctional nevus     Mildly variegated pigmented, slightly irregular-bordered macule c/w mildly atypical nevus      Flesh colored to evenly pigmented papule c/w intradermal nevus       Pink pearly papule/plaque c/w basal cell carcinoma      Erythematous hyperkeratotic cursted plaque c/w SCC      Surgical scar with no sign of skin cancer recurrence      Open and closed comedones      Inflammatory papules and pustules      Verrucoid papule consistent consistent with wart     Erythematous eczematous patches and plaques     Dystrophic onycholytic nail with subungual debris c/w onychomycosis     Umbilicated papule    Erythematous-base heme-crusted tan verrucoid plaque consistent with inflamed seborrheic keratosis     Erythematous Silvery Scaling Plaque c/w Psoriasis     See annotation      Assessment / Plan:        Rash--nonspecific red scaly patches on buttocks. Ddx includes tinea and dermatitis. He does have tinea pedis and onychomycosis. Will treat empirically  -     ciclopirox (LOPROX) 0.77 % Crea; Apply topically 2 (two) times daily. Use for 2-4 weeks to rash on buttocks and to rash on feet  when needed until resolved  Dispense: 90 g; Refill: 1    History of squamous cell carcinoma--metastatic to lymph nodes. Unknown primary. No known history of cutaneous SCC  - Radiation field examined and lymph nodes palpated without concerning findings    Seborrheic keratoses  Multiple benign nevi  Cherry angioma  Lentigo  Reassurance given to patient. No treatment is necessary.   Treatment of benign, asymptomatic lesions may be considered cosmetic.    Screening exam for skin cancer  Area of previous lymph node dissection examined. Site well healed with no signs of recurrence.    Total body skin examination performed today including at least 12 points as noted in physical examination. No lesions suspicious for malignancy noted.    Recommend daily sun protection/avoidance, use of at least SPF 30, broad spectrum sunscreen (OTC drug), skin self examinations, and routine physician surveillance to optimize early detection    Tinea pedis--b/l in 4th toe web space  -     ciclopirox (LOPROX) 0.77 % Crea; Apply topically 2 (two) times daily. Use for 2-4 weeks to rash on buttocks and to rash on feet when needed until resolved  Dispense: 90 g; Refill: 1             Follow up in about 1 year (around 4/3/2024).

## 2023-04-06 ENCOUNTER — OFFICE VISIT (OUTPATIENT)
Dept: PRIMARY CARE CLINIC | Facility: CLINIC | Age: 68
End: 2023-04-06
Payer: MEDICARE

## 2023-04-06 VITALS
DIASTOLIC BLOOD PRESSURE: 70 MMHG | SYSTOLIC BLOOD PRESSURE: 120 MMHG | HEIGHT: 69 IN | OXYGEN SATURATION: 98 % | HEART RATE: 55 BPM | WEIGHT: 190.25 LBS | BODY MASS INDEX: 28.18 KG/M2

## 2023-04-06 DIAGNOSIS — Z85.89 HISTORY OF SQUAMOUS CELL CARCINOMA: ICD-10-CM

## 2023-04-06 DIAGNOSIS — I10 ESSENTIAL HYPERTENSION: ICD-10-CM

## 2023-04-06 DIAGNOSIS — K21.9 GERD WITHOUT ESOPHAGITIS: ICD-10-CM

## 2023-04-06 DIAGNOSIS — E78.2 HYPERLIPIDEMIA, MIXED: ICD-10-CM

## 2023-04-06 DIAGNOSIS — N52.9 ERECTILE DYSFUNCTION, UNSPECIFIED ERECTILE DYSFUNCTION TYPE: ICD-10-CM

## 2023-04-06 DIAGNOSIS — G89.29 CHRONIC NECK PAIN: ICD-10-CM

## 2023-04-06 DIAGNOSIS — N40.0 BENIGN PROSTATIC HYPERPLASIA WITHOUT LOWER URINARY TRACT SYMPTOMS: Primary | ICD-10-CM

## 2023-04-06 DIAGNOSIS — M54.2 CHRONIC NECK PAIN: ICD-10-CM

## 2023-04-06 PROCEDURE — 3008F BODY MASS INDEX DOCD: CPT | Mod: CPTII,S$GLB,, | Performed by: INTERNAL MEDICINE

## 2023-04-06 PROCEDURE — 3074F SYST BP LT 130 MM HG: CPT | Mod: CPTII,S$GLB,, | Performed by: INTERNAL MEDICINE

## 2023-04-06 PROCEDURE — 3008F PR BODY MASS INDEX (BMI) DOCUMENTED: ICD-10-PCS | Mod: CPTII,S$GLB,, | Performed by: INTERNAL MEDICINE

## 2023-04-06 PROCEDURE — 1159F PR MEDICATION LIST DOCUMENTED IN MEDICAL RECORD: ICD-10-PCS | Mod: CPTII,S$GLB,, | Performed by: INTERNAL MEDICINE

## 2023-04-06 PROCEDURE — 99999 PR PBB SHADOW E&M-EST. PATIENT-LVL IV: CPT | Mod: PBBFAC,,, | Performed by: INTERNAL MEDICINE

## 2023-04-06 PROCEDURE — 99999 PR PBB SHADOW E&M-EST. PATIENT-LVL IV: ICD-10-PCS | Mod: PBBFAC,,, | Performed by: INTERNAL MEDICINE

## 2023-04-06 PROCEDURE — 4010F PR ACE/ARB THEARPY RXD/TAKEN: ICD-10-PCS | Mod: CPTII,S$GLB,, | Performed by: INTERNAL MEDICINE

## 2023-04-06 PROCEDURE — 3074F PR MOST RECENT SYSTOLIC BLOOD PRESSURE < 130 MM HG: ICD-10-PCS | Mod: CPTII,S$GLB,, | Performed by: INTERNAL MEDICINE

## 2023-04-06 PROCEDURE — 99214 PR OFFICE/OUTPT VISIT, EST, LEVL IV, 30-39 MIN: ICD-10-PCS | Mod: S$GLB,,, | Performed by: INTERNAL MEDICINE

## 2023-04-06 PROCEDURE — 4010F ACE/ARB THERAPY RXD/TAKEN: CPT | Mod: CPTII,S$GLB,, | Performed by: INTERNAL MEDICINE

## 2023-04-06 PROCEDURE — 99214 OFFICE O/P EST MOD 30 MIN: CPT | Mod: S$GLB,,, | Performed by: INTERNAL MEDICINE

## 2023-04-06 PROCEDURE — 1159F MED LIST DOCD IN RCRD: CPT | Mod: CPTII,S$GLB,, | Performed by: INTERNAL MEDICINE

## 2023-04-06 PROCEDURE — 1126F AMNT PAIN NOTED NONE PRSNT: CPT | Mod: CPTII,S$GLB,, | Performed by: INTERNAL MEDICINE

## 2023-04-06 PROCEDURE — 3078F PR MOST RECENT DIASTOLIC BLOOD PRESSURE < 80 MM HG: ICD-10-PCS | Mod: CPTII,S$GLB,, | Performed by: INTERNAL MEDICINE

## 2023-04-06 PROCEDURE — 1126F PR PAIN SEVERITY QUANTIFIED, NO PAIN PRESENT: ICD-10-PCS | Mod: CPTII,S$GLB,, | Performed by: INTERNAL MEDICINE

## 2023-04-06 PROCEDURE — 3078F DIAST BP <80 MM HG: CPT | Mod: CPTII,S$GLB,, | Performed by: INTERNAL MEDICINE

## 2023-04-06 RX ORDER — TADALAFIL 20 MG/1
20 TABLET ORAL DAILY PRN
Qty: 30 TABLET | Refills: 11 | Status: SHIPPED | OUTPATIENT
Start: 2023-04-06 | End: 2024-04-05

## 2023-04-06 RX ORDER — AMLODIPINE BESYLATE 5 MG/1
5 TABLET ORAL EVERY MORNING
Qty: 90 TABLET | Refills: 3 | Status: SHIPPED | OUTPATIENT
Start: 2023-04-06

## 2023-04-06 RX ORDER — DOXAZOSIN 1 MG/1
1 TABLET ORAL NIGHTLY
Qty: 90 TABLET | Refills: 3 | Status: SHIPPED | OUTPATIENT
Start: 2023-04-06

## 2023-04-06 RX ORDER — ATORVASTATIN CALCIUM 10 MG/1
10 TABLET, FILM COATED ORAL NIGHTLY
Qty: 90 TABLET | Refills: 3 | Status: SHIPPED | OUTPATIENT
Start: 2023-04-06

## 2023-04-06 NOTE — PROGRESS NOTES
[facility] Note    Chief Complaint      Chief Complaint   Patient presents with    Follow-up       History of Present Illness      Giovanni Moncada is a 67 y.o. male with chronic conditions of HTN, HLD, BPH, GERD, ED, hx of squamous cell carcinoma of neck who presents today for: follow up chronic conditions.  HTN: BP at goal on benicar, amlodipine, acebutolol, doxazosin.  HLD: controlled on atorvastatin.  LDL 83.  Sees Dr. Rogers.  ED: Sees Dr. Aquino, urology.  On viagra  GERD:  Sees Dr. Garcia. Controlled on omeprazole.  No new or worsening symptoms.   Cardiac arrhythmia: Sees Dr. Rogers.  Previous diagnosed during stress test.  On acebutolol for rate control.  BPH: Sees Dr. Aquino.  Controlled on doxazosin.  No new or worsening symptoms  Hx of squamous cell carcinoma neck s/p resection: no new changes.  Flu shot declines.  Td 2005.  Pneumonia vaccines declines.  Shingles vaccine declines.  COVID vaccine UTD.  Cscope 2021, Dr. Mendes, polyps, 5 yr interval.     Past Medical History:  History reviewed. No pertinent past medical history.    Past Surgical History:   has a past surgical history that includes Neck surgery; Cataract extraction, bilateral (02/2020);  colonoscopy (01/07/2016); colonoscopy (01/26/2021); Eye surgery (2020); Hernia repair (2020); Tonsillectomy (1964); and Cholecystectomy (2020).    Family History:  family history includes Cancer in his mother; Heart disease in his father.     Social History:  Social History     Tobacco Use    Smoking status: Never    Smokeless tobacco: Never   Substance Use Topics    Alcohol use: Yes     Alcohol/week: 8.0 standard drinks     Types: 4 Glasses of wine, 4 Drinks containing 0.5 oz of alcohol per week    Drug use: No       I personally reviewed all past medical, surgical, social and family history.    Review of Systems   Constitutional:  Negative for chills, fever and malaise/fatigue.   Respiratory:  Negative for shortness of breath.     Cardiovascular:  Negative for chest pain.   Gastrointestinal:  Negative for constipation, diarrhea, nausea and vomiting.   Skin:  Negative for rash.   Neurological:  Negative for weakness.   All other systems reviewed and are negative.     Medications:  Outpatient Encounter Medications as of 4/6/2023   Medication Sig Dispense Refill    a-cysteine/arg zinc/glut/mv-mn (L GLUTAMINE-N ACET-ARG-VIT-MIN ORAL) Take by mouth.      acebutoloL (SECTRAL) 200 MG capsule Take 1 capsule (200 mg total) by mouth 2 (two) times daily. 180 capsule 3    amLODIPine (NORVASC) 5 MG tablet Take 1 tablet (5 mg total) by mouth every morning. 90 tablet 3    ascorbic acid, vitamin C, (VITAMIN C) 1000 MG tablet Take 1,000 mg by mouth.      aspirin (ECOTRIN) 81 MG EC tablet Take 81 mg by mouth once daily.      atorvastatin (LIPITOR) 10 MG tablet Take 1 tablet (10 mg total) by mouth nightly. 90 tablet 3    bacillus coagulans-inulin 1 billion-250 cell-mg Cap Take by mouth.      cholecalciferol, vitamin D3, 5,000 unit Tab Take by mouth.      ciclopirox (LOPROX) 0.77 % Crea Apply topically 2 (two) times daily. Use for 2-4 weeks to rash on buttocks and to rash on feet when needed until resolved 90 g 1    coenzyme Q10 100 mg capsule Take 100 mg by mouth once daily.      cyanocobalamin (VITAMIN B-12) 1000 MCG tablet Take 100 mcg by mouth once daily.      doxazosin (CARDURA) 1 MG tablet Take 1 tablet (1 mg total) by mouth nightly. 90 tablet 3    ergocalciferol (ERGOCALCIFEROL) 50,000 unit Cap Take 50,000 Units by mouth every 7 days.      fluticasone propionate (FLONASE) 50 mcg/actuation nasal spray 1 spray (50 mcg total) by Each Nostril route once daily. 48 g 3    glucosamine-chondroit-vit C-Mn 500-400 mg capsule Take by mouth.      GLUCOSAMINE/CHONDR MARI A SOD (OSTEO BI-FLEX ORAL) Take 1,500 mg by mouth.      magnesium oxide (MAG-OX) 400 mg (241.3 mg magnesium) tablet Take 1 twice a day for 1 month, then 1 daily; if diarrhea occurs, decrease dose  0     olmesartan (BENICAR) 20 MG tablet TAKE 1 TABLET BY MOUTH EVERY EVENING 90 tablet 3    omega-3 acid ethyl esters (LOVAZA) 1 gram capsule Take 2 capsules (2 g total) by mouth 2 (two) times daily. 360 capsule 3    omeprazole (PRILOSEC) 20 MG capsule Take 1 capsule (20 mg total) by mouth once daily. 90 capsule 3    tadalafiL (CIALIS) 20 MG Tab Take 1 tablet (20 mg total) by mouth daily as needed. 30 tablet 11    vitamin A acetate 10,000 unit Subl Place 2,400 mcg under the tongue.      zinc acetate 50 mg (zinc) Cap Take 50 mg by mouth.      zinc gluconate 50 mg tablet Take 50 mg by mouth once daily.      [DISCONTINUED] amLODIPine (NORVASC) 5 MG tablet Take 1 tablet (5 mg total) by mouth every morning. 90 tablet 3    [DISCONTINUED] atorvastatin (LIPITOR) 10 MG tablet Take 1 tablet (10 mg total) by mouth nightly. 90 tablet 3    [DISCONTINUED] doxazosin (CARDURA) 1 MG tablet Take 1 tablet (1 mg total) by mouth nightly. 90 tablet 3    [DISCONTINUED] olmesartan (BENICAR) 20 MG tablet Take 1 tablet (20 mg total) by mouth every evening. 90 tablet 3    [DISCONTINUED] omeprazole (PRILOSEC) 20 MG capsule Take 1 capsule (20 mg total) by mouth once daily. 90 capsule 3    [DISCONTINUED] sildenafiL (VIAGRA) 100 MG tablet Take 1 tablet (100 mg total) by mouth daily as needed for Erectile Dysfunction. 30 tablet 11     No facility-administered encounter medications on file as of 4/6/2023.       Allergies:  Review of patient's allergies indicates:   Allergen Reactions    Lamisil [terbinafine] Hives       Health Maintenance:  Immunization History   Administered Date(s) Administered    COVID-19, MRNA, LN-S, PF (Pfizer) (Purple Cap) 07/22/2021, 08/12/2021      Health Maintenance   Topic Date Due    Hepatitis C Screening  Never done    TETANUS VACCINE  Never done    Lipid Panel  02/01/2028        Physical Exam      Vital Signs  Pulse: (!) 55  SpO2: 98 %  BP: 120/70  BP Location: Right arm  Patient Position: Sitting  Pain Score: 0-No  "pain  Height and Weight  Height: 5' 9" (175.3 cm)  Weight: 86.3 kg (190 lb 4.1 oz)  BSA (Calculated - sq m): 2.05 sq meters  BMI (Calculated): 28.1  Weight in (lb) to have BMI = 25: 168.9]    Physical Exam  Vitals reviewed.   Constitutional:       Appearance: He is well-developed.   HENT:      Head: Normocephalic and atraumatic.      Right Ear: External ear normal.      Left Ear: External ear normal.   Cardiovascular:      Rate and Rhythm: Normal rate and regular rhythm.      Heart sounds: Normal heart sounds. No murmur heard.  Pulmonary:      Effort: Pulmonary effort is normal.      Breath sounds: Normal breath sounds. No wheezing or rales.   Abdominal:      General: Bowel sounds are normal.      Palpations: Abdomen is soft.        Laboratory:  CBC:      CMP:  Recent Labs   Lab 03/11/22  1107 09/28/22  0945 02/01/23  0950   Glucose 89 98 102  102   Calcium 10.5 10.1 10.0  10.0   Albumin 4.2 4.4 4.3  4.3   Total Protein 7.0 6.6 7.2  7.2   Sodium 140 137 138  138   Potassium 4.3 4.1 4.4  4.4   CO2 25 25 24  24   Chloride 105 105 106  106   BUN 18 16 14  14   Alkaline Phosphatase 80 78 85  85   ALT 15 18 17  17   AST 21 20 22  22   Total Bilirubin 0.6 0.6 0.7  0.7     URINALYSIS:  Recent Labs   Lab 02/01/23  0941   Color, UA Yellow   Specific Gravity, UA 1.025   pH, UA 6.0   Protein, UA Negative   Nitrite, UA Negative   Leukocytes, UA Negative      LIPIDS:  Recent Labs   Lab 03/11/22  1107 09/28/22  0945 02/01/23  0950   HDL 57 48 52  52   Cholesterol 136 137 160  160   Triglycerides 71 122 121  121   LDL Cholesterol 64.8 64.6 83.8  83.8   HDL/Cholesterol Ratio 41.9 35.0 32.5  32.5   Non-HDL Cholesterol 79 89 108  108   Total Cholesterol/HDL Ratio 2.4 2.9 3.1  3.1     TSH:      A1C:        Assessment/Plan     Giovanni Moncada is a 67 y.o.male with:    1. Essential hypertension  - amLODIPine (NORVASC) 5 MG tablet; Take 1 tablet (5 mg total) by mouth every morning.  Dispense: 90 tablet; Refill: " 3  - doxazosin (CARDURA) 1 MG tablet; Take 1 tablet (1 mg total) by mouth nightly.  Dispense: 90 tablet; Refill: 3  Continue current meds.    2. Hyperlipidemia, mixed  - atorvastatin (LIPITOR) 10 MG tablet; Take 1 tablet (10 mg total) by mouth nightly.  Dispense: 90 tablet; Refill: 3  - Comprehensive Metabolic Panel; Future  - Lipid Panel; Future  Continue current meds.    3. Benign prostatic hyperplasia without lower urinary tract symptoms  4. Erectile dysfunction, unspecified erectile dysfunction type  Continue current meds.    5. GERD without esophagitis  Continue current meds.    6. History of squamous cell carcinoma  Stable.  Monitor for new symptoms.  7. Chronic neck pain  Continue current meds.  F/U with Dr. Ramos    Chronic conditions status updated as per HPI.  Other than changes above, cont current medications and maintain follow up with specialists.  Follow up in about 6 months (around 10/6/2023) for Follow up visit.    Future Appointments   Date Time Provider Department Center   4/26/2023  2:30 PM Linnea Coleman NP [facility] IM [place]   5/26/2023 10:00 AM Arnav Aquino MD [facility] UROLOGY [facility]   10/9/2023  9:00 AM LAB, [facility] [place]   10/11/2023  9:30 AM Arnel Flaherty MD [facility] [place] Flaherty MD  [facility]

## 2023-05-24 NOTE — PROGRESS NOTES
Subjective:      Giovanni Moncada is a 67 y.o. male who returns today regarding his     EDalternates cialis and viagra    Doing well     No complaitns.    The following portions of the patient's history were reviewed and updated as appropriate: allergies, current medications, past family history, past medical history, past social history, past surgical history and problem list.    Review of Systems  A comprehensive multipoint review of systems was negative except as otherwise stated in the HPI.    No past medical history on file.  Past Surgical History:   Procedure Laterality Date     colonoscopy  01/07/2016    one 4 mm poly in the cecum. one 3 mm poly in the ascending colon. Repeat colonosocpy in 5 years    CATARACT EXTRACTION, BILATERAL  02/2020    CHOLECYSTECTOMY  2020    colonoscopy  01/26/2021    EYE SURGERY  2020    HERNIA REPAIR  2020    NECK SURGERY      TONSILLECTOMY  1964       Review of patient's allergies indicates:   Allergen Reactions    Lamisil [terbinafine] Hives          Objective:   Vitals: There were no vitals taken for this visit.    Physical Exam   General: alert, oriented to person, place and time, no acute distress and alert and oriented, no acute distress  Respiratory: Symmetric expansion, non-labored breathing  Cardiovascular: no peripheral edema  Abdomen: soft, non distended  Skin: normal coloration and turgor, no rashes, no suspicious skin lesions noted  Neuro: no gross deficits  Psych: normal judgment and insight, normal mood/affect, and non-anxious  ES no nodules  40g    Physical Exam    Lab Review   Urinalysis demonstrates negative for all components  Lab Results   Component Value Date    WBC 6.25 09/23/2019    HGB 14.1 09/23/2019    HCT 44.3 09/23/2019    MCV 94 09/23/2019     09/23/2019     Lab Results   Component Value Date    CREATININE 0.9 02/01/2023    CREATININE 0.9 02/01/2023    BUN 14 02/01/2023    BUN 14 02/01/2023     Lab Results   Component Value Date    PSA 1.1  02/01/2023    PSADIAG 2.1 03/11/2022    PSADIAG 0.96 07/06/2020    PSADIAG 1.5 07/16/2018         Imaging  -  Assessment and Plan:   Enlarged prostate without lower urinary tract symptoms (luts)    Erectile dysfunction, unspecified erectile dysfunction type    Refill sildenafil     See PCP and cards re BP     rtc 1 yr with psa

## 2023-05-26 ENCOUNTER — OFFICE VISIT (OUTPATIENT)
Dept: UROLOGY | Facility: CLINIC | Age: 68
End: 2023-05-26
Payer: MEDICARE

## 2023-05-26 VITALS
BODY MASS INDEX: 27.11 KG/M2 | SYSTOLIC BLOOD PRESSURE: 125 MMHG | HEART RATE: 60 BPM | WEIGHT: 189.38 LBS | HEIGHT: 70 IN | DIASTOLIC BLOOD PRESSURE: 81 MMHG

## 2023-05-26 DIAGNOSIS — N40.0 ENLARGED PROSTATE WITHOUT LOWER URINARY TRACT SYMPTOMS (LUTS): Primary | ICD-10-CM

## 2023-05-26 DIAGNOSIS — N52.9 ERECTILE DYSFUNCTION, UNSPECIFIED ERECTILE DYSFUNCTION TYPE: ICD-10-CM

## 2023-05-26 PROCEDURE — 3008F PR BODY MASS INDEX (BMI) DOCUMENTED: ICD-10-PCS | Mod: CPTII,S$GLB,, | Performed by: UROLOGY

## 2023-05-26 PROCEDURE — 3288F PR FALLS RISK ASSESSMENT DOCUMENTED: ICD-10-PCS | Mod: CPTII,S$GLB,, | Performed by: UROLOGY

## 2023-05-26 PROCEDURE — 3079F PR MOST RECENT DIASTOLIC BLOOD PRESSURE 80-89 MM HG: ICD-10-PCS | Mod: CPTII,S$GLB,, | Performed by: UROLOGY

## 2023-05-26 PROCEDURE — 3079F DIAST BP 80-89 MM HG: CPT | Mod: CPTII,S$GLB,, | Performed by: UROLOGY

## 2023-05-26 PROCEDURE — 1101F PR PT FALLS ASSESS DOC 0-1 FALLS W/OUT INJ PAST YR: ICD-10-PCS | Mod: CPTII,S$GLB,, | Performed by: UROLOGY

## 2023-05-26 PROCEDURE — 3074F SYST BP LT 130 MM HG: CPT | Mod: CPTII,S$GLB,, | Performed by: UROLOGY

## 2023-05-26 PROCEDURE — 3288F FALL RISK ASSESSMENT DOCD: CPT | Mod: CPTII,S$GLB,, | Performed by: UROLOGY

## 2023-05-26 PROCEDURE — 99214 OFFICE O/P EST MOD 30 MIN: CPT | Mod: S$GLB,,, | Performed by: UROLOGY

## 2023-05-26 PROCEDURE — 1126F AMNT PAIN NOTED NONE PRSNT: CPT | Mod: CPTII,S$GLB,, | Performed by: UROLOGY

## 2023-05-26 PROCEDURE — 3074F PR MOST RECENT SYSTOLIC BLOOD PRESSURE < 130 MM HG: ICD-10-PCS | Mod: CPTII,S$GLB,, | Performed by: UROLOGY

## 2023-05-26 PROCEDURE — 1101F PT FALLS ASSESS-DOCD LE1/YR: CPT | Mod: CPTII,S$GLB,, | Performed by: UROLOGY

## 2023-05-26 PROCEDURE — 99214 PR OFFICE/OUTPT VISIT, EST, LEVL IV, 30-39 MIN: ICD-10-PCS | Mod: S$GLB,,, | Performed by: UROLOGY

## 2023-05-26 PROCEDURE — 1159F PR MEDICATION LIST DOCUMENTED IN MEDICAL RECORD: ICD-10-PCS | Mod: CPTII,S$GLB,, | Performed by: UROLOGY

## 2023-05-26 PROCEDURE — 4010F PR ACE/ARB THEARPY RXD/TAKEN: ICD-10-PCS | Mod: CPTII,S$GLB,, | Performed by: UROLOGY

## 2023-05-26 PROCEDURE — 4010F ACE/ARB THERAPY RXD/TAKEN: CPT | Mod: CPTII,S$GLB,, | Performed by: UROLOGY

## 2023-05-26 PROCEDURE — 1126F PR PAIN SEVERITY QUANTIFIED, NO PAIN PRESENT: ICD-10-PCS | Mod: CPTII,S$GLB,, | Performed by: UROLOGY

## 2023-05-26 PROCEDURE — 3008F BODY MASS INDEX DOCD: CPT | Mod: CPTII,S$GLB,, | Performed by: UROLOGY

## 2023-05-26 PROCEDURE — 1159F MED LIST DOCD IN RCRD: CPT | Mod: CPTII,S$GLB,, | Performed by: UROLOGY

## 2023-05-26 RX ORDER — SILDENAFIL 100 MG/1
100 TABLET, FILM COATED ORAL DAILY PRN
Qty: 90 TABLET | Refills: 3 | Status: SHIPPED | OUTPATIENT
Start: 2023-05-26

## 2023-06-05 DIAGNOSIS — I49.9 CARDIAC ARRHYTHMIA, UNSPECIFIED CARDIAC ARRHYTHMIA TYPE: ICD-10-CM

## 2023-06-05 RX ORDER — ACEBUTOLOL HYDROCHLORIDE 200 MG/1
200 CAPSULE ORAL 2 TIMES DAILY
Qty: 180 CAPSULE | Refills: 3 | Status: SHIPPED | OUTPATIENT
Start: 2023-06-05

## 2023-06-05 NOTE — TELEPHONE ENCOUNTER
No care due was identified.  [facility] Embedded Care Due Messages. Reference number: [id number].   6/05/2023 8:49:39 AM CDT

## 2023-06-06 NOTE — TELEPHONE ENCOUNTER
Refill Decision Note   Giovanni Moncada  is requesting a refill authorization.  Brief Assessment and Rationale for Refill:  Approve     Medication Therapy Plan:         Alert overridden per protocol: Yes   Comments:     No Care Gaps recommended.     Note composed:7:37 PM 06/05/2023

## 2023-10-09 ENCOUNTER — LAB VISIT (OUTPATIENT)
Dept: LAB | Facility: HOSPITAL | Age: 68
End: 2023-10-09
Attending: INTERNAL MEDICINE
Payer: MEDICARE

## 2023-10-09 DIAGNOSIS — E78.2 HYPERLIPIDEMIA, MIXED: ICD-10-CM

## 2023-10-09 LAB
ALBUMIN SERPL BCP-MCNC: 3.8 G/DL (ref 3.5–5.2)
ALP SERPL-CCNC: 74 U/L (ref 55–135)
ALT SERPL W/O P-5'-P-CCNC: 13 U/L (ref 10–44)
ANION GAP SERPL CALC-SCNC: 6 MMOL/L (ref 8–16)
AST SERPL-CCNC: 20 U/L (ref 10–40)
BILIRUB SERPL-MCNC: 0.4 MG/DL (ref 0.1–1)
BUN SERPL-MCNC: 16 MG/DL (ref 8–23)
CALCIUM SERPL-MCNC: 9.8 MG/DL (ref 8.7–10.5)
CHLORIDE SERPL-SCNC: 106 MMOL/L (ref 95–110)
CHOLEST SERPL-MCNC: 167 MG/DL (ref 120–199)
CHOLEST/HDLC SERPL: 3.4 {RATIO} (ref 2–5)
CO2 SERPL-SCNC: 26 MMOL/L (ref 23–29)
CREAT SERPL-MCNC: 0.8 MG/DL (ref 0.5–1.4)
EST. GFR  (NO RACE VARIABLE): >60 ML/MIN/1.73 M^2
GLUCOSE SERPL-MCNC: 107 MG/DL (ref 70–110)
HDLC SERPL-MCNC: 49 MG/DL (ref 40–75)
HDLC SERPL: 29.3 % (ref 20–50)
LDLC SERPL CALC-MCNC: 91 MG/DL (ref 63–159)
NONHDLC SERPL-MCNC: 118 MG/DL
POTASSIUM SERPL-SCNC: 3.9 MMOL/L (ref 3.5–5.1)
PROT SERPL-MCNC: 6.8 G/DL (ref 6–8.4)
SODIUM SERPL-SCNC: 138 MMOL/L (ref 136–145)
TRIGL SERPL-MCNC: 135 MG/DL (ref 30–150)

## 2023-10-09 PROCEDURE — 80061 LIPID PANEL: CPT | Performed by: INTERNAL MEDICINE

## 2023-10-09 PROCEDURE — 80053 COMPREHEN METABOLIC PANEL: CPT | Performed by: INTERNAL MEDICINE

## 2023-10-09 PROCEDURE — 36415 COLL VENOUS BLD VENIPUNCTURE: CPT | Performed by: INTERNAL MEDICINE

## 2023-10-11 ENCOUNTER — OFFICE VISIT (OUTPATIENT)
Dept: PRIMARY CARE CLINIC | Facility: CLINIC | Age: 68
End: 2023-10-11
Payer: MEDICARE

## 2023-10-11 ENCOUNTER — TELEPHONE (OUTPATIENT)
Dept: PRIMARY CARE CLINIC | Facility: CLINIC | Age: 68
End: 2023-10-11

## 2023-10-11 VITALS
HEIGHT: 70 IN | BODY MASS INDEX: 26.74 KG/M2 | DIASTOLIC BLOOD PRESSURE: 70 MMHG | WEIGHT: 186.75 LBS | OXYGEN SATURATION: 98 % | HEART RATE: 59 BPM | SYSTOLIC BLOOD PRESSURE: 136 MMHG

## 2023-10-11 DIAGNOSIS — Z11.3 SCREEN FOR STD (SEXUALLY TRANSMITTED DISEASE): ICD-10-CM

## 2023-10-11 DIAGNOSIS — Z85.89 HISTORY OF SQUAMOUS CELL CARCINOMA: ICD-10-CM

## 2023-10-11 DIAGNOSIS — I10 ESSENTIAL HYPERTENSION: Primary | ICD-10-CM

## 2023-10-11 DIAGNOSIS — Z12.5 SCREENING FOR MALIGNANT NEOPLASM OF PROSTATE: ICD-10-CM

## 2023-10-11 DIAGNOSIS — N40.0 BENIGN PROSTATIC HYPERPLASIA WITHOUT LOWER URINARY TRACT SYMPTOMS: ICD-10-CM

## 2023-10-11 DIAGNOSIS — E78.2 HYPERLIPIDEMIA, MIXED: ICD-10-CM

## 2023-10-11 DIAGNOSIS — N52.9 ERECTILE DYSFUNCTION, UNSPECIFIED ERECTILE DYSFUNCTION TYPE: ICD-10-CM

## 2023-10-11 DIAGNOSIS — K21.9 GERD WITHOUT ESOPHAGITIS: ICD-10-CM

## 2023-10-11 DIAGNOSIS — L21.9 SEBORRHEIC DERMATITIS: Primary | ICD-10-CM

## 2023-10-11 DIAGNOSIS — R00.2 PALPITATIONS: ICD-10-CM

## 2023-10-11 PROCEDURE — 3075F SYST BP GE 130 - 139MM HG: CPT | Mod: CPTII,S$GLB,, | Performed by: INTERNAL MEDICINE

## 2023-10-11 PROCEDURE — 99214 OFFICE O/P EST MOD 30 MIN: CPT | Mod: S$GLB,,, | Performed by: INTERNAL MEDICINE

## 2023-10-11 PROCEDURE — 3075F PR MOST RECENT SYSTOLIC BLOOD PRESS GE 130-139MM HG: ICD-10-PCS | Mod: CPTII,S$GLB,, | Performed by: INTERNAL MEDICINE

## 2023-10-11 PROCEDURE — 1159F MED LIST DOCD IN RCRD: CPT | Mod: CPTII,S$GLB,, | Performed by: INTERNAL MEDICINE

## 2023-10-11 PROCEDURE — 3008F PR BODY MASS INDEX (BMI) DOCUMENTED: ICD-10-PCS | Mod: CPTII,S$GLB,, | Performed by: INTERNAL MEDICINE

## 2023-10-11 PROCEDURE — 99999 PR PBB SHADOW E&M-EST. PATIENT-LVL V: CPT | Mod: PBBFAC,,, | Performed by: INTERNAL MEDICINE

## 2023-10-11 PROCEDURE — 1126F PR PAIN SEVERITY QUANTIFIED, NO PAIN PRESENT: ICD-10-PCS | Mod: CPTII,S$GLB,, | Performed by: INTERNAL MEDICINE

## 2023-10-11 PROCEDURE — 3078F DIAST BP <80 MM HG: CPT | Mod: CPTII,S$GLB,, | Performed by: INTERNAL MEDICINE

## 2023-10-11 PROCEDURE — 99999 PR PBB SHADOW E&M-EST. PATIENT-LVL V: ICD-10-PCS | Mod: PBBFAC,,, | Performed by: INTERNAL MEDICINE

## 2023-10-11 PROCEDURE — 3078F PR MOST RECENT DIASTOLIC BLOOD PRESSURE < 80 MM HG: ICD-10-PCS | Mod: CPTII,S$GLB,, | Performed by: INTERNAL MEDICINE

## 2023-10-11 PROCEDURE — 1159F PR MEDICATION LIST DOCUMENTED IN MEDICAL RECORD: ICD-10-PCS | Mod: CPTII,S$GLB,, | Performed by: INTERNAL MEDICINE

## 2023-10-11 PROCEDURE — 99214 PR OFFICE/OUTPT VISIT, EST, LEVL IV, 30-39 MIN: ICD-10-PCS | Mod: S$GLB,,, | Performed by: INTERNAL MEDICINE

## 2023-10-11 PROCEDURE — 4010F PR ACE/ARB THEARPY RXD/TAKEN: ICD-10-PCS | Mod: CPTII,S$GLB,, | Performed by: INTERNAL MEDICINE

## 2023-10-11 PROCEDURE — 1126F AMNT PAIN NOTED NONE PRSNT: CPT | Mod: CPTII,S$GLB,, | Performed by: INTERNAL MEDICINE

## 2023-10-11 PROCEDURE — 4010F ACE/ARB THERAPY RXD/TAKEN: CPT | Mod: CPTII,S$GLB,, | Performed by: INTERNAL MEDICINE

## 2023-10-11 PROCEDURE — 3008F BODY MASS INDEX DOCD: CPT | Mod: CPTII,S$GLB,, | Performed by: INTERNAL MEDICINE

## 2023-10-11 RX ORDER — CLOBETASOL PROPIONATE 0.46 MG/ML
SOLUTION TOPICAL
Qty: 50 ML | Refills: 5 | Status: SHIPPED | OUTPATIENT
Start: 2023-10-11

## 2023-10-11 RX ORDER — KETOCONAZOLE 20 MG/ML
SHAMPOO, SUSPENSION TOPICAL
Qty: 120 ML | Refills: 5 | Status: SHIPPED | OUTPATIENT
Start: 2023-10-11

## 2023-10-11 NOTE — TELEPHONE ENCOUNTER
Please let pt know I discussed with Dr. Ramos and sent in two topical medications that we'd like him to start on for scalp itchiness

## 2023-10-11 NOTE — PROGRESS NOTES
[facility] Note    Chief Complaint      Chief Complaint   Patient presents with    Follow-up     6 month       History of Present Illness      Giovanni Moncada is a 68 y.o. male with chronic conditions of HTN, HLD, BPH, GERD, ED, hx of squamous cell carcinoma of neck who presents today for: follow up chronic conditions.  Reports joint stiffness and muscle aches after returning from [facility].    HTN: BP at goal on benicar, amlodipine, acebutolol, doxazosin.  HLD: controlled on atorvastatin.  LDL 91, slightly elevated from previous.  Sees Dr. Brown.  ED: Sees Dr. Aquino, urology.  On viagra  GERD:  Sees Dr. Garcia. Controlled on omeprazole.  No new or worsening symptoms.   Cardiac arrhythmia: Sees Dr. Brown.  Previous diagnosed during stress test.  On acebutolol for rate control.  BPH: Sees Dr. Aquino.  Controlled on doxazosin.  No new or worsening symptoms  Hx of squamous cell carcinoma neck s/p resection: no new changes.  Flu shot declines.  Td 2005.  Pneumonia vaccines declines.  Shingles vaccine declines.  COVID vaccine UTD.  Cscope 2021, Dr. Mendes, polyps, 5 yr interval.       Past Medical History:  History reviewed. No pertinent past medical history.    Past Surgical History:   has a past surgical history that includes Neck surgery; Cataract extraction, bilateral (02/2020);  colonoscopy (01/07/2016); colonoscopy (01/26/2021); Eye surgery (2020); Hernia repair (2020); Tonsillectomy (1964); and Cholecystectomy (2020).    Family History:  family history includes Cancer in his mother; Heart disease in his father.     Social History:  Social History     Tobacco Use    Smoking status: Never    Smokeless tobacco: Never   Substance Use Topics    Alcohol use: Yes     Alcohol/week: 8.0 standard drinks of alcohol     Types: 4 Glasses of wine, 4 Drinks containing 0.5 oz of alcohol per week    Drug use: No       I personally reviewed all past medical, surgical, social and family  history.    Review of Systems   Constitutional:  Negative for chills, fever and malaise/fatigue.   Respiratory:  Negative for shortness of breath.    Cardiovascular:  Negative for chest pain.   Gastrointestinal:  Negative for constipation, diarrhea, nausea and vomiting.   Skin:  Negative for rash.   Neurological:  Negative for weakness.   All other systems reviewed and are negative.       Medications:  Outpatient Encounter Medications as of 10/11/2023   Medication Sig Dispense Refill    a-cysteine/arg zinc/glut/mv-mn (L GLUTAMINE-N ACET-ARG-VIT-MIN ORAL) Take by mouth.      acebutoloL (SECTRAL) 200 MG capsule Take 1 capsule (200 mg total) by mouth 2 (two) times daily. 180 capsule 3    amLODIPine (NORVASC) 5 MG tablet Take 1 tablet (5 mg total) by mouth every morning. 90 tablet 3    ascorbic acid, vitamin C, (VITAMIN C) 1000 MG tablet Take 1,000 mg by mouth.      aspirin (ECOTRIN) 81 MG EC tablet Take 81 mg by mouth once daily.      atorvastatin (LIPITOR) 10 MG tablet Take 1 tablet (10 mg total) by mouth nightly. 90 tablet 3    bacillus coagulans-inulin 1 billion-250 cell-mg Cap Take by mouth.      cholecalciferol, vitamin D3, 5,000 unit Tab Take by mouth.      ciclopirox (LOPROX) 0.77 % Crea Apply topically 2 (two) times daily. Use for 2-4 weeks to rash on buttocks and to rash on feet when needed until resolved 90 g 1    coenzyme Q10 100 mg capsule Take 100 mg by mouth once daily.      cyanocobalamin (VITAMIN B-12) 1000 MCG tablet Take 100 mcg by mouth once daily.      doxazosin (CARDURA) 1 MG tablet Take 1 tablet (1 mg total) by mouth nightly. 90 tablet 3    ergocalciferol (ERGOCALCIFEROL) 50,000 unit Cap Take 50,000 Units by mouth every 7 days.      fluticasone propionate (FLONASE) 50 mcg/actuation nasal spray 1 spray (50 mcg total) by Each Nostril route once daily. 48 g 3    glucosamine-chondroit-vit C-Mn 500-400 mg capsule Take by mouth.      GLUCOSAMINE/CHONDR MARI A SOD (OSTEO BI-FLEX ORAL) Take 1,500 mg by  "mouth.      magnesium oxide (MAG-OX) 400 mg (241.3 mg magnesium) tablet Take 1 twice a day for 1 month, then 1 daily; if diarrhea occurs, decrease dose  0    olmesartan (BENICAR) 20 MG tablet TAKE 1 TABLET BY MOUTH EVERY EVENING 90 tablet 3    omega-3 acid ethyl esters (LOVAZA) 1 gram capsule Take 2 capsules (2 g total) by mouth 2 (two) times daily. 360 capsule 3    omeprazole (PRILOSEC) 20 MG capsule Take 1 capsule (20 mg total) by mouth once daily. 90 capsule 3    sildenafiL (VIAGRA) 100 MG tablet Take 1 tablet (100 mg total) by mouth daily as needed for Erectile Dysfunction. 90 tablet 3    tadalafiL (CIALIS) 20 MG Tab Take 1 tablet (20 mg total) by mouth daily as needed. 30 tablet 11    vitamin A acetate 10,000 unit Subl Place 2,400 mcg under the tongue.      zinc acetate 50 mg (zinc) Cap Take 50 mg by mouth.      zinc gluconate 50 mg tablet Take 50 mg by mouth once daily.       No facility-administered encounter medications on file as of 10/11/2023.       Allergies:  Review of patient's allergies indicates:   Allergen Reactions    Lamisil [terbinafine] Hives       Health Maintenance:  Immunization History   Administered Date(s) Administered    COVID-19, MRNA, LN-S, PF (Pfizer) (Purple Cap) 07/22/2021, 08/12/2021      Health Maintenance   Topic Date Due    Hepatitis C Screening  Never done    TETANUS VACCINE  Never done    Shingles Vaccine (1 of 2) Never done    Lipid Panel  10/09/2028    Colorectal Cancer Screening  01/26/2031        Physical Exam      Vital Signs  Pulse: (!) 59  SpO2: 98 %  BP: 136/70  BP Location: Right arm  Patient Position: Sitting  Pain Score: 0-No pain  Height and Weight  Height: 5' 10" (177.8 cm)  Weight: 84.7 kg (186 lb 11.7 oz)  BSA (Calculated - sq m): 2.05 sq meters  BMI (Calculated): 26.8  Weight in (lb) to have BMI = 25: 173.9]    Physical Exam  Vitals reviewed.   Constitutional:       Appearance: He is well-developed.   HENT:      Head: Normocephalic and atraumatic.      Right Ear: " External ear normal.      Left Ear: External ear normal.   Cardiovascular:      Rate and Rhythm: Normal rate and regular rhythm.      Heart sounds: Normal heart sounds. No murmur heard.  Pulmonary:      Effort: Pulmonary effort is normal.      Breath sounds: Normal breath sounds. No wheezing or rales.   Abdominal:      General: Bowel sounds are normal.      Palpations: Abdomen is soft.          Laboratory:  CBC:      CMP:  Recent Labs   Lab 09/28/22  0945 02/01/23  0950 10/09/23  0905   Glucose 98 102  102 107   Calcium 10.1 10.0  10.0 9.8   Albumin 4.4 4.3  4.3 3.8   Total Protein 6.6 7.2  7.2 6.8   Sodium 137 138  138 138   Potassium 4.1 4.4  4.4 3.9   CO2 25 24  24 26   Chloride 105 106  106 106   BUN 16 14  14 16   Alkaline Phosphatase 78 85  85 74   ALT 18 17  17 13   AST 20 22  22 20   Total Bilirubin 0.6 0.7  0.7 0.4     URINALYSIS:  Recent Labs   Lab 02/01/23  0941   Color, UA Yellow   Specific Gravity, UA 1.025   pH, UA 6.0   Protein, UA Negative   Nitrite, UA Negative   Leukocytes, UA Negative      LIPIDS:  Recent Labs   Lab 09/28/22  0945 02/01/23  0950 10/09/23  0905   HDL 48 52  52 49   Cholesterol 137 160  160 167   Triglycerides 122 121  121 135   LDL Cholesterol 64.6 83.8  83.8 91.0   HDL/Cholesterol Ratio 35.0 32.5  32.5 29.3   Non-HDL Cholesterol 89 108  108 118   Total Cholesterol/HDL Ratio 2.9 3.1  3.1 3.4     TSH:      A1C:        Assessment/Plan     Giovanni Moncada is a 68 y.o.male with:    1. Essential hypertension  Continue current meds.    2. Hyperlipidemia, mixed  - Comprehensive Metabolic Panel; Future  - Lipid Panel; Future  Continue current meds.    3. Benign prostatic hyperplasia without lower urinary tract symptoms  Continue current meds.  F/U with urology.   4. GERD without esophagitis  - CBC Auto Differential; Future  Continue current meds.    5. Erectile dysfunction, unspecified erectile dysfunction type  Continue current meds.    6. Palpitations  Continue  current meds.  F/U with cardiology.   7. History of squamous cell carcinoma  F/U with ENT   8. Screening for malignant neoplasm of prostate  - PSA, Screening; Future    9. Screen for STD (sexually transmitted disease)  - RPR; Future  - HIV 1/2 Ag/Ab (4th Gen); Future  - HSV 1 & 2, IgG; Future  - Hepatitis Panel, Acute; Future       Chronic conditions status updated as per HPI.  Other than changes above, cont current medications and maintain follow up with specialists.  Follow up in about 6 months (around 4/11/2024) for Follow up visit.    Future Appointments   Date Time Provider Department Center   4/16/2024  9:00 AM LAB, [facility] [place]   4/17/2024  9:30 AM Arnel Flaherty MD [facility] [place]   5/17/2024 10:00 AM LAB, [place] [facility] [place]   5/24/2024  1:15 PM Arnav Aquino MD [facility] UROLOGY [facility]       Arnel Flaherty MD  [facility]

## 2023-10-11 NOTE — TELEPHONE ENCOUNTER
----- Message from Yo Ramos MD sent at 10/11/2023  1:00 PM CDT -----  Regarding: RE: Scalp pruritus  Brayan Flaherty,    I didn't mention it in my note and at this point I don't remember exactly. If it is itchy and flaky on the scalp then it is most likely seborrheic dermatitis and I usually start with 2% ketoconazole shampoo TIW and clobetasol 0.05% solution daily for 2 weeks then TIW PRN. Would you like me to send it or would you like to send the prescription?    Thanks,  Yo  ----- Message -----  From: Arnel Flaherty MD  Sent: 10/11/2023   9:59 AM CDT  To: Yo Ramos MD  Subject: Scalp pruritus                                   Yo,   I'm seeing Mr. Basurto today in clinic.  He mentioned yall talked about his scalp pruritus and flaking skin at your last visit with him.  He thought you were going to send in a topical cream or solution to treat but I don't see it in your med list.  Any idea what you were considering?    Thanks,  Arnel Flaherty

## 2023-11-07 ENCOUNTER — PATIENT MESSAGE (OUTPATIENT)
Dept: PRIMARY CARE CLINIC | Facility: CLINIC | Age: 68
End: 2023-11-07
Payer: MEDICARE

## 2023-11-07 DIAGNOSIS — G89.29 CHRONIC BILATERAL LOW BACK PAIN WITHOUT SCIATICA: ICD-10-CM

## 2023-11-07 DIAGNOSIS — R13.10 DYSPHAGIA, UNSPECIFIED TYPE: Primary | ICD-10-CM

## 2023-11-07 DIAGNOSIS — M54.50 CHRONIC BILATERAL LOW BACK PAIN WITHOUT SCIATICA: ICD-10-CM

## 2023-11-08 ENCOUNTER — PATIENT MESSAGE (OUTPATIENT)
Dept: GASTROENTEROLOGY | Facility: CLINIC | Age: 68
End: 2023-11-08
Payer: MEDICARE

## 2023-11-13 ENCOUNTER — PATIENT MESSAGE (OUTPATIENT)
Dept: NEUROSURGERY | Facility: CLINIC | Age: 68
End: 2023-11-13
Payer: MEDICARE

## 2023-11-13 ENCOUNTER — TELEPHONE (OUTPATIENT)
Dept: NEUROSURGERY | Facility: CLINIC | Age: 68
End: 2023-11-13
Payer: MEDICARE

## 2023-11-13 NOTE — TELEPHONE ENCOUNTER
Spoke to patient. He stated that back pain feels to more muscle related and described it as stiffness. He said he has recently had outside imaging done of his neck, but nothing recently of his lower back where the pain is located. We confirmed time and date for appt with Barb for work up.           ----- Message -----  From: More Iniguez  Sent: 11/8/2023   8:13 AM CST  To: [facility] Staff    Dr. Arnel Flaherty has put in a referral for patient to be scheduled with Dr. Yeager for Back and Spine. Please call patient to schedule.

## 2023-11-14 NOTE — PROGRESS NOTES
[facility] Consultation Note    Reason for Consult:  The encounter diagnosis was Dysphagia, unspecified type.    PCP:   Arnel Flaherty   [address] / [place] [address]    Referring MD:  Arnel Flaherty Md  [address]  [address]  [place] [address]    Initial History of Present Illness (HPI):  This is a 68 y.o. 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    Surgical History:  has a past surgical history that includes Neck surgery; Cataract extraction, bilateral (02/2020);  colonoscopy (01/07/2016); colonoscopy (01/26/2021); Eye surgery (2020); Hernia repair (2020); Tonsillectomy (1964); and Cholecystectomy (2020).    Family History: family history includes Cancer in his mother; Heart disease in his father..     Social History:  reports that he has never smoked. He has never used smokeless tobacco. He reports current alcohol use of about 8.0 standard drinks of alcohol per week. He reports that he does not use drugs.    Review of patient's allergies indicates:   Allergen Reactions    Lamisil [terbinafine] Hives       Medication List with Changes/Refills   Current Medications    A-CYSTEINE/ARG ZINC/GLUT/MV-MN (L GLUTAMINE-N ACET-ARG-VIT-MIN ORAL)    Take by mouth.    ACEBUTOLOL (SECTRAL) 200 MG CAPSULE    Take 1 capsule (200 mg total) by mouth 2 (two)  times daily.    AMLODIPINE (NORVASC) 5 MG TABLET    Take 1 tablet (5 mg total) by mouth every morning.    ASCORBIC ACID, VITAMIN C, (VITAMIN C) 1000 MG TABLET    Take 1,000 mg by mouth.    ASPIRIN (ECOTRIN) 81 MG EC TABLET    Take 81 mg by mouth once daily.    ATORVASTATIN (LIPITOR) 10 MG TABLET    Take 1 tablet (10 mg total) by mouth nightly.    BACILLUS COAGULANS-INULIN 1 BILLION-250 CELL-MG CAP    Take by mouth.    CHOLECALCIFEROL, VITAMIN D3, 5,000 UNIT TAB    Take by mouth.    CICLOPIROX (LOPROX) 0.77 % CREA    Apply topically 2 (two) times daily. Use for 2-4 weeks to rash on buttocks and to rash on feet when needed until resolved    CLOBETASOL (TEMOVATE) 0.05 % EXTERNAL SOLUTION    Apply to affected area on scalp daily for 2 weeks, then three times weekly    COENZYME Q10 100 MG CAPSULE    Take 100 mg by mouth once daily.    CYANOCOBALAMIN (VITAMIN B-12) 1000 MCG TABLET    Take 100 mcg by mouth once daily.    DOXAZOSIN (CARDURA) 1 MG TABLET    Take 1 tablet (1 mg total) by mouth nightly.    ERGOCALCIFEROL (ERGOCALCIFEROL) 50,000 UNIT CAP    Take 50,000 Units by mouth every 7 days.    FLUTICASONE PROPIONATE (FLONASE) 50 MCG/ACTUATION NASAL SPRAY    1 spray (50 mcg total) by Each Nostril route once daily.    GLUCOSAMINE-CHONDROIT-VIT C--400 MG CAPSULE    Take by mouth.    GLUCOSAMINE/CHONDR MARI A SOD (OSTEO BI-FLEX ORAL)    Take 1,500 mg by mouth.    KETOCONAZOLE (NIZORAL) 2 % SHAMPOO    Apply topically 3 (three) times a week.    MAGNESIUM OXIDE (MAG-OX) 400 MG (241.3 MG MAGNESIUM) TABLET    Take 1 twice a day for 1 month, then 1 daily; if diarrhea occurs, decrease dose    OLMESARTAN (BENICAR) 20 MG TABLET    TAKE 1 TABLET BY MOUTH EVERY EVENING    OMEGA-3 ACID ETHYL ESTERS (LOVAZA) 1 GRAM CAPSULE    Take 2 capsules (2 g total) by mouth 2 (two) times daily.    OMEPRAZOLE (PRILOSEC) 20 MG CAPSULE    Take 1 capsule (20 mg total) by mouth once daily.    SILDENAFIL (VIAGRA) 100 MG TABLET    Take 1 tablet (100 mg  "total) by mouth daily as needed for Erectile Dysfunction.    TADALAFIL (CIALIS) 20 MG TAB    Take 1 tablet (20 mg total) by mouth daily as needed.    VITAMIN A ACETATE 10,000 UNIT SUBL    Place 2,400 mcg under the tongue.    ZINC ACETATE 50 MG (ZINC) CAP    Take 50 mg by mouth.    ZINC GLUCONATE 50 MG TABLET    Take 50 mg by mouth once daily.         Objective Findings:    Vital Signs:  Wt 84.7 kg (186 lb 11.7 oz)   BMI 26.79 kg/m²   Body mass index is 26.79 kg/m².    Physical Exam:  General Appearance: Well appearing in no acute distress  Head:   Normocephalic, without obvious abnormality  Eyes:    No scleral icterus, EOMI  ENT: Neck supple, Lips, mucosa, and tongue normal; teeth and gums normal  Lungs: CTA bilaterally in anterior and posterior fields, no wheezes, no crackles.  Heart:  Regular rate and rhythm, S1, S2 normal, no murmurs heard  Abdomen: Soft, non tender, non distended with positive bowel sounds in all four quadrants. No hepatosplenomegaly, ascites, or mass  Extremities: 2+ pulses, no clubbing, cyanosis or edema  Skin: No rash  Neurologic: CN II-XII intact      Labs:  Lab Results   Component Value Date    WBC 6.25 09/23/2019    HGB 14.1 09/23/2019    HCT 44.3 09/23/2019     09/23/2019    CHOL 167 10/09/2023    TRIG 135 10/09/2023    HDL 49 10/09/2023    ALT 13 10/09/2023    AST 20 10/09/2023     10/09/2023    K 3.9 10/09/2023     10/09/2023    CREATININE 0.8 10/09/2023    BUN 16 10/09/2023    CO2 26 10/09/2023    TSH 2.575 09/23/2019    PSA 1.1 02/01/2023       No results found for: "HPYLORINTERP"  No results found for: "HPYLORIANTIG"        Imaging:  Esophagram:  The patient swallowed a mixture of air crystals and barium and fluoroscopic images of the cervical and thoracic esophagus were obtained.  There was normal propulsion of the barium bolus initiated during swallowing.  There is spillage into the vallecula and piriform sinuses without tracheal penetration or aspiration.  The " cervical esophagus is normal in thickness. No stricture or obstruction is noted.  There is moderate esophageal dysmotility with multiple tertiary contractions present. There is also reflux present to the thoracic inlet.  With the patient in a prone position, barium pools within the upper thoracic esophagus and without the benefit of gravity, the barium bolus does not propel into the stomach.       Endoscopy:          Assessment:  1. Dysphagia, unspecified type           Recommendations:  1. EGD with dilation given esophagram findings  2. UTD on colons    No follow-ups on file.      Order summary:         Thank you so much for allowing me to participate in the care of Giovanni Boone MD

## 2023-11-15 ENCOUNTER — OFFICE VISIT (OUTPATIENT)
Dept: GASTROENTEROLOGY | Facility: CLINIC | Age: 68
End: 2023-11-15
Payer: MEDICARE

## 2023-11-15 ENCOUNTER — PATIENT MESSAGE (OUTPATIENT)
Dept: GASTROENTEROLOGY | Facility: CLINIC | Age: 68
End: 2023-11-15

## 2023-11-15 ENCOUNTER — TELEPHONE (OUTPATIENT)
Dept: ENDOSCOPY | Facility: HOSPITAL | Age: 68
End: 2023-11-15
Payer: MEDICARE

## 2023-11-15 ENCOUNTER — OFFICE VISIT (OUTPATIENT)
Dept: NEUROSURGERY | Facility: CLINIC | Age: 68
End: 2023-11-15
Payer: MEDICARE

## 2023-11-15 VITALS
BODY MASS INDEX: 26.05 KG/M2 | SYSTOLIC BLOOD PRESSURE: 131 MMHG | HEART RATE: 52 BPM | HEIGHT: 70 IN | BODY MASS INDEX: 26.74 KG/M2 | WEIGHT: 182 LBS | WEIGHT: 186.75 LBS | DIASTOLIC BLOOD PRESSURE: 83 MMHG | HEIGHT: 70 IN

## 2023-11-15 VITALS
WEIGHT: 186 LBS | HEART RATE: 52 BPM | DIASTOLIC BLOOD PRESSURE: 83 MMHG | BODY MASS INDEX: 26.69 KG/M2 | SYSTOLIC BLOOD PRESSURE: 131 MMHG

## 2023-11-15 DIAGNOSIS — R13.10 DYSPHAGIA, UNSPECIFIED TYPE: Primary | ICD-10-CM

## 2023-11-15 DIAGNOSIS — R13.10 DYSPHAGIA, UNSPECIFIED TYPE: ICD-10-CM

## 2023-11-15 DIAGNOSIS — M54.16 LUMBAR RADICULOPATHY: ICD-10-CM

## 2023-11-15 DIAGNOSIS — M54.9 DORSALGIA, UNSPECIFIED: ICD-10-CM

## 2023-11-15 DIAGNOSIS — M48.02 SPINAL STENOSIS, CERVICAL REGION: Primary | ICD-10-CM

## 2023-11-15 DIAGNOSIS — Z98.1 HX OF FUSION OF CERVICAL SPINE: ICD-10-CM

## 2023-11-15 PROCEDURE — 99204 OFFICE O/P NEW MOD 45 MIN: CPT | Mod: S$GLB,,, | Performed by: NURSE PRACTITIONER

## 2023-11-15 PROCEDURE — 1101F PT FALLS ASSESS-DOCD LE1/YR: CPT | Mod: CPTII,S$GLB,, | Performed by: NURSE PRACTITIONER

## 2023-11-15 PROCEDURE — 1101F PR PT FALLS ASSESS DOC 0-1 FALLS W/OUT INJ PAST YR: ICD-10-PCS | Mod: CPTII,S$GLB,, | Performed by: NURSE PRACTITIONER

## 2023-11-15 PROCEDURE — 3075F PR MOST RECENT SYSTOLIC BLOOD PRESS GE 130-139MM HG: ICD-10-PCS | Mod: CPTII,S$GLB,, | Performed by: NURSE PRACTITIONER

## 2023-11-15 PROCEDURE — 99999 PR PBB SHADOW E&M-EST. PATIENT-LVL III: ICD-10-PCS | Mod: PBBFAC,,, | Performed by: NURSE PRACTITIONER

## 2023-11-15 PROCEDURE — 1160F PR REVIEW ALL MEDS BY PRESCRIBER/CLIN PHARMACIST DOCUMENTED: ICD-10-PCS | Mod: CPTII,S$GLB,, | Performed by: NURSE PRACTITIONER

## 2023-11-15 PROCEDURE — 3008F PR BODY MASS INDEX (BMI) DOCUMENTED: ICD-10-PCS | Mod: CPTII,S$GLB,, | Performed by: INTERNAL MEDICINE

## 2023-11-15 PROCEDURE — 3288F PR FALLS RISK ASSESSMENT DOCUMENTED: ICD-10-PCS | Mod: CPTII,S$GLB,, | Performed by: NURSE PRACTITIONER

## 2023-11-15 PROCEDURE — 1159F PR MEDICATION LIST DOCUMENTED IN MEDICAL RECORD: ICD-10-PCS | Mod: CPTII,S$GLB,, | Performed by: NURSE PRACTITIONER

## 2023-11-15 PROCEDURE — 99204 OFFICE O/P NEW MOD 45 MIN: CPT | Mod: S$GLB,,, | Performed by: INTERNAL MEDICINE

## 2023-11-15 PROCEDURE — 4010F PR ACE/ARB THEARPY RXD/TAKEN: ICD-10-PCS | Mod: CPTII,S$GLB,, | Performed by: INTERNAL MEDICINE

## 2023-11-15 PROCEDURE — 99999 PR PBB SHADOW E&M-EST. PATIENT-LVL III: CPT | Mod: PBBFAC,,, | Performed by: NURSE PRACTITIONER

## 2023-11-15 PROCEDURE — 99204 PR OFFICE/OUTPT VISIT, NEW, LEVL IV, 45-59 MIN: ICD-10-PCS | Mod: S$GLB,,, | Performed by: NURSE PRACTITIONER

## 2023-11-15 PROCEDURE — 1159F MED LIST DOCD IN RCRD: CPT | Mod: CPTII,S$GLB,, | Performed by: NURSE PRACTITIONER

## 2023-11-15 PROCEDURE — 3288F FALL RISK ASSESSMENT DOCD: CPT | Mod: CPTII,S$GLB,, | Performed by: NURSE PRACTITIONER

## 2023-11-15 PROCEDURE — 99204 PR OFFICE/OUTPT VISIT, NEW, LEVL IV, 45-59 MIN: ICD-10-PCS | Mod: S$GLB,,, | Performed by: INTERNAL MEDICINE

## 2023-11-15 PROCEDURE — 1125F AMNT PAIN NOTED PAIN PRSNT: CPT | Mod: CPTII,S$GLB,, | Performed by: NURSE PRACTITIONER

## 2023-11-15 PROCEDURE — 3008F BODY MASS INDEX DOCD: CPT | Mod: CPTII,S$GLB,, | Performed by: INTERNAL MEDICINE

## 2023-11-15 PROCEDURE — 1160F RVW MEDS BY RX/DR IN RCRD: CPT | Mod: CPTII,S$GLB,, | Performed by: NURSE PRACTITIONER

## 2023-11-15 PROCEDURE — 3008F BODY MASS INDEX DOCD: CPT | Mod: CPTII,S$GLB,, | Performed by: NURSE PRACTITIONER

## 2023-11-15 PROCEDURE — 3075F SYST BP GE 130 - 139MM HG: CPT | Mod: CPTII,S$GLB,, | Performed by: NURSE PRACTITIONER

## 2023-11-15 PROCEDURE — 99999 PR PBB SHADOW E&M-EST. PATIENT-LVL I: CPT | Mod: PBBFAC,,, | Performed by: INTERNAL MEDICINE

## 2023-11-15 PROCEDURE — 1125F PR PAIN SEVERITY QUANTIFIED, PAIN PRESENT: ICD-10-PCS | Mod: CPTII,S$GLB,, | Performed by: NURSE PRACTITIONER

## 2023-11-15 PROCEDURE — 4010F PR ACE/ARB THEARPY RXD/TAKEN: ICD-10-PCS | Mod: CPTII,S$GLB,, | Performed by: NURSE PRACTITIONER

## 2023-11-15 PROCEDURE — 3079F PR MOST RECENT DIASTOLIC BLOOD PRESSURE 80-89 MM HG: ICD-10-PCS | Mod: CPTII,S$GLB,, | Performed by: NURSE PRACTITIONER

## 2023-11-15 PROCEDURE — 4010F ACE/ARB THERAPY RXD/TAKEN: CPT | Mod: CPTII,S$GLB,, | Performed by: INTERNAL MEDICINE

## 2023-11-15 PROCEDURE — 3008F PR BODY MASS INDEX (BMI) DOCUMENTED: ICD-10-PCS | Mod: CPTII,S$GLB,, | Performed by: NURSE PRACTITIONER

## 2023-11-15 PROCEDURE — 99999 PR PBB SHADOW E&M-EST. PATIENT-LVL I: ICD-10-PCS | Mod: PBBFAC,,, | Performed by: INTERNAL MEDICINE

## 2023-11-15 PROCEDURE — 3079F DIAST BP 80-89 MM HG: CPT | Mod: CPTII,S$GLB,, | Performed by: NURSE PRACTITIONER

## 2023-11-15 PROCEDURE — 4010F ACE/ARB THERAPY RXD/TAKEN: CPT | Mod: CPTII,S$GLB,, | Performed by: NURSE PRACTITIONER

## 2023-11-15 RX ORDER — VARDENAFIL HYDROCHLORIDE 20 MG/1
TABLET ORAL
COMMUNITY

## 2023-11-15 NOTE — PROGRESS NOTES
"GENERAL GI PATIENT INTAKE:    COVID symptoms in the last 7 days (runny nose, sore throat, congestion, cough, fever): No  PCP: Arnel Flaherty  If not PCP-  number given to establish [phone]: No    ALLERGIES REVIEWED:  Yes    CHIEF COMPLAINT:    Chief Complaint   Patient presents with    Initial Visit    Dysphagia       VITAL SIGNS:  /83   Pulse (!) 52   Ht 5' 10" (1.778 m)   Wt 84.7 kg (186 lb 11.7 oz)   BMI 26.79 kg/m²      Change in medical, surgical, family or social history: No      REVIEWED MEDICATION LIST RECONCILED INCLUDING ABOVE MEDS:  Yes     "

## 2023-11-15 NOTE — PROGRESS NOTES
"Neurosurgery  History & Physical    SUBJECTIVE:     History of Present Illness: Giovanni Moncada is a 68 y.o. male being seen in clinic today to discuss with Dr. Yeager concerns with persistent neck and back pain. The patient has a hx of 2 level cervical fusion with Dr. Diaz in 2019. He continues to struggle with the neck and back pain despite surgery, massages, and injections. He is apprehensive about surgery but would like to further evaluate the cause of his pain.     Regarding his back pain, he describes the pain as constant and aching across the low back. Denies radicular complaints. Rates the pain as a 2/10. Aggravating factors include standing, bending, and walking. Alleviating factors include stretching. Denies weakness, numbness or tingling, b/b dysfunction, saddle anesthesia, or gait instability. Reports intermittent episodes of noticing his right foot "catch" as he walks and that it is hard to " his feet".     Review of patient's allergies indicates:   Allergen Reactions    Lamisil [terbinafine] Hives       Current Outpatient Medications   Medication Sig Dispense Refill    a-cysteine/arg zinc/glut/mv-mn (L GLUTAMINE-N ACET-ARG-VIT-MIN ORAL) Take by mouth.      acebutoloL (SECTRAL) 200 MG capsule Take 1 capsule (200 mg total) by mouth 2 (two) times daily. 180 capsule 3    amLODIPine (NORVASC) 5 MG tablet Take 1 tablet (5 mg total) by mouth every morning. 90 tablet 3    ascorbic acid, vitamin C, (VITAMIN C) 1000 MG tablet Take 1,000 mg by mouth.      aspirin (ECOTRIN) 81 MG EC tablet Take 81 mg by mouth once daily.      atorvastatin (LIPITOR) 10 MG tablet Take 1 tablet (10 mg total) by mouth nightly. 90 tablet 3    bacillus coagulans-inulin 1 billion-250 cell-mg Cap Take by mouth.      cholecalciferol, vitamin D3, 5,000 unit Tab Take by mouth.      ciclopirox (LOPROX) 0.77 % Crea Apply topically 2 (two) times daily. Use for 2-4 weeks to rash on buttocks and to rash on feet when needed until " resolved 90 g 1    clobetasoL (TEMOVATE) 0.05 % external solution Apply to affected area on scalp daily for 2 weeks, then three times weekly 50 mL 5    coenzyme Q10 100 mg capsule Take 100 mg by mouth once daily.      cyanocobalamin (VITAMIN B-12) 1000 MCG tablet Take 100 mcg by mouth once daily.      doxazosin (CARDURA) 1 MG tablet Take 1 tablet (1 mg total) by mouth nightly. 90 tablet 3    fluticasone propionate (FLONASE) 50 mcg/actuation nasal spray 1 spray (50 mcg total) by Each Nostril route once daily. 48 g 3    glucosamine-chondroit-vit C-Mn 500-400 mg capsule Take by mouth.      GLUCOSAMINE/CHONDR MARI A SOD (OSTEO BI-FLEX ORAL) Take 1,500 mg by mouth.      ketoconazole (NIZORAL) 2 % shampoo Apply topically 3 (three) times a week. 120 mL 5    magnesium oxide (MAG-OX) 400 mg (241.3 mg magnesium) tablet Take 1 twice a day for 1 month, then 1 daily; if diarrhea occurs, decrease dose  0    olmesartan (BENICAR) 20 MG tablet TAKE 1 TABLET BY MOUTH EVERY EVENING 90 tablet 3    omega-3 acid ethyl esters (LOVAZA) 1 gram capsule Take 2 capsules (2 g total) by mouth 2 (two) times daily. 360 capsule 3    omeprazole (PRILOSEC) 20 MG capsule Take 1 capsule (20 mg total) by mouth once daily. 90 capsule 3    sildenafiL (VIAGRA) 100 MG tablet Take 1 tablet (100 mg total) by mouth daily as needed for Erectile Dysfunction. 90 tablet 3    tadalafiL (CIALIS) 20 MG Tab Take 1 tablet (20 mg total) by mouth daily as needed. 30 tablet 11    vardenafiL (LEVITRA) 20 MG tablet       vitamin A acetate 10,000 unit Subl Place 2,400 mcg under the tongue.      zinc acetate 50 mg (zinc) Cap Take 50 mg by mouth.       No current facility-administered medications for this visit.       Past Medical History:   Diagnosis Date    Myalgia     PRA INJECT TRIGGER POINTS, > 3  MT METHYLPREDNISOLONE 40 MG INJ     Past Surgical History:   Procedure Laterality Date     colonoscopy  01/07/2016    one 4 mm poly in the cecum. one 3 mm poly in the ascending  colon. Repeat colonosocpy in 5 years    CATARACT EXTRACTION, BILATERAL  02/2020    CHOLECYSTECTOMY  2020    colonoscopy  01/26/2021    EYE SURGERY  2020    HERNIA REPAIR  2020    NECK SURGERY      TONSILLECTOMY  1964     Family History       Problem Relation (Age of Onset)    Cancer Mother    Heart disease Father          Social History     Socioeconomic History    Marital status:    Tobacco Use    Smoking status: Never    Smokeless tobacco: Never   Substance and Sexual Activity    Alcohol use: Yes     Alcohol/week: 8.0 standard drinks of alcohol     Types: 4 Glasses of wine, 4 Drinks containing 0.5 oz of alcohol per week    Drug use: No    Sexual activity: Yes     Partners: Female     Birth control/protection: None   Social History Narrative    N/A per the patient      Social Determinants of Health     Financial Resource Strain: Low Risk  (4/26/2023)    Overall Financial Resource Strain (CARDIA)     Difficulty of Paying Living Expenses: Not hard at all   Food Insecurity: No Food Insecurity (4/26/2023)    Hunger Vital Sign     Worried About Running Out of Food in the Last Year: Never true     Ran Out of Food in the Last Year: Never true   Transportation Needs: No Transportation Needs (4/26/2023)    PRAPARE - Transportation     Lack of Transportation (Medical): No     Lack of Transportation (Non-Medical): No   Stress: No Stress Concern Present (4/26/2023)    [employer] [place] [employer] - Occupational Stress Questionnaire     Feeling of Stress : Not at all   Social Connections: Unknown (4/26/2023)    Social Connection and Isolation Panel [NHANES]     Marital Status:    Housing Stability: Unknown (4/26/2023)    Housing Stability Vital Sign     Unable to Pay for Housing in the Last Year: No     Unstable Housing in the Last Year: No       Review of Systems    OBJECTIVE:     Vital Signs  Pulse: (!) 52  BP: 131/83  Pain Score:   2  Weight: 84.4 kg (186 lb)  Body mass index is 26.69  kg/m².      Neurosurgery Physical Exam  General: well developed, well nourished, no distress.   Head: normocephalic, atraumatic  Neurologic: Alert and oriented. Thought content appropriate.  GCS: Motor: 6/Verbal: 5/Eyes: 4 GCS Total: 15  Mental Status: Awake, Alert, Oriented x 4  Language: No aphasia  Speech: No dysarthria  Cranial nerves: face symmetric, tongue midline, CN II-XII grossly intact.   Eyes: pupils equal, round, reactive to light with accomodation, EOMI.   Pulmonary: normal respirations, no signs of respiratory distress  Abdomen: soft, non-distended  Skin: Skin is warm, dry and intact.  Sensory: intact to light touch throughout  Motor Strength:Moves all extremities spontaneously with good tone.    Strength  Deltoids Triceps Biceps Wrist Extension Wrist Flexion Hand    Upper: R 5/5 5/5 5/5 5/5 5/5 5/5    L 5/5 5/5 5/5 5/5 5/5 5/5     HF KE KF DF PF EHL   Lower: R 5/5 5/5 5/5 5/5 5/5 5/5    L 5/5 5/5 5/5 5/5 5/5 5/5     Reflexes:   Painting's: Negative.     Cerebellar:   Gait stable, fluid.   Tandem Gait: Loss of balance  Able to walk on heels & toes     Cervical:   ROM: Pain limited with flexion, extension, lateral rotation and ear-to-shoulder bend.   Midline TTP: Negative.     Thoracic:  Midline TTP: Negative.     Lumbar:  Midline TTP: Negative.  Straight Leg Test: Negative.    Diagnostic Results:  There is no new imaging to review for this encounter.      ASSESSMENT/PLAN:   Giovanni Moncada is a 68 y.o. male seen in clinic today to discuss with Dr. Yeager concerns with persistent neck and back pain. The patient has a hx of 2 level cervical fusion with Dr. Diaz in 2019. He continues to struggle with the neck and back pain despite surgery, massages, and injections. I have ordered:    - Updated MRI cervical and lumbar spine to evaluate for stenosis contributing to his pain  - CT cervical spine given his surgical hx  - Dynamic x-rays for instability    The patient would like to follow-up in clinic  with Dr. Yeager to discuss the imaging and next steps. I have encouraged him to contact the clinic with any questions, concerns, or adverse clinical changes. He verbalized understanding.      RAZIA Frias  Neurosurgery  [facility] [place].      Note dictated with voice recognition software, please excuse any grammatical errors.

## 2023-11-15 NOTE — TELEPHONE ENCOUNTER
"----- Message from Moncho Boone MD sent at 11/15/2023  9:11 AM CST -----  Procedure: EGD    Diagnosis: Dysphagia    Procedure Timin-4 weeks    #If within 4 weeks selected, please eugenio as high priority#    #If greater than 12 weeks, please select "4-12 weeks" and delay sending until 2 months prior to requested date#     Provider: Myself    Location: No Preference    Additional Scheduling Information: No scheduling concerns    Prep Specifications:N/A    Have you attached a patient to this message: yes      "

## 2023-11-15 NOTE — TELEPHONE ENCOUNTER
Spoke to [place] to schedule procedure(s) Upper Endoscopy (EGD)       Physician to perform procedure(s) Dr. DIANA Brown  Date of Procedure (s) 11/17/23  Arrival Time 12:40 PM  Time of Procedure(s) 1:40 PM   Location of Procedure(s) [address]  Type of Rx Prep sent to patient: Other  Instructions provided to patient via [facility]    Patient was informed on the following information and verbalized understanding. Screening questionnaire reviewed with patient and complete. If procedure requires anesthesia, a responsible adult needs to be present to accompany the patient home, patient cannot drive after receiving anesthesia. Appointment details are tentative, especially check-in time. Patient will receive a prep-op call 4 days prior to confirm check-in time for procedure. If applicable the patient should contact their pharmacy to verify Rx for procedure prep is ready for pick-up. Patient was advised to call the scheduling department at [phone] if pharmacy states no Rx is available. Patient was advised to call the endoscopy scheduling department if any questions or concerns arise.       Endoscopy Scheduling Department

## 2023-11-17 ENCOUNTER — HOSPITAL ENCOUNTER (OUTPATIENT)
Facility: HOSPITAL | Age: 68
Discharge: HOME | End: 2023-11-17
Attending: INTERNAL MEDICINE | Admitting: INTERNAL MEDICINE
Payer: MEDICARE

## 2023-11-17 ENCOUNTER — ANESTHESIA EVENT (OUTPATIENT)
Dept: ENDOSCOPY | Facility: HOSPITAL | Age: 68
End: 2023-11-17
Payer: MEDICARE

## 2023-11-17 ENCOUNTER — ANESTHESIA (OUTPATIENT)
Dept: ENDOSCOPY | Facility: HOSPITAL | Age: 68
End: 2023-11-17
Payer: MEDICARE

## 2023-11-17 VITALS
RESPIRATION RATE: 18 BRPM | DIASTOLIC BLOOD PRESSURE: 56 MMHG | HEIGHT: 70 IN | TEMPERATURE: 98 F | SYSTOLIC BLOOD PRESSURE: 113 MMHG | HEART RATE: 54 BPM | WEIGHT: 180 LBS | BODY MASS INDEX: 25.77 KG/M2 | OXYGEN SATURATION: 94 %

## 2023-11-17 DIAGNOSIS — R13.10 DYSPHAGIA: ICD-10-CM

## 2023-11-17 PROCEDURE — E9220 PRA ENDO ANESTHESIA: ICD-10-PCS | Mod: ,,, | Performed by: NURSE ANESTHETIST, CERTIFIED REGISTERED

## 2023-11-17 PROCEDURE — 43239 EGD BIOPSY SINGLE/MULTIPLE: CPT | Mod: ,,, | Performed by: INTERNAL MEDICINE

## 2023-11-17 PROCEDURE — 37000008 HC ANESTHESIA 1ST 15 MINUTES: Performed by: INTERNAL MEDICINE

## 2023-11-17 PROCEDURE — 37000009 HC ANESTHESIA EA ADD 15 MINS: Performed by: INTERNAL MEDICINE

## 2023-11-17 PROCEDURE — 25000003 PHARM REV CODE 250: Performed by: NURSE ANESTHETIST, CERTIFIED REGISTERED

## 2023-11-17 PROCEDURE — 88305 TISSUE EXAM BY PATHOLOGIST: ICD-10-PCS | Mod: 26,,, | Performed by: PATHOLOGY

## 2023-11-17 PROCEDURE — E9220 PRA ENDO ANESTHESIA: HCPCS | Mod: ,,, | Performed by: NURSE ANESTHETIST, CERTIFIED REGISTERED

## 2023-11-17 PROCEDURE — 88305 TISSUE EXAM BY PATHOLOGIST: CPT | Performed by: PATHOLOGY

## 2023-11-17 PROCEDURE — 88305 TISSUE EXAM BY PATHOLOGIST: CPT | Mod: 26,,, | Performed by: PATHOLOGY

## 2023-11-17 PROCEDURE — 43239 PR EGD, FLEX, W/BIOPSY, SGL/MULTI: ICD-10-PCS | Mod: ,,, | Performed by: INTERNAL MEDICINE

## 2023-11-17 PROCEDURE — 43239 EGD BIOPSY SINGLE/MULTIPLE: CPT | Performed by: INTERNAL MEDICINE

## 2023-11-17 RX ORDER — SODIUM CHLORIDE 9 MG/ML
INJECTION, SOLUTION INTRAVENOUS CONTINUOUS
Status: DISCONTINUED | OUTPATIENT
Start: 2023-11-17 | End: 2023-11-17 | Stop reason: HOSPADM

## 2023-11-17 RX ORDER — PROPOFOL 10 MG/ML
VIAL (ML) INTRAVENOUS
Status: DISCONTINUED | OUTPATIENT
Start: 2023-11-17 | End: 2023-11-17

## 2023-11-17 RX ORDER — LIDOCAINE HYDROCHLORIDE 20 MG/ML
INJECTION INTRAVENOUS
Status: DISCONTINUED | OUTPATIENT
Start: 2023-11-17 | End: 2023-11-17

## 2023-11-17 RX ADMIN — Medication 50 MG: at 01:11

## 2023-11-17 RX ADMIN — GLYCOPYRROLATE 0.2 MG: 0.2 INJECTION, SOLUTION INTRAMUSCULAR; INTRAVENOUS at 01:11

## 2023-11-17 RX ADMIN — SODIUM CHLORIDE: 0.9 INJECTION, SOLUTION INTRAVENOUS at 01:11

## 2023-11-17 RX ADMIN — LIDOCAINE HYDROCHLORIDE 100 MG: 20 INJECTION INTRAVENOUS at 01:11

## 2023-11-17 NOTE — ANESTHESIA PREPROCEDURE EVALUATION
"                                                                                                             2023  Giovanni Moncada is a 68 y.o., male.  Pre-operative evaluation for EGD (ESOPHAGOGASTRODUODENOSCOPY) (N/A)    Chief Complaint: difficulty swallowing, regurgitation    PMH:  SCC, unknown primary with cervical lymph node mets  Radiation therapy, radical neck   HTN  PVCs  Decreased hearing    Past Surgical History:   Procedure Laterality Date     colonoscopy  2016    one 4 mm poly in the cecum. one 3 mm poly in the ascending colon. Repeat colonosocpy in 5 years    CATARACT EXTRACTION, BILATERAL  2020    CHOLECYSTECTOMY      colonoscopy  2021    EYE SURGERY      HERNIA REPAIR      NECK SURGERY      TONSILLECTOMY  1964         Vital Signs Range (Last 24H):  Temp:  [36.5 °C (97.7 °F)]   Pulse:  [46]   Resp:  [16]   BP: (164)/(81)   SpO2:  [99 %]       CBC:   No results for input(s): "WBC", "RBC", "HGB", "HCT", "PLT", "MCV", "MCH", "MCHC" in the last 720 hours.    CMP: No results for input(s): "NA", "K", "CL", "CO2", "BUN", "CREATININE", "GLU", "MG", "PHOS", "CALCIUM", "ALBUMIN", "PROT", "ALKPHOS", "ALT", "AST", "BILITOT" in the last 720 hours.    INR:  No results for input(s): "PT", "INR", "PROTIME", "APTT" in the last 720 hours.      Diagnostic Studies:      EKD Echo:     Pre-op Assessment    I have reviewed the Patient Summary Reports.     I have reviewed the Nursing Notes. I have reviewed the NPO Status.   I have reviewed the Medications.     Review of Systems  Anesthesia Hx:  No problems with previous Anesthesia                Hematology/Oncology:                        --  Cancer in past history:              radiation and surgery       Pulmonary:  Pulmonary Normal                       Neurological:  Neurology Normal                                          Physical Exam  General: Well nourished, Cooperative, Alert and Oriented    Airway:  Mallampati: I "   Mouth Opening: Normal  TM Distance: Normal  Tongue: Decreased Mobility  Neck ROM: Extension Decreased, Left Lateral Motion Decreased, Right Lateral Motion Decreased  Neck scaring, torticollis  Dental:  Intact    Chest/Lungs:  Normal Respiratory Rate        Anesthesia Plan  Type of Anesthesia, risks & benefits discussed:    Anesthesia Type: Gen Natural Airway, MAC  Intra-op Monitoring Plan: Standard ASA Monitors  Post Op Pain Control Plan: multimodal analgesia  Induction:  IV  Informed Consent: Informed consent signed with the Patient and all parties understand the risks and agree with anesthesia plan.  All questions answered.   ASA Score: 3  Day of Surgery Review of History & Physical: H&P Update referred to the surgeon/provider.    Ready For Surgery From Anesthesia Perspective.     .

## 2023-11-17 NOTE — H&P
[facility]  History & Physical    Subjective:      Chief Complaint/Reason for Admission:    Direct access EGD for dysphagia from Dr. Paolo Davila Sb Moncada is a 68 y.o. male.    Past Medical History:   Diagnosis Date    Myalgia     PRA INJECT TRIGGER POINTS, > 3  RI METHYLPREDNISOLONE 40 MG INJ     Past Surgical History:   Procedure Laterality Date     colonoscopy  01/07/2016    one 4 mm poly in the cecum. one 3 mm poly in the ascending colon. Repeat colonosocpy in 5 years    CATARACT EXTRACTION, BILATERAL  02/2020    CHOLECYSTECTOMY  2020    colonoscopy  01/26/2021    EYE SURGERY  2020    HERNIA REPAIR  2020    NECK SURGERY      TONSILLECTOMY  1964       Social History     Tobacco Use    Smoking status: Never    Smokeless tobacco: Never   Substance Use Topics    Alcohol use: Yes     Alcohol/week: 8.0 standard drinks of alcohol     Types: 4 Glasses of wine, 4 Drinks containing 0.5 oz of alcohol per week    Drug use: Yes     Frequency: 3.0 times per week     Types: Marijuana     Comment: liquid marijuana, before bed       PTA Medications   Medication Sig    a-cysteine/arg zinc/glut/mv-mn (L GLUTAMINE-N ACET-ARG-VIT-MIN ORAL) Take by mouth.    acebutoloL (SECTRAL) 200 MG capsule Take 1 capsule (200 mg total) by mouth 2 (two) times daily.    amLODIPine (NORVASC) 5 MG tablet Take 1 tablet (5 mg total) by mouth every morning.    ascorbic acid, vitamin C, (VITAMIN C) 1000 MG tablet Take 1,000 mg by mouth.    aspirin (ECOTRIN) 81 MG EC tablet Take 81 mg by mouth once daily.    atorvastatin (LIPITOR) 10 MG tablet Take 1 tablet (10 mg total) by mouth nightly.    bacillus coagulans-inulin 1 billion-250 cell-mg Cap Take by mouth.    cholecalciferol, vitamin D3, 5,000 unit Tab Take by mouth.    coenzyme Q10 100 mg capsule Take 100 mg by mouth once daily.    cyanocobalamin (VITAMIN B-12) 1000 MCG tablet Take 100 mcg by mouth once daily.    doxazosin (CARDURA) 1 MG tablet Take 1 tablet (1 mg total) by  mouth nightly.    glucosamine-chondroit-vit C-Mn 500-400 mg capsule Take by mouth.    GLUCOSAMINE/CHONDR MARI A SOD (OSTEO BI-FLEX ORAL) Take 1,500 mg by mouth.    magnesium oxide (MAG-OX) 400 mg (241.3 mg magnesium) tablet Take 1 twice a day for 1 month, then 1 daily; if diarrhea occurs, decrease dose    olmesartan (BENICAR) 20 MG tablet TAKE 1 TABLET BY MOUTH EVERY EVENING    omega-3 acid ethyl esters (LOVAZA) 1 gram capsule Take 2 capsules (2 g total) by mouth 2 (two) times daily.    omeprazole (PRILOSEC) 20 MG capsule Take 1 capsule (20 mg total) by mouth once daily.    vitamin A acetate 10,000 unit Subl Place 2,400 mcg under the tongue.    zinc acetate 50 mg (zinc) Cap Take 50 mg by mouth.    ciclopirox (LOPROX) 0.77 % Crea Apply topically 2 (two) times daily. Use for 2-4 weeks to rash on buttocks and to rash on feet when needed until resolved    clobetasoL (TEMOVATE) 0.05 % external solution Apply to affected area on scalp daily for 2 weeks, then three times weekly    fluticasone propionate (FLONASE) 50 mcg/actuation nasal spray 1 spray (50 mcg total) by Each Nostril route once daily.    ketoconazole (NIZORAL) 2 % shampoo Apply topically 3 (three) times a week.    sildenafiL (VIAGRA) 100 MG tablet Take 1 tablet (100 mg total) by mouth daily as needed for Erectile Dysfunction.    tadalafiL (CIALIS) 20 MG Tab Take 1 tablet (20 mg total) by mouth daily as needed.    UNABLE TO FIND Chew-8-1 CBD:THC (wellness chews) 1 chew once to twice a day    UNABLE TO FIND 1:1 cbd:thc tincture 25ml to 1ml under tongue 3xday as needed    vardenafiL (LEVITRA) 20 MG tablet      Review of patient's allergies indicates:   Allergen Reactions    Lamisil [terbinafine] Hives        Review of Systems   Constitutional:  Negative for fever.   Respiratory:  Negative for shortness of breath.    Cardiovascular:  Negative for chest pain.       Objective:      Vital Signs (Most Recent)  Temp: 97.7 °F (36.5 °C) (11/17/23 1320)  Pulse: (!) 46  (usually around 48 per patient) (11/17/23 1320)  Resp: 16 (11/17/23 1320)  BP: (!) 164/81 (11/17/23 1320)  SpO2: 99 % (11/17/23 1320)    Vital Signs Range (Last 24H):  Temp:  [97.7 °F (36.5 °C)]   Pulse:  [46]   Resp:  [16]   BP: (164)/(81)   SpO2:  [99 %]     Physical Exam  Cardiovascular:      Rate and Rhythm: Bradycardia present.   Pulmonary:      Effort: Pulmonary effort is normal.   Neurological:      Mental Status: He is alert and oriented to person, place, and time.             Assessment:      Direct access EGD for dysphagia from Dr. Boone      Plan:    EGD

## 2023-11-17 NOTE — TRANSFER OF CARE
"Anesthesia Transfer of Care Note    Patient: Giovanni Moncada    Procedure(s) Performed: Procedure(s) (LRB):  EGD (ESOPHAGOGASTRODUODENOSCOPY) (N/A)    Patient location: PACU    Anesthesia Type: general    Transport from OR: Transported from OR on room air with adequate spontaneous ventilation    Post pain: adequate analgesia    Post assessment: no apparent anesthetic complications and tolerated procedure well    Post vital signs: stable    Level of consciousness: awake, alert and oriented    Nausea/Vomiting: no nausea/vomiting    Complications: none    Transfer of care protocol was followed      Last vitals: Visit Vitals  BP (!) 80/65   Pulse 51   Temp 36.5 °C (97.7 °F)   Resp 16   Ht 5' 10" (1.778 m)   Wt 81.6 kg (180 lb)   SpO2 99%   BMI 25.83 kg/m²     "

## 2023-11-17 NOTE — PR[employer] PATIENT INSTRUCTIONS
Discharge Summary/Instructions after an Endoscopic Procedure  Patient Name: Giovanni Moncada  Patient MRN: [id number]  Patient YOB: 1955 Friday, November 17, 2023  Arnav Brown MD  Dear patient,  As a result of recent federal legislation (The Federal Cures Act), you may   receive lab or pathology results from your procedure in your [facility]   account before your physician is able to contact you. Your physician or   their representative will relay the results to you with their   recommendations at their soonest availability.  Thank you,  RESTRICTIONS:  During your procedure today, you received medications for sedation.  These   medications may affect your judgment, balance and coordination.  Therefore,   for 24 hours, you have the following restrictions:   - DO NOT drive a car, operate machinery, make legal/financial decisions,   sign important papers or drink alcohol.    ACTIVITY:  Today: no heavy lifting, straining or running due to procedural   sedation/anesthesia.  The following day: return to full activity including work.  DIET:  Eat and drink normally unless instructed otherwise.     TREATMENT FOR COMMON SIDE EFFECTS:  - Mild abdominal pain, nausea, belching, bloating or excessive gas:  rest,   eat lightly and use a heating pad.  - Sore Throat: treat with throat lozenges and/or gargle with warm salt   water.  - Because air was used during the procedure, expelling large amounts of air   from your rectum or belching is normal.  - If a bowel prep was taken, you may not have a bowel movement for 1-3 days.    This is normal.  SYMPTOMS TO WATCH FOR AND REPORT TO YOUR PHYSICIAN:  1. Abdominal pain or bloating, other than gas cramps.  2. Chest pain.  3. Back pain.  4. Signs of infection such as: chills or fever occurring within 24 hours   after the procedure.  5. Rectal bleeding, which would show as bright red, maroon, or black stools.   (A tablespoon of blood from the rectum is not serious, especially if    hemorrhoids are present.)  6. Vomiting.  7. Weakness or dizziness.  GO DIRECTLY TO THE NEAREST EMERGENCY ROOM IF YOU HAVE ANY OF THE FOLLOWING:      Difficulty breathing              Chills and/or fever over 101 F   Persistent vomiting and/or vomiting blood   Severe abdominal pain   Severe chest pain   Black, tarry stools   Bleeding- more than one tablespoon   Any other symptom or condition that you feel may need urgent attention  Your doctor recommends these additional instructions:  If any biopsies were taken, your doctors clinic will contact you in 1 to 2   weeks with any results.  - Discharge patient to home.   - Await pathology results.   - Telephone endoscopist for pathology results in 3 weeks.   - Return to referring physician.   - Telephone referring physician for study results.   - The findings and recommendations were discussed with the patient.  For questions, problems or results please call your physician - Arnav Brown MD at Work:  [phone].  [facility] [place], EMERGENCY ROOM PHONE NUMBER: [phone]  IF A COMPLICATION OR EMERGENCY SITUATION ARISES AND YOU ARE UNABLE TO REACH   YOUR PHYSICIAN - GO DIRECTLY TO THE EMERGENCY ROOM.  Arnav Brown MD  11/17/2023 2:13:09 PM  This report has been verified and signed electronically.  Dear patient,  As a result of recent federal legislation (The Federal Cures Act), you may   receive lab or pathology results from your procedure in your [facility]   account before your physician is able to contact you. Your physician or   their representative will relay the results to you with their   recommendations at their soonest availability.  Thank you,  [employer]ATION

## 2023-11-17 NOTE — ANESTHESIA POSTPROCEDURE EVALUATION
Anesthesia Post Evaluation    Patient: Giovanni Moncada    Procedure(s) Performed: Procedure(s) (LRB):  EGD (ESOPHAGOGASTRODUODENOSCOPY) (N/A)    Final Anesthesia Type: general      Patient location during evaluation: PACU  Patient participation: Yes- Able to Participate  Level of consciousness: awake and alert and oriented  Post-procedure vital signs: reviewed and stable  Pain management: adequate  Airway patency: patent    PONV status at discharge: No PONV  Anesthetic complications: no      Cardiovascular status: hemodynamically stable  Respiratory status: unassisted, spontaneous ventilation and room air  Hydration status: euvolemic  Follow-up not needed.          Vitals Value Taken Time   /56 11/17/23 1511   Pulse 54 11/17/23 1511   Resp 18 11/17/23 1417   SpO2 94 % 11/17/23 1438         Event Time   Out of Recovery 15:12:12         Pain/Beckie Score: Beckie Score: 8 (11/17/2023  2:31 PM)

## 2023-11-21 LAB
FINAL PATHOLOGIC DIAGNOSIS: NORMAL
GROSS: NORMAL
Lab: NORMAL

## 2023-11-24 ENCOUNTER — TELEPHONE (OUTPATIENT)
Dept: NEUROSURGERY | Facility: CLINIC | Age: 68
End: 2023-11-24
Payer: MEDICARE

## 2023-11-24 NOTE — TELEPHONE ENCOUNTER
Called - no answer.   Left VM informing of scheduled time and date for VV with Dr. Yeager          ----- Message from Gabriela Winkler MA sent at 11/24/2023 10:20 AM CST -----  Regarding: appointment  Hey,     Can you please assist with getting this pt a f/u with Dr. Yeager?

## 2023-11-28 ENCOUNTER — TELEPHONE (OUTPATIENT)
Dept: NEUROSURGERY | Facility: CLINIC | Age: 68
End: 2023-11-28
Payer: MEDICARE

## 2023-11-28 NOTE — TELEPHONE ENCOUNTER
----- Message from Hanna Arreguin sent at 11/27/2023 11:32 AM CST -----  Regarding: Pt prefers in person appt  Contact: pt @[phone]  Pt is calling to change virtual appt to in person that is scheduled on 1-2-2024. Please c/b to advise. Thanks

## 2023-11-30 ENCOUNTER — PATIENT MESSAGE (OUTPATIENT)
Dept: NEUROSURGERY | Facility: CLINIC | Age: 68
End: 2023-11-30
Payer: MEDICARE

## 2023-12-09 NOTE — PROGRESS NOTES
Sb your EGD pathology was benign no evidence of H pylori no evidence of Barretts esophagus or eosinophilic esophagitis.      1. Stomach (biopsy):  Antral and oxyntic mucosa with reactive/regenerative changes  Considerations include erosive/chemical type gastropathy  Background mild chronic inflammation, nonspecific  No active inflammation  No Helicobacter organisms    2. Distal esophagus (biopsy):  No intestinal metaplasia, no dysplasia  Minimally reactive squamous mucosa with rare eosinophil (1)  May represent reflux disease    3. Mid proximal esophagus (biopsy):  Squamous mucosa with no significant histopathologic changes   Comment: Interp By Betty Diaz M.D., Signed on 11/21/2023

## 2023-12-18 ENCOUNTER — HOSPITAL ENCOUNTER (OUTPATIENT)
Dept: RADIOLOGY | Facility: HOSPITAL | Age: 68
Discharge: HOME | End: 2023-12-18
Attending: NURSE PRACTITIONER
Payer: MEDICARE

## 2023-12-18 ENCOUNTER — TELEPHONE (OUTPATIENT)
Dept: PRIMARY CARE CLINIC | Facility: CLINIC | Age: 68
End: 2023-12-18
Payer: MEDICARE

## 2023-12-18 DIAGNOSIS — Z98.1 HX OF FUSION OF CERVICAL SPINE: ICD-10-CM

## 2023-12-18 DIAGNOSIS — E04.1 THYROID NODULE: Primary | ICD-10-CM

## 2023-12-18 DIAGNOSIS — M54.9 DORSALGIA, UNSPECIFIED: ICD-10-CM

## 2023-12-18 DIAGNOSIS — M48.02 SPINAL STENOSIS, CERVICAL REGION: ICD-10-CM

## 2023-12-18 DIAGNOSIS — M54.16 LUMBAR RADICULOPATHY: ICD-10-CM

## 2023-12-18 PROCEDURE — 72114 X-RAY EXAM L-S SPINE BENDING: CPT | Mod: 26,59,, | Performed by: RADIOLOGY

## 2023-12-18 PROCEDURE — 72125 CT CERVICAL SPINE WITHOUT CONTRAST: ICD-10-PCS | Mod: 26,,, | Performed by: RADIOLOGY

## 2023-12-18 PROCEDURE — 72082 XR SCOLIOSIS COMPLETE: ICD-10-PCS | Mod: 26,,, | Performed by: RADIOLOGY

## 2023-12-18 PROCEDURE — 72148 MRI LUMBAR SPINE WITHOUT CONTRAST: ICD-10-PCS | Mod: 26,,, | Performed by: RADIOLOGY

## 2023-12-18 PROCEDURE — 72125 CT NECK SPINE W/O DYE: CPT | Mod: TC

## 2023-12-18 PROCEDURE — 72082 X-RAY EXAM ENTIRE SPI 2/3 VW: CPT | Mod: TC

## 2023-12-18 PROCEDURE — 72141 MRI NECK SPINE W/O DYE: CPT | Mod: TC

## 2023-12-18 PROCEDURE — 72125 CT NECK SPINE W/O DYE: CPT | Mod: 26,,, | Performed by: RADIOLOGY

## 2023-12-18 PROCEDURE — 72141 MRI CERVICAL SPINE WITHOUT CONTRAST: ICD-10-PCS | Mod: 26,,, | Performed by: RADIOLOGY

## 2023-12-18 PROCEDURE — 72052 X-RAY EXAM NECK SPINE 6/>VWS: CPT | Mod: 59,TC

## 2023-12-18 PROCEDURE — 72052 X-RAY EXAM NECK SPINE 6/>VWS: CPT | Mod: 26,59,, | Performed by: RADIOLOGY

## 2023-12-18 PROCEDURE — 72148 MRI LUMBAR SPINE W/O DYE: CPT | Mod: TC

## 2023-12-18 PROCEDURE — 72082 X-RAY EXAM ENTIRE SPI 2/3 VW: CPT | Mod: 26,,, | Performed by: RADIOLOGY

## 2023-12-18 PROCEDURE — 72114 XR LUMBAR SPINE 5 VIEW WITH FLEX AND EXT: ICD-10-PCS | Mod: 26,59,, | Performed by: RADIOLOGY

## 2023-12-18 PROCEDURE — 72052 XR CERVICAL SPINE 5 VIEW WITH FLEX AND EXT: ICD-10-PCS | Mod: 26,59,, | Performed by: RADIOLOGY

## 2023-12-18 PROCEDURE — 72141 MRI NECK SPINE W/O DYE: CPT | Mod: 26,,, | Performed by: RADIOLOGY

## 2023-12-18 PROCEDURE — 72114 X-RAY EXAM L-S SPINE BENDING: CPT | Mod: 59,TC

## 2023-12-18 PROCEDURE — 72148 MRI LUMBAR SPINE W/O DYE: CPT | Mod: 26,,, | Performed by: RADIOLOGY

## 2023-12-18 NOTE — TELEPHONE ENCOUNTER
----- Message from Barb Ha NP sent at 12/18/2023  1:22 PM CST -----  Regarding: Thyroid US  Good Afternoon,  We recently obtained imaging on this patient and the radiologist noted a thyroid nodule, which should be further evaluated with a dedicated ultrasound. Could you please ask Dr. Flaherty to order this imaging.    Thank you,    DOLLY Frias-JAVIER  Neurosurgery  [facility]

## 2023-12-26 ENCOUNTER — OFFICE VISIT (OUTPATIENT)
Dept: NEUROSURGERY | Facility: CLINIC | Age: 68
End: 2023-12-26
Payer: MEDICARE

## 2023-12-26 DIAGNOSIS — M54.50 CHRONIC BILATERAL LOW BACK PAIN WITHOUT SCIATICA: ICD-10-CM

## 2023-12-26 DIAGNOSIS — G89.29 CHRONIC BILATERAL LOW BACK PAIN WITHOUT SCIATICA: ICD-10-CM

## 2023-12-26 PROCEDURE — 3288F FALL RISK ASSESSMENT DOCD: CPT | Mod: CPTII,S$GLB,, | Performed by: NEUROLOGICAL SURGERY

## 2023-12-26 PROCEDURE — 99214 OFFICE O/P EST MOD 30 MIN: CPT | Mod: S$GLB,,, | Performed by: NEUROLOGICAL SURGERY

## 2023-12-26 PROCEDURE — 99999 PR PBB SHADOW E&M-EST. PATIENT-LVL II: CPT | Mod: PBBFAC,,, | Performed by: NEUROLOGICAL SURGERY

## 2023-12-26 PROCEDURE — 1159F MED LIST DOCD IN RCRD: CPT | Mod: CPTII,S$GLB,, | Performed by: NEUROLOGICAL SURGERY

## 2023-12-26 PROCEDURE — 1125F AMNT PAIN NOTED PAIN PRSNT: CPT | Mod: CPTII,S$GLB,, | Performed by: NEUROLOGICAL SURGERY

## 2023-12-26 PROCEDURE — 1101F PT FALLS ASSESS-DOCD LE1/YR: CPT | Mod: CPTII,S$GLB,, | Performed by: NEUROLOGICAL SURGERY
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
## 2024-03-25 DIAGNOSIS — I10 ESSENTIAL HYPERTENSION: ICD-10-CM

## 2024-03-25 NOTE — TELEPHONE ENCOUNTER
No care due was identified.  [facility] Embedded Care Due Messages. Reference number: [id number].   3/25/2024 10:27:51 AM CDT

## 2024-03-26 RX ORDER — OLMESARTAN MEDOXOMIL 20 MG/1
TABLET ORAL
Qty: 90 TABLET | Refills: 1 | Status: SHIPPED | OUTPATIENT
Start: 2024-03-26

## 2024-03-26 NOTE — TELEPHONE ENCOUNTER
Refill Decision Note   Giovanni Moncada  is requesting a refill authorization.  Brief Assessment and Rationale for Refill:  Approve     Medication Therapy Plan:         Comments:     Note composed:12:56 PM 03/26/2024

## 2024-04-16 ENCOUNTER — LAB VISIT (OUTPATIENT)
Dept: LAB | Facility: HOSPITAL | Age: 69
End: 2024-04-16
Attending: INTERNAL MEDICINE
Payer: MEDICARE

## 2024-04-16 DIAGNOSIS — K21.9 GERD WITHOUT ESOPHAGITIS: ICD-10-CM

## 2024-04-16 DIAGNOSIS — Z11.3 SCREEN FOR STD (SEXUALLY TRANSMITTED DISEASE): ICD-10-CM

## 2024-04-16 DIAGNOSIS — E78.2 HYPERLIPIDEMIA, MIXED: ICD-10-CM

## 2024-04-16 DIAGNOSIS — Z12.5 SCREENING FOR MALIGNANT NEOPLASM OF PROSTATE: ICD-10-CM

## 2024-04-16 LAB
ALBUMIN SERPL BCP-MCNC: 4 G/DL (ref 3.5–5.2)
ALP SERPL-CCNC: 78 U/L (ref 55–135)
ALT SERPL W/O P-5'-P-CCNC: 18 U/L (ref 10–44)
ANION GAP SERPL CALC-SCNC: 9 MMOL/L (ref 8–16)
AST SERPL-CCNC: 20 U/L (ref 10–40)
BASOPHILS # BLD AUTO: 0.04 K/UL (ref 0–0.2)
BASOPHILS NFR BLD: 0.7 % (ref 0–1.9)
BILIRUB SERPL-MCNC: 0.7 MG/DL (ref 0.1–1)
BUN SERPL-MCNC: 19 MG/DL (ref 8–23)
CALCIUM SERPL-MCNC: 10.1 MG/DL (ref 8.7–10.5)
CHLORIDE SERPL-SCNC: 107 MMOL/L (ref 95–110)
CHOLEST SERPL-MCNC: 147 MG/DL (ref 120–199)
CHOLEST/HDLC SERPL: 2.8 {RATIO} (ref 2–5)
CO2 SERPL-SCNC: 24 MMOL/L (ref 23–29)
COMPLEXED PSA SERPL-MCNC: 0.9 NG/ML (ref 0–4)
CREAT SERPL-MCNC: 0.9 MG/DL (ref 0.5–1.4)
DIFFERENTIAL METHOD BLD: ABNORMAL
EOSINOPHIL # BLD AUTO: 0.2 K/UL (ref 0–0.5)
EOSINOPHIL NFR BLD: 3.1 % (ref 0–8)
ERYTHROCYTE [DISTWIDTH] IN BLOOD BY AUTOMATED COUNT: 13.4 % (ref 11.5–14.5)
EST. GFR  (NO RACE VARIABLE): >60 ML/MIN/1.73 M^2
GLUCOSE SERPL-MCNC: 113 MG/DL (ref 70–110)
HAV IGM SERPL QL IA: NORMAL
HBV CORE IGM SERPL QL IA: NORMAL
HBV SURFACE AG SERPL QL IA: NORMAL
HCT VFR BLD AUTO: 45.2 % (ref 40–54)
HCV AB SERPL QL IA: NORMAL
HDLC SERPL-MCNC: 53 MG/DL (ref 40–75)
HDLC SERPL: 36.1 % (ref 20–50)
HGB BLD-MCNC: 14.7 G/DL (ref 14–18)
HIV 1+2 AB+HIV1 P24 AG SERPL QL IA: NORMAL
IMM GRANULOCYTES # BLD AUTO: 0.02 K/UL (ref 0–0.04)
IMM GRANULOCYTES NFR BLD AUTO: 0.4 % (ref 0–0.5)
LDLC SERPL CALC-MCNC: 70 MG/DL (ref 63–159)
LYMPHOCYTES # BLD AUTO: 1.5 K/UL (ref 1–4.8)
LYMPHOCYTES NFR BLD: 26.8 % (ref 18–48)
MCH RBC QN AUTO: 30.6 PG (ref 27–31)
MCHC RBC AUTO-ENTMCNC: 32.5 G/DL (ref 32–36)
MCV RBC AUTO: 94 FL (ref 82–98)
MONOCYTES # BLD AUTO: 0.8 K/UL (ref 0.3–1)
MONOCYTES NFR BLD: 15.1 % (ref 4–15)
NEUTROPHILS # BLD AUTO: 3 K/UL (ref 1.8–7.7)
NEUTROPHILS NFR BLD: 53.9 % (ref 38–73)
NONHDLC SERPL-MCNC: 94 MG/DL
NRBC BLD-RTO: 0 /100 WBC
PLATELET # BLD AUTO: 173 K/UL (ref 150–450)
PMV BLD AUTO: 12.1 FL (ref 9.2–12.9)
POTASSIUM SERPL-SCNC: 4.4 MMOL/L (ref 3.5–5.1)
PROT SERPL-MCNC: 7 G/DL (ref 6–8.4)
RBC # BLD AUTO: 4.8 M/UL (ref 4.6–6.2)
SODIUM SERPL-SCNC: 140 MMOL/L (ref 136–145)
TRIGL SERPL-MCNC: 120 MG/DL (ref 30–150)
WBC # BLD AUTO: 5.48 K/UL (ref 3.9–12.7)

## 2024-04-16 PROCEDURE — 85025 COMPLETE CBC W/AUTO DIFF WBC: CPT | Performed by: INTERNAL MEDICINE

## 2024-04-16 PROCEDURE — 84153 ASSAY OF PSA TOTAL: CPT | Performed by: INTERNAL MEDICINE

## 2024-04-16 PROCEDURE — 86696 HERPES SIMPLEX TYPE 2 TEST: CPT | Performed by: INTERNAL MEDICINE

## 2024-04-16 PROCEDURE — 80053 COMPREHEN METABOLIC PANEL: CPT | Performed by: INTERNAL MEDICINE

## 2024-04-16 PROCEDURE — 86592 SYPHILIS TEST NON-TREP QUAL: CPT | Performed by: INTERNAL MEDICINE

## 2024-04-16 PROCEDURE — 80061 LIPID PANEL: CPT | Performed by: INTERNAL MEDICINE

## 2024-04-16 PROCEDURE — 80074 ACUTE HEPATITIS PANEL: CPT | Performed by: INTERNAL MEDICINE

## 2024-04-16 PROCEDURE — 36415 COLL VENOUS BLD VENIPUNCTURE: CPT | Performed by: INTERNAL MEDICINE

## 2024-04-16 PROCEDURE — 87389 HIV-1 AG W/HIV-1&-2 AB AG IA: CPT | Performed by: INTERNAL MEDICINE

## 2024-04-17 ENCOUNTER — OFFICE VISIT (OUTPATIENT)
Dept: PRIMARY CARE CLINIC | Facility: CLINIC | Age: 69
End: 2024-04-17
Payer: MEDICARE

## 2024-04-17 VITALS
WEIGHT: 186.06 LBS | BODY MASS INDEX: 26.64 KG/M2 | OXYGEN SATURATION: 98 % | DIASTOLIC BLOOD PRESSURE: 62 MMHG | HEIGHT: 70 IN | HEART RATE: 53 BPM | SYSTOLIC BLOOD PRESSURE: 110 MMHG

## 2024-04-17 DIAGNOSIS — N52.9 ERECTILE DYSFUNCTION, UNSPECIFIED ERECTILE DYSFUNCTION TYPE: ICD-10-CM

## 2024-04-17 DIAGNOSIS — N40.0 BENIGN PROSTATIC HYPERPLASIA WITHOUT LOWER URINARY TRACT SYMPTOMS: ICD-10-CM

## 2024-04-17 DIAGNOSIS — M54.2 CHRONIC NECK PAIN: ICD-10-CM

## 2024-04-17 DIAGNOSIS — I49.5 SICK SINUS SYNDROME: ICD-10-CM

## 2024-04-17 DIAGNOSIS — G89.29 CHRONIC PAIN OF LEFT KNEE: ICD-10-CM

## 2024-04-17 DIAGNOSIS — E78.2 HYPERLIPIDEMIA, MIXED: ICD-10-CM

## 2024-04-17 DIAGNOSIS — G89.29 CHRONIC NECK PAIN: ICD-10-CM

## 2024-04-17 DIAGNOSIS — K21.9 GERD WITHOUT ESOPHAGITIS: ICD-10-CM

## 2024-04-17 DIAGNOSIS — M25.562 CHRONIC PAIN OF LEFT KNEE: ICD-10-CM

## 2024-04-17 DIAGNOSIS — H91.90 HEARING LOSS, UNSPECIFIED HEARING LOSS TYPE, UNSPECIFIED LATERALITY: ICD-10-CM

## 2024-04-17 DIAGNOSIS — Z85.89 HISTORY OF SQUAMOUS CELL CARCINOMA: ICD-10-CM

## 2024-04-17 DIAGNOSIS — I10 ESSENTIAL HYPERTENSION: Primary | ICD-10-CM

## 2024-04-17 DIAGNOSIS — C77.0 SECONDARY AND UNSPECIFIED MALIGNANT NEOPLASM OF LYMPH NODES OF HEAD, FACE AND NECK: ICD-10-CM

## 2024-04-17 LAB
HSV1 IGG SERPL QL IA: NEGATIVE
HSV2 IGG SERPL QL IA: POSITIVE
RPR SER QL: NORMAL

## 2024-04-17 PROCEDURE — 3078F DIAST BP <80 MM HG: CPT | Mod: CPTII,S$GLB,, | Performed by: INTERNAL MEDICINE

## 2024-04-17 PROCEDURE — 1159F MED LIST DOCD IN RCRD: CPT | Mod: CPTII,S$GLB,, | Performed by: INTERNAL MEDICINE

## 2024-04-17 PROCEDURE — 99214 OFFICE O/P EST MOD 30 MIN: CPT | Mod: S$GLB,,, | Performed by: INTERNAL MEDICINE

## 2024-04-17 PROCEDURE — 3074F SYST BP LT 130 MM HG: CPT | Mod: CPTII,S$GLB,, | Performed by: INTERNAL MEDICINE

## 2024-04-17 PROCEDURE — 4010F ACE/ARB THERAPY RXD/TAKEN: CPT | Mod: CPTII,S$GLB,, | Performed by: INTERNAL MEDICINE

## 2024-04-17 PROCEDURE — 3008F BODY MASS INDEX DOCD: CPT | Mod: CPTII,S$GLB,, | Performed by: INTERNAL MEDICINE

## 2024-04-17 PROCEDURE — 99999 PR PBB SHADOW E&M-EST. PATIENT-LVL IV: CPT | Mod: PBBFAC,,, | Performed by: INTERNAL MEDICINE

## 2024-04-17 NOTE — PROGRESS NOTES
[facility] Note    Chief Complaint      Chief Complaint   Patient presents with    Follow-up     6 month     Arm Pain     left    Knee Pain     left       History of Present Illness      Giovanni Moncada is a 68 y.o. male with chronic conditions of HTN, HLD, BPH, GERD, ED, hx of squamous cell carcinoma of neck  who presents today for: follow up chronic condtiions.  Has been having chronic left shoulder and left knee pain.  Has seen Dr. Reid ortho in 2011.  Had success with knee brace in the past.  Complains of hearing loss and has seen Dr. Schaeffer, ENT.    HTN: BP at goal on benicar, amlodipine, acebutolol, doxazosin.  HLD: controlled on atorvastatin.  LDL 70.  Sees Dr. Brown.  ED: Sees Dr. Aquino, urology.  On viagra  GERD:  Sees Dr. Garcia. Controlled on omeprazole.  No new or worsening symptoms.   Cardiac arrhythmia: Sees Dr. Brown.  Previous diagnosed during stress test.  On acebutolol for rate control.  BPH: Sees Dr. Aquino.  Controlled on doxazosin.  No new or worsening symptoms  Hx of squamous cell carcinoma neck s/p resection: no new changes.  Flu shot declines.  Td 2005.  Pneumonia vaccines declines.  Shingles vaccine declines.  COVID vaccine UTD.  Cscope 2021, Dr. Mendes, polyps, 5 yr interval.     Past Medical History:  Past Medical History:   Diagnosis Date    Myalgia     PRA INJECT TRIGGER POINTS, > 3  [place] METHYLPREDNISOLONE 40 MG INJ       Past Surgical History:   has a past surgical history that includes Neck surgery; Cataract extraction, bilateral (02/2020);  colonoscopy (01/07/2016); colonoscopy (01/26/2021); Eye surgery (2020); Hernia repair (2020); Tonsillectomy (1964); Cholecystectomy (2020); and Esophagogastroduodenoscopy (N/A, 11/17/2023).    Family History:  family history includes Cancer in his mother; Heart disease in his father.     Social History:  Social History     Tobacco Use    Smoking status: Never    Smokeless tobacco: Never   Substance Use Topics     Alcohol use: Yes     Alcohol/week: 8.0 standard drinks of alcohol     Types: 4 Glasses of wine, 4 Drinks containing 0.5 oz of alcohol per week    Drug use: Yes     Frequency: 3.0 times per week     Types: Marijuana     Comment: liquid marijuana, before bed       I personally reviewed all past medical, surgical, social and family history.    Review of Systems   Constitutional:  Negative for chills, fever and malaise/fatigue.   Respiratory:  Negative for shortness of breath.    Cardiovascular:  Negative for chest pain.   Gastrointestinal:  Negative for constipation, diarrhea, nausea and vomiting.   Skin:  Negative for rash.   Neurological:  Negative for weakness.   All other systems reviewed and are negative.       Medications:  Outpatient Encounter Medications as of 4/17/2024   Medication Sig Dispense Refill    a-cysteine/arg zinc/glut/mv-mn (L GLUTAMINE-N ACET-ARG-VIT-MIN ORAL) Take by mouth.      acebutoloL (SECTRAL) 200 MG capsule Take 1 capsule (200 mg total) by mouth 2 (two) times daily. 180 capsule 3    amLODIPine (NORVASC) 5 MG tablet Take 1 tablet (5 mg total) by mouth every morning. 90 tablet 3    ascorbic acid, vitamin C, (VITAMIN C) 1000 MG tablet Take 1,000 mg by mouth.      aspirin (ECOTRIN) 81 MG EC tablet Take 81 mg by mouth once daily.      atorvastatin (LIPITOR) 10 MG tablet Take 1 tablet (10 mg total) by mouth nightly. 90 tablet 3    bacillus coagulans-inulin 1 billion-250 cell-mg Cap Take by mouth.      cholecalciferol, vitamin D3, 5,000 unit Tab Take by mouth.      ciclopirox (LOPROX) 0.77 % Crea Apply topically 2 (two) times daily. Use for 2-4 weeks to rash on buttocks and to rash on feet when needed until resolved 90 g 1    clobetasoL (TEMOVATE) 0.05 % external solution Apply to affected area on scalp daily for 2 weeks, then three times weekly 50 mL 5    coenzyme Q10 100 mg capsule Take 100 mg by mouth once daily.      cyanocobalamin (VITAMIN B-12) 1000 MCG tablet Take 100 mcg by mouth once  daily.      doxazosin (CARDURA) 1 MG tablet Take 1 tablet (1 mg total) by mouth nightly. 90 tablet 3    fluticasone propionate (FLONASE) 50 mcg/actuation nasal spray 1 spray (50 mcg total) by Each Nostril route once daily. 48 g 3    glucosamine-chondroit-vit C-Mn 500-400 mg capsule Take by mouth.      GLUCOSAMINE/CHONDR MARI A SOD (OSTEO BI-FLEX ORAL) Take 1,500 mg by mouth.      ketoconazole (NIZORAL) 2 % shampoo Apply topically 3 (three) times a week. 120 mL 5    magnesium oxide (MAG-OX) 400 mg (241.3 mg magnesium) tablet Take 1 twice a day for 1 month, then 1 daily; if diarrhea occurs, decrease dose  0    olmesartan (BENICAR) 20 MG tablet TAKE 1 TABLET BY MOUTH EVERY EVENING 90 tablet 1    omega-3 acid ethyl esters (LOVAZA) 1 gram capsule Take 2 capsules (2 g total) by mouth 2 (two) times daily. 360 capsule 3    omeprazole (PRILOSEC) 20 MG capsule Take 1 capsule (20 mg total) by mouth once daily. 90 capsule 3    sildenafiL (VIAGRA) 100 MG tablet Take 1 tablet (100 mg total) by mouth daily as needed for Erectile Dysfunction. 90 tablet 3    tadalafiL (CIALIS) 20 MG Tab Take 1 tablet (20 mg total) by mouth daily as needed. 30 tablet 11    UNABLE TO FIND Chew-8-1 CBD:THC (wellness chews) 1 chew once to twice a day      UNABLE TO FIND 1:1 cbd:thc tincture 25ml to 1ml under tongue 3xday as needed      vardenafiL (LEVITRA) 20 MG tablet       vitamin A acetate 10,000 unit Subl Place 2,400 mcg under the tongue.      zinc acetate 50 mg (zinc) Cap Take 50 mg by mouth.      [DISCONTINUED] olmesartan (BENICAR) 20 MG tablet TAKE 1 TABLET BY MOUTH EVERY EVENING 90 tablet 3     No facility-administered encounter medications on file as of 4/17/2024.       Allergies:  Review of patient's allergies indicates:   Allergen Reactions    Lamisil [terbinafine] Hives       Health Maintenance:  Immunization History   Administered Date(s) Administered    COVID-19, MRNA, LN-S, PF (Pfizer) (Purple Cap) 07/22/2021, 08/12/2021      Health  "Maintenance   Topic Date Due    TETANUS VACCINE  Never done    Shingles Vaccine (1 of 2) Never done    Lipid Panel  04/16/2029    Colorectal Cancer Screening  01/26/2031    Hepatitis C Screening  Completed        Physical Exam      Vital Signs  Pulse: (!) 53  SpO2: 98 %  BP: 110/62  BP Location: Right arm  Patient Position: Sitting  Height and Weight  Height: 5' 10" (177.8 cm)  Weight: 84.4 kg (186 lb 1.1 oz)  BSA (Calculated - sq m): 2.04 sq meters  BMI (Calculated): 26.7  Weight in (lb) to have BMI = 25: 173.9]    Physical Exam  Vitals reviewed.   Constitutional:       Appearance: He is well-developed.   HENT:      Head: Normocephalic and atraumatic.      Right Ear: External ear normal.      Left Ear: External ear normal.   Cardiovascular:      Rate and Rhythm: Normal rate and regular rhythm.      Heart sounds: Normal heart sounds. No murmur heard.  Pulmonary:      Effort: Pulmonary effort is normal.      Breath sounds: Normal breath sounds. No wheezing or rales.   Abdominal:      General: Bowel sounds are normal.      Palpations: Abdomen is soft.          Laboratory:  CBC:  Recent Labs   Lab 04/16/24  0909   WBC 5.48   RBC 4.80   Hemoglobin 14.7   Hematocrit 45.2   Platelets 173   MCV 94   MCH 30.6   MCHC 32.5     CMP:  Recent Labs   Lab 02/01/23  0950 10/09/23  0905 04/16/24  0909   Glucose 102  102 107 113 H   Calcium 10.0  10.0 9.8 10.1   Albumin 4.3  4.3 3.8 4.0   Total Protein 7.2  7.2 6.8 7.0   Sodium 138  138 138 140   Potassium 4.4  4.4 3.9 4.4   CO2 24  24 26 24   Chloride 106  106 106 107   BUN 14  14 16 19   Alkaline Phosphatase 85  85 74 78   ALT 17  17 13 18   AST 22  22 20 20   Total Bilirubin 0.7  0.7 0.4 0.7     URINALYSIS:  Recent Labs   Lab 02/01/23  0941   Color, UA Yellow   Specific Gravity, UA 1.025   pH, UA 6.0   Protein, UA Negative   Nitrite, UA Negative   Leukocytes, UA Negative      LIPIDS:  Recent Labs   Lab 02/01/23  0950 10/09/23  0905 04/16/24  0909   HDL 52  52 49 53 "   Cholesterol 160  160 167 147   Triglycerides 121  121 135 120   LDL Cholesterol 83.8  83.8 91.0 70.0   HDL/Cholesterol Ratio 32.5  32.5 29.3 36.1   Non-HDL Cholesterol 108  108 118 94   Total Cholesterol/HDL Ratio 3.1  3.1 3.4 2.8     TSH:      A1C:        Assessment/Plan     Giovanni Moncada is a 68 y.o.male with:    1. Essential hypertension  Continue current meds.    2. Sick sinus syndrome  - Ambulatory referral/consult to Cardiology; Future  Continue current meds.  Referring to second opinion cardiologist.  3. Hyperlipidemia, mixed  - Comprehensive Metabolic Panel; Future  - Lipid Panel; Future  Continue current meds.    4. Benign prostatic hyperplasia without lower urinary tract symptoms  Continue current meds.    5. GERD without esophagitis  Continue current meds.    6. Erectile dysfunction, unspecified erectile dysfunction type  Continue current meds.    7. Chronic neck pain  Continue current meds.    8. History of squamous cell carcinoma    9. Chronic pain of left knee  - Ambulatory referral/consult to Orthopedics; Future    10. 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  Other than changes above, cont current medications and maintain follow up with specialists.  No follow-ups on file.    Future Appointments   Date Time Provider Department Center   4/22/2024 10:15 AM Agueda Nielson MD [facility] SPORTS [place]   5/9/2024  2:40 PM Carmelina Juan MD [facility] CARDIO [place]   5/24/2024 11:00 AM Arnav Aquino MD [facility] UROLOGY [facility]   10/17/2024  9:30 AM LAB, [facility] [place]   10/25/2024  9:30 AM Arnel Flaherty MD [facility] PRICRE [place]       Arnel Flaherty MD  [facility]

## 2024-04-18 NOTE — PROGRESS NOTES
Labs reviewed during visit but herpes antibody panel hadn't resulted yet.  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  This may indicate exposure in the past but not active infection.  I suspect he should be having ulcers form periodically if he has an active infection.

## 2024-04-22 ENCOUNTER — HOSPITAL ENCOUNTER (OUTPATIENT)
Dept: RADIOLOGY | Facility: HOSPITAL | Age: 69
Discharge: HOME | End: 2024-04-22
Attending: ORTHOPAEDIC SURGERY
Payer: MEDICARE

## 2024-04-22 ENCOUNTER — OFFICE VISIT (OUTPATIENT)
Dept: SPORTS MEDICINE | Facility: CLINIC | Age: 69
End: 2024-04-22
Payer: MEDICARE

## 2024-04-22 VITALS
BODY MASS INDEX: 27.12 KG/M2 | DIASTOLIC BLOOD PRESSURE: 70 MMHG | HEIGHT: 70 IN | HEART RATE: 56 BPM | SYSTOLIC BLOOD PRESSURE: 132 MMHG | WEIGHT: 189.44 LBS

## 2024-04-22 VITALS
HEIGHT: 70 IN | HEART RATE: 56 BPM | WEIGHT: 189.38 LBS | BODY MASS INDEX: 27.11 KG/M2 | SYSTOLIC BLOOD PRESSURE: 132 MMHG | DIASTOLIC BLOOD PRESSURE: 70 MMHG

## 2024-04-22 DIAGNOSIS — G89.29 CHRONIC PAIN OF LEFT KNEE: Primary | ICD-10-CM

## 2024-04-22 DIAGNOSIS — M23.204 OLD TEAR OF MEDIAL MENISCUS OF LEFT KNEE, UNSPECIFIED TEAR TYPE: ICD-10-CM

## 2024-04-22 DIAGNOSIS — M25.562 CHRONIC PAIN OF LEFT KNEE: ICD-10-CM

## 2024-04-22 DIAGNOSIS — M25.562 CHRONIC PAIN OF LEFT KNEE: Primary | ICD-10-CM

## 2024-04-22 DIAGNOSIS — M17.12 PRIMARY OSTEOARTHRITIS OF LEFT KNEE: ICD-10-CM

## 2024-04-22 DIAGNOSIS — G89.29 CHRONIC PAIN OF LEFT KNEE: ICD-10-CM

## 2024-04-22 PROCEDURE — 73564 X-RAY EXAM KNEE 4 OR MORE: CPT | Mod: TC,50

## 2024-04-22 PROCEDURE — 3008F BODY MASS INDEX DOCD: CPT | Mod: CPTII,S$GLB,, | Performed by: ORTHOPAEDIC SURGERY

## 2024-04-22 PROCEDURE — 3288F FALL RISK ASSESSMENT DOCD: CPT | Mod: CPTII,S$GLB,, | Performed by: ORTHOPAEDIC SURGERY

## 2024-04-22 PROCEDURE — 99204 OFFICE O/P NEW MOD 45 MIN: CPT | Mod: S$GLB,,, | Performed by: ORTHOPAEDIC SURGERY

## 2024-04-22 PROCEDURE — 1159F MED LIST DOCD IN RCRD: CPT | Mod: CPTII,S$GLB,, | Performed by: ORTHOPAEDIC SURGERY

## 2024-04-22 PROCEDURE — 1126F AMNT PAIN NOTED NONE PRSNT: CPT | Mod: CPTII,S$GLB,, | Performed by: ORTHOPAEDIC SURGERY

## 2024-04-22 PROCEDURE — 99999 PR PBB SHADOW E&M-EST. PATIENT-LVL II: CPT | Mod: PBBFAC,,, | Performed by: ORTHOPAEDIC SURGERY

## 2024-04-22 PROCEDURE — 1101F PT FALLS ASSESS-DOCD LE1/YR: CPT | Mod: CPTII,S$GLB,, | Performed by: ORTHOPAEDIC SURGERY

## 2024-04-22 PROCEDURE — 99499 UNLISTED E&M SERVICE: CPT | Mod: CSM,S$GLB,, | Performed by: ORTHOPAEDIC SURGERY

## 2024-04-22 PROCEDURE — 99999 PR PBB SHADOW E&M-EST. PATIENT-LVL III: CPT | Mod: PBBFAC,,, | Performed by: ORTHOPAEDIC SURGERY

## 2024-04-22 PROCEDURE — 3078F DIAST BP <80 MM HG: CPT | Mod: CPTII,S$GLB,, | Performed by: ORTHOPAEDIC SURGERY

## 2024-04-22 PROCEDURE — 4010F ACE/ARB THERAPY RXD/TAKEN: CPT | Mod: CPTII,S$GLB,, | Performed by: ORTHOPAEDIC SURGERY

## 2024-04-22 PROCEDURE — 3075F SYST BP GE 130 - 139MM HG: CPT | Mod: CPTII,S$GLB,, | Performed by: ORTHOPAEDIC SURGERY

## 2024-04-22 PROCEDURE — 73564 X-RAY EXAM KNEE 4 OR MORE: CPT | Mod: 26,50,, | Performed by: RADIOLOGY

## 2024-04-22 PROCEDURE — 0232T NJX PLATELET PLASMA: CPT | Mod: CSM,,, | Performed by: ORTHOPAEDIC SURGERY

## 2024-04-22 NOTE — PROGRESS NOTES
PRE-PROCEDURE DIAGNOSIS and   PATIENT INDICATIONS  Giovanni Moncada, a 68 y.o. male, presents today with:   Chronic pain of left knee  Primary osteoarthritis of left knee  Old tear of medial meniscus of left knee, unspecified tear type    PROCEDURE PERFORMED  Platelet rich plasma (PRP) injection performed using ultrasound guidance for exact placement of orthobiologic at pathologic site(s)    POTENTIAL RISKS AND BENEFITS  We discussed that this is generally a well-tolerated and safe procedure. Even so, as with any invasive medical procedure, there may be associated rare risks. These risks were discussed in detail. The patient agreed to proceed with this procedure. Please see the electronic medical record for full written and signed patient consent documentation.    DESCRIPTION OF PROCEDURE  A) Procedural time out  A procedural time out was performed, including verification of patient ID, procedure to be performed, site and side/laterality, availability of patient information, review of safety issues, and the patients agreement and consent.     B) Phlebotomy and autograft platelet harvest, and PRP preparation  Sterile technique was used to phlebotomize 120mL of the patients blood from a peripheral vein. This blood was  into density-divided layers using an [employer] Bradley centrifuge. The layer of LEUKOCYTE RICH hematocrit 2%) PRP, a volume drawn up to 5mL (with PPP, if needed) per site/joint, was placed into a sterile syringe in preparation for injection.     C) Platelet rich plasma delivery to pathologic site  Injection site and laterality: left KNEE  Using 1) physical examination and 2) musculoskeletal ultrasound, the anatomy was visualized and the diseased tissue was identified and confirmed. The procedure site was marked with a skin marker. The patient was placed in position maximizing 1) physician access to pathologic tissue and 2) patient comfort. The skin about the area was sterilized with Betadyne.  The site of needle insertion was anesthetized using vapocoolant. Under ultrasound guidance, the needle was advanced to the site of tissue pathology, and the injectate was deposited under direct visualization.    POST-PROCEDURE DIAGNOSIS  Chronic pain of left knee  Primary osteoarthritis of left knee  Old tear of medial meniscus of left knee, unspecified tear type    POST-PROCEDURE CARE  Following the procedure, a sterile bandage was placed over the injection site.    Complications: none     Estimated blood loss from injection procedure: none       DISPOSITION  The patient tolerated the procedure well and there were no immediate complications. The patient was instructed to call the clinic immediately for any mild to moderate adverse side effects, or to call 911 in the event of an emergency.        Description of ultrasound utilization for needle / probe guidance  Ultrasound guidance was used for needle / probe localization. Images (and videos, if appropriate) were saved and stored for documentation. Dynamic visualization of the needle / probe was continuous throughout the procedure.

## 2024-04-22 NOTE — PROGRESS NOTES
CC: Left knee pain    68 y.o. Male with a history of Left knee pain who He states that the pain is severe and not responding to any conservative care. 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 His left knee has been bothering him for many years. He saw Dr Reid in 2011 where an MRI showed tricompartmental OA and a medial meniscus tear. He was given a brace which he has been wearing for years. He enjoys playing [employer] and over the last 4 years he hasn't been able to play due to pain in his knee. Most of his pain is located medially in the knee. Endorses painful clicking and popping. Treatment thus far has included bracing, activity modification, massage work.     + mechanical symptoms, no instability    SANE: 80  VAS 1/10    Review of Systems   Constitution: Negative. Negative for chills, fever and night sweats.   HENT: Negative for congestion and headaches.    Eyes: Negative for blurred vision, left vision loss and right vision loss.   Cardiovascular: Negative for chest pain and syncope.   Respiratory: Negative for cough and shortness of breath.    Endocrine: Negative for polydipsia, polyphagia and polyuria.   Hematologic/Lymphatic: Negative for bleeding problem. Does not bruise/bleed easily.   Skin: Negative for dry skin, itching and rash.   Musculoskeletal: Negative for falls. Positive for knee pain and muscle weakness.   Gastrointestinal: Negative for abdominal pain and bowel incontinence.   Genitourinary: Negative for bladder incontinence and nocturia.   Neurological: Negative for disturbances in coordination, loss of balance and seizures.   Psychiatric/Behavioral: Negative for depression. The patient does not have insomnia.    Allergic/Immunologic: Negative for hives and persistent infections.     PAST MEDICAL HISTORY:   Past Medical History:   Diagnosis Date    Myalgia     PRA INJECT TRIGGER POINTS, > 3  [place] METHYLPREDNISOLONE 40 MG INJ     PAST SURGICAL HISTORY:   Past  Surgical History:   Procedure Laterality Date     colonoscopy  01/07/2016    one 4 mm poly in the cecum. one 3 mm poly in the ascending colon. Repeat colonosocpy in 5 years    CATARACT EXTRACTION, BILATERAL  02/2020    CHOLECYSTECTOMY  2020    colonoscopy  01/26/2021    ESOPHAGOGASTRODUODENOSCOPY N/A 11/17/2023    Procedure: EGD (ESOPHAGOGASTRODUODENOSCOPY);  Surgeon: Arnav Brown MD;  Location: [address]);  Service: Endoscopy;  Laterality: N/A;  referral dr gordon  pt on liquid marijuana  prep instructions sent to pt via portal    EYE SURGERY  2020    HERNIA REPAIR  2020    NECK SURGERY      TONSILLECTOMY  1964     FAMILY HISTORY:   Family History   Problem Relation Name Age of Onset    Cancer Mother Darleen         Lung cancer    Heart disease Father Silviano     Colon cancer Neg Hx      Esophageal cancer Neg Hx       SOCIAL HISTORY:   Social History     Socioeconomic History    Marital status:    Tobacco Use    Smoking status: Never    Smokeless tobacco: Never   Substance and Sexual Activity    Alcohol use: Yes     Alcohol/week: 8.0 standard drinks of alcohol     Types: 4 Glasses of wine, 4 Drinks containing 0.5 oz of alcohol per week    Drug use: Yes     Frequency: 3.0 times per week     Types: Marijuana     Comment: liquid marijuana, before bed    Sexual activity: Yes     Partners: Female     Birth control/protection: None   Social History Narrative    N/A per the patient      Social Determinants of Health     Financial Resource Strain: Low Risk  (4/26/2023)    Overall Financial Resource Strain (CARDIA)     Difficulty of Paying Living Expenses: Not hard at all   Food Insecurity: No Food Insecurity (4/26/2023)    Hunger Vital Sign     Worried About Running Out of Food in the Last Year: Never true     Ran Out of Food in the Last Year: Never true   Transportation Needs: No Transportation Needs (4/26/2023)    PRAPARE - Transportation     Lack of Transportation (Medical): No     Lack of Transportation  (Non-Medical): No   Stress: No Stress Concern Present (4/26/2023)    [employer] - Occupational Stress Questionnaire     Feeling of Stress : Not at all   Social Connections: Unknown (4/26/2023)    Social Connection and Isolation Panel [NHANES]     Marital Status:    Housing Stability: Unknown (4/26/2023)    Housing Stability Vital Sign     Unable to Pay for Housing in the Last Year: No     Unstable Housing in the Last Year: No       MEDICATIONS:   Current Outpatient Medications:     a-cysteine/arg zinc/glut/mv-mn (L GLUTAMINE-N ACET-ARG-VIT-MIN ORAL), Take by mouth., Disp: , Rfl:     acebutoloL (SECTRAL) 200 MG capsule, Take 1 capsule (200 mg total) by mouth 2 (two) times daily., Disp: 180 capsule, Rfl: 3    amLODIPine (NORVASC) 5 MG tablet, Take 1 tablet (5 mg total) by mouth every morning., Disp: 90 tablet, Rfl: 3    ascorbic acid, vitamin C, (VITAMIN C) 1000 MG tablet, Take 1,000 mg by mouth., Disp: , Rfl:     aspirin (ECOTRIN) 81 MG EC tablet, Take 81 mg by mouth once daily., Disp: , Rfl:     atorvastatin (LIPITOR) 10 MG tablet, Take 1 tablet (10 mg total) by mouth nightly., Disp: 90 tablet, Rfl: 3    bacillus coagulans-inulin 1 billion-250 cell-mg Cap, Take by mouth., Disp: , Rfl:     cholecalciferol, vitamin D3, 5,000 unit Tab, Take by mouth., Disp: , Rfl:     ciclopirox (LOPROX) 0.77 % Crea, Apply topically 2 (two) times daily. Use for 2-4 weeks to rash on buttocks and to rash on feet when needed until resolved, Disp: 90 g, Rfl: 1    clobetasoL (TEMOVATE) 0.05 % external solution, Apply to affected area on scalp daily for 2 weeks, then three times weekly, Disp: 50 mL, Rfl: 5    coenzyme Q10 100 mg capsule, Take 100 mg by mouth once daily., Disp: , Rfl:     cyanocobalamin (VITAMIN B-12) 1000 MCG tablet, Take 100 mcg by mouth once daily., Disp: , Rfl:     doxazosin (CARDURA) 1 MG tablet, Take 1 tablet (1 mg total) by mouth nightly., Disp: 90 tablet, Rfl: 3    fluticasone  "propionate (FLONASE) 50 mcg/actuation nasal spray, 1 spray (50 mcg total) by Each Nostril route once daily., Disp: 48 g, Rfl: 3    glucosamine-chondroit-vit C-Mn 500-400 mg capsule, Take by mouth., Disp: , Rfl:     GLUCOSAMINE/CHONDR MARI A SOD (OSTEO BI-FLEX ORAL), Take 1,500 mg by mouth., Disp: , Rfl:     ketoconazole (NIZORAL) 2 % shampoo, Apply topically 3 (three) times a week., Disp: 120 mL, Rfl: 5    magnesium oxide (MAG-OX) 400 mg (241.3 mg magnesium) tablet, Take 1 twice a day for 1 month, then 1 daily; if diarrhea occurs, decrease dose, Disp: , Rfl: 0    olmesartan (BENICAR) 20 MG tablet, TAKE 1 TABLET BY MOUTH EVERY EVENING, Disp: 90 tablet, Rfl: 1    omega-3 acid ethyl esters (LOVAZA) 1 gram capsule, Take 2 capsules (2 g total) by mouth 2 (two) times daily., Disp: 360 capsule, Rfl: 3    omeprazole (PRILOSEC) 20 MG capsule, Take 1 capsule (20 mg total) by mouth once daily., Disp: 90 capsule, Rfl: 3    sildenafiL (VIAGRA) 100 MG tablet, Take 1 tablet (100 mg total) by mouth daily as needed for Erectile Dysfunction., Disp: 90 tablet, Rfl: 3    tadalafiL (CIALIS) 20 MG Tab, Take 1 tablet (20 mg total) by mouth daily as needed., Disp: 30 tablet, Rfl: 11    UNABLE TO FIND, Chew-8-1 CBD:THC (wellness chews) 1 chew once to twice a day, Disp: , Rfl:     UNABLE TO FIND, 1:1 cbd:thc tincture 25ml to 1ml under tongue 3xday as needed, Disp: , Rfl:     vardenafiL (LEVITRA) 20 MG tablet, , Disp: , Rfl:     vitamin A acetate 10,000 unit Subl, Place 2,400 mcg under the tongue., Disp: , Rfl:     zinc acetate 50 mg (zinc) Cap, Take 50 mg by mouth., Disp: , Rfl:   ALLERGIES:   Review of patient's allergies indicates:   Allergen Reactions    Lamisil [terbinafine] Hives       VITAL SIGNS: /70   Pulse (!) 56   Ht 5' 10" (1.778 m)   Wt 85.9 kg (189 lb 7.1 oz)   BMI 27.18 kg/m²      PHYSICAL EXAMINATION  VITAL SIGNS: /70   Pulse (!) 56   Ht 5' 10" (1.778 m)   Wt 85.9 kg (189 lb 7.1 oz)   BMI 27.18 kg/m²  "   General:  The patient is alert and oriented x 3.  Mood is pleasant.  Observation of ears, eyes and nose reveal no gross abnormalities.  HEENT: NCAT, sclera nonicteric  Lungs: Respirations are equal and unlabored.    Left KNEE EXAMINATION     OBSERVATION / INSPECTION   Gait:   Nonantalgic   Alignment:  Moderate varus bilaterally   Scars:   None   Muscle atrophy: Mild  Effusion:  None   Warmth:  None   Discoloration:   none     TENDERNESS / CREPITUS (T / C):          T / C      T / C   Patella   - / -   Lateral joint line   - / -    Peripatellar medial  -  Medial joint line    + / +    Peripatellar lateral -  Medial plica   - / -    Patellar tendon -   Popliteal fossa  - / -    Quad tendon   -   Gastrocnemius   -   Prepatellar Bursa - / +   Quadricep   -   Tibial tubercle  -  Thigh/hamstring  -   Pes anserine/HS -  Fibula    -   ITB   - / -  Tibia     -   Tib/fib joint  - / -  LCL    -     MFC   - / -   MCL: Proximal  -    LFC   - / -    Distal   -          ROM: (* = pain)  PASSIVE   ACTIVE    Left :   0 / 0 / 145   0 / 0 / 145     Right :    0 / 0 / 145   0 / 0 / 145    PATELLOFEMORAL EXAMINATION:  See above noted areas of tenderness.   Patella position    Subluxation / dislocation: Centered           Sup. / Inf;   Normal   Crepitus (PF):    Absent   Patellar Mobility:       Medial-lateral:   Normal    Superior-inferior:  Normal    Inferior tilt   Normal    Patellar tendon:  Normal   Lateral tilt:    Normal   J-sign:     None   Patellofemoral grind:   No pain       MENISCAL SIGNS:     Pain on terminal extension:  -  Pain on terminal flexion:  +  Rudolphs maneuver:  + for pain  Squat     + posterior joint pain    LIGAMENT EXAMINATION:  ACL / Lachman:  normal (-1 to 2mm)    PCL-Post.  drawer: normal 0 to 2mm  MCL- Valgus:  normal 0 to 2mm  LCL- Varus:  normal 0 to 2mm  Pivot shift: normal (Equal)   Dial Test: difference c/w other side   At 30° flexion: normal (< 5°)    At 90° flexion: normal (< 5°)   Reverse  Pivot Shift:   normal (Equal)     STRENGTH: (* = with pain) PAINFUL SIDE   Quadricep   5/5   Hamstrin/5    EXTREMITY NEURO-VASCULAR EXAMINATION:   Sensation:  Grossly intact to light touch all dermatomal regions.   Motor Function:  Fully intact motor function at hip, knee, foot and ankle    DTRs;  quadriceps and  achilles 2+.  No clonus and downgoing Babinski.    Vascular status:  DP and PT pulses 2+, brisk capillary refill, symmetric.        X-rays reviewed and interpreted personally by me:  including standing, weight bearing AP and flexion bilateral knees, lateral and merchant views ordered and images reviewed by me show:  No fracture, dislocation. Degenerative changes noted bilaterally, most pronounced in the medial and patellofemoral compartments.      ASSESSMENT:    Left Knee osteoarthritis, old medial meniscus tear      PLAN:   -after a discussion of treatment options, patient would like to proceed with PRP injection left knee  -instructed to withhold NSAIDs 5 days prior to procedure, drink adequate amount of water to stay hydrated   -PT at [facility]  -follow up for injection (PRP)    All questions were answered, pt will contact us for questions or concerns in the interim.    I made the decision to obtain old records of the patient including previous notes and imaging. New imaging was ordered today of the extremity or extremities evaluated. I independently reviewed and interpreted the radiographs and/or MRIs today as well as prior imaging.

## 2024-04-29 ENCOUNTER — CLINICAL SUPPORT (OUTPATIENT)
Dept: REHABILITATION | Facility: HOSPITAL | Age: 69
End: 2024-04-29
Payer: MEDICARE

## 2024-04-29 DIAGNOSIS — M25.562 CHRONIC PAIN OF LEFT KNEE: Primary | ICD-10-CM

## 2024-04-29 DIAGNOSIS — G89.29 CHRONIC PAIN OF LEFT KNEE: Primary | ICD-10-CM

## 2024-04-29 DIAGNOSIS — M23.204 OLD TEAR OF MEDIAL MENISCUS OF LEFT KNEE, UNSPECIFIED TEAR TYPE: ICD-10-CM

## 2024-04-29 DIAGNOSIS — M24.559 HIP FLEXOR TENDON TIGHTNESS, UNSPECIFIED LATERALITY: ICD-10-CM

## 2024-04-29 DIAGNOSIS — R26.9 GAIT ABNORMALITY: ICD-10-CM

## 2024-04-29 DIAGNOSIS — M17.12 PRIMARY OSTEOARTHRITIS OF LEFT KNEE: ICD-10-CM

## 2024-04-29 DIAGNOSIS — R26.89 BALANCE PROBLEM: ICD-10-CM

## 2024-04-29 PROCEDURE — 97161 PT EVAL LOW COMPLEX 20 MIN: CPT

## 2024-04-29 PROCEDURE — 97110 THERAPEUTIC EXERCISES: CPT

## 2024-04-29 NOTE — PLAN OF CARE
[facility]  Physical Therapy Initial Evaluation    Name: Giovanni Moncada  Clinic Number: [id number]    Therapy Diagnosis:   Encounter Diagnoses   Name Primary?    Primary osteoarthritis of left knee     Old tear of medial meniscus of left knee, unspecified tear type     Chronic pain of left knee Yes    Gait abnormality     Hip flexor tendon tightness, unspecified laterality     Balance problem      Physician: Cesar Quintana MD  Physician Orders: PT Eval and Treat  Medical Diagnosis from Referral:   Diagnosis   M17.12 (ICD-10-CM) - Primary osteoarthritis of left knee   M23.204 (ICD-10-CM) - Old tear of medial meniscus of left knee, unspecified tear type     Evaluation Date: 4/29/2024  Authorization Period Expiration: 12/31/2024  Plan of Care Expiration: 7/22/2024  Progress Note Due: 5/27/2024  Visit # / Visits authorized: 1/1   FOTO Follow Up: #1 given at [facility]   FOTO Follow UP:     Time In: 10:00 am   Time Out: 11:00 am  Total Appointment Time (timed & untimed codes): 60 minutes    S/p: PRP injection 4/22  Post-Op Precautions: none    Precautions: none    Subjective     Date of onset: exacerbation of knee pain 2 weeks ago  History of current condition - Sb reports: he has had knee pain for the last couple years on and off but it has not limited him. About 2 weeks ago he had an exacerbation of medial knee pain which he cannot attribute to any KIRK. He gets a massage 2x/wk and he says that has helped his knee pain improve by 80%. He states his knee is not limiting him at this time. He hopes to return to playing [employer] which he stopped playing due to his knee pain.      Imaging:  X-ray: No acute fractures, interval progression in bilateral knee osteoarthritic changes when compared to earlier exam as noted.    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   Prior Level of Function: racquetball, [id number] steps/day with intermittent knee pain  Current Level of Function: not playing racquetball due to knee pain    Pain:  Current: not asked   Location: left knee  Description: Aching, Sharp, and Variable  Aggravating Factors: Walking, Flexing, and putting pressure on his medial knee when sleeping on his side  Easing Factors: massage    Pts goals: return to ADLs & racquetball with no pain      Medical History:   Past Medical History:   Diagnosis Date    Myalgia     PRA INJECT TRIGGER POINTS, > 3  [place] METHYLPREDNISOLONE 40 MG INJ       Surgical History:   Giovanni Moncada  has a past surgical history that includes Neck surgery; Cataract extraction, bilateral (02/2020);  colonoscopy (01/07/2016); colonoscopy (01/26/2021); Eye surgery (2020); Hernia repair (2020); Tonsillectomy (1964); Cholecystectomy (2020); and Esophagogastroduodenoscopy (N/A, 11/17/2023).    Medications:   Giovanni has a current medication list which includes the following prescription(s): a-cysteine/arg zinc/glut/mv-mn, acebutolol, amlodipine, ascorbic acid (vitamin c), aspirin, atorvastatin, bacillus coagulans-inulin, cholecalciferol (vitamin d3), ciclopirox, clobetasol, coenzyme q10, cyanocobalamin, doxazosin, fluticasone propionate, glucosamine-chondroit-vit c-mn, glucosamine/chondr christie a sod, ketoconazole, magnesium oxide, olmesartan, omega-3 acid ethyl esters, omeprazole, sildenafil, tadalafil, UNABLE TO FIND, UNABLE TO FIND, vardenafil, vitamin a acetate, and zinc acetate.    Allergies:   Review of patient's allergies indicates:   Allergen Reactions    Lamisil [terbinafine] Hives        Objective       Gait: genu varum, right trunk lean, early heel off    Squat: increased depth, quad dominant, right trunk lean    Single Leg Squat: femoral adduction/internal rotation on L, quad dominant     Single leg balance: sway L > R, ankle strategy       Knee Active Range of Motion:   Right   "Left    Flexion 140 140   Extension 5 0     Knee Passive Range of Motion:   Right  Left    Flexion 140 40   Extension 5 0     Joint Mobility: WNL     Edema: none    Special Tests:   - Charbel test - 1 in from neutral bilaterally     Strength:     Lower Extremity Strength  Right LE  Left LE    Knee extension: 5/5 Knee extension: 5/5   Knee flexion: 5/5 Knee flexion: 5/5   Hip flexion: 4/5 Hip flexion: 4+/5   Hip extension:  3+/5 Hip extension: 3+/5   Hip abduction: 4+/5 Hip abduction: 4+/5   Hip adduction: NT Hip adduction NT   Ankle dorsiflexion: 4+/5 Ankle dorsiflexion: 4+/5   Ankle plantarflexion: 4+/5 Ankle plantarflexion: 4+/5     HHD knee extension Right Left * % deficit    Trial 1  87.1 96                 1.4% deficit   Trial 2  105.4 94    Trial 3 103.7 102    Average 98.7 lbs 97.3 lbs        CMS Impairment/Limitation/Restriction for FOTO Knee Survey    Therapist reviewed FOTO scores for Giovanni Moncada on 4/29/2024.   FOTO documents entered into [employer] - see Media section.    Limitation Score: 41%  Predicted score: 25%       Treatment     Treatment Time In: 10:45 am  Treatment Time Out: 11:00 am  Total Treatment time separate from Evaluation: 15 minutes     Sb received the treatments listed below:      Therapeutic exercises to develop strength, endurance, ROM, flexibility, posture, and core stabilization for 15 minutes including:  Side steps, BTB at ankles, x 1/2 turf lap   Bridges, BTB x 20 x 3"   Standing hip flexor stretch       Home Exercises and Patient Education Provided     Education provided:   - HEP  - Prognosis, activity modification, goals for therapy, role of therapy for care, exercises/HEP    Written Home Exercises Provided: side steps, glute bridge, hip flexor stretch.  Exercises were reviewed and Sb was able to demonstrate them prior to the end of the session.   Pt received a written copy of exercises to perform at home. Sb demonstrated good  understanding of the education provided. "     See EMR under patient instructions for exercises given.     Assessment     Giovanni is a 68 y.o. male referred to outpatient Physical Therapy with diagnosis of primary osteoarthritis of left knee and old tear of medial meniscus left knee after PRP injection. Patient reports history of left knee pain that was exacerbated a few weeks ago with no KIRK. Patient demo deficits in gait mechanics, hip extension strength and hip flexor flexibility. Pt will benefit from skilled outpatient Physical Therapy to address the deficits stated above and in the chart below, provide pt/family education, and to maximize pt's level of independence.       Pt prognosis is Good.     Plan of care discussed with patient: Yes  Pt's spiritual, cultural and educational needs considered and patient is agreeable to the plan of care and goals as stated below:       Anticipated Barriers for therapy: compliance     Medical Necessity is demonstrated by the following  History  Co-morbidities and personal factors that may impact the plan of care [] LOW: no personal factors / co-morbidities  [] MODERATE: 1-2 personal factors / co-morbidities  [x] HIGH: 3+ personal factors / co-morbidities    Moderate / High Support Documentation:   Co-morbidities affecting plan of care: HTN, HLD, BPH, GERD, hx of squamous cell carcinoma of neck    Personal Factors:   no deficits     Examination  Body Structures and Functions, activity limitations and participation restrictions that may impact the plan of care [] LOW: addressing 1-2 elements  [] MODERATE: 3+ elements  [x] HIGH: 4+ elements (please support below)    Moderate / High Support Documentation: strength, ROM, flexibility, gait, movement analysis     Clinical Presentation [x] LOW: stable  [] MODERATE: Evolving  [] HIGH: Unstable     Decision Making/ Complexity Score: low         Goals:  Short Term Goals: 4-6 weeks  1. Pt will be compliant with HEP 50% of prescribed amount.   2. Pt will improve hip extension  strength to 4/5 MMT to improve functional gait deviation   3. Report decreased L knee pain  <   / =  2  /10  to increase tolerance for adls, work     Long Term Goals: 8-10 weeks   Pt will be compliant with % of prescribed amount.   Patient will improve their FOTO limitation score to at least 25% as evidence of clinically significant improvements in their function.  The pt to demo pain free and uncompensated gait mechanics  The pt will report full participation in ADLs and IADLs without restrictions related to L knee.       Plan   Plan of care Certification: 4/29/2024 to 7/22/2024.    Outpatient Physical Therapy 1 times weekly for 12 weeks to include the following interventions: Electrical Stimulation NMES, Gait Training, Manual Therapy, Neuromuscular Re-ed, Patient Education, Self Care, Therapeutic Activities, and Therapeutic Exercise.     Karly Cuellar, PT, DPT    Co-treated with Tamia Tarango, SPT    I certify that I was present in the room directing the student in service delivery and guiding them using my skilled judgment. As the co-signing therapist I have reviewed the students documentation and am responsible for the treatment, assessment, and plan.

## 2024-05-06 DIAGNOSIS — I10 ESSENTIAL HYPERTENSION: ICD-10-CM

## 2024-05-06 RX ORDER — DOXAZOSIN 1 MG/1
1 TABLET ORAL NIGHTLY
Qty: 90 TABLET | Refills: 3 | Status: SHIPPED | OUTPATIENT
Start: 2024-05-06

## 2024-05-06 NOTE — TELEPHONE ENCOUNTER
Refill Decision Note   Giovanni Moncada  is requesting a refill authorization.  Brief Assessment and Rationale for Refill:  Approve     Medication Therapy Plan:         Comments:     Note composed:12:18 PM 05/06/2024

## 2024-05-06 NOTE — TELEPHONE ENCOUNTER
No care due was identified.  [facility] Embedded Care Due Messages. Reference number: [id number].   5/06/2024 8:46:44 AM CDT

## 2024-05-08 ENCOUNTER — TELEPHONE (OUTPATIENT)
Dept: PRIMARY CARE CLINIC | Facility: CLINIC | Age: 69
End: 2024-05-08
Payer: MEDICARE

## 2024-05-08 NOTE — TELEPHONE ENCOUNTER
----- Message from Shandra Champion sent at 5/8/2024  3:01 PM CDT -----  Contact: Ms. Suárez  Phone# [phone]  Ms. Suárez a  provider at [facility] said that you were suppose to be faxing over a referral for a hearing test for the patient but it never was faxed over to her.     Please fax referral to [facility]  @ fax# [phone].

## 2024-05-09 ENCOUNTER — OFFICE VISIT (OUTPATIENT)
Dept: CARDIOLOGY | Facility: CLINIC | Age: 69
End: 2024-05-09
Payer: MEDICARE

## 2024-05-09 VITALS
HEIGHT: 70 IN | BODY MASS INDEX: 26.77 KG/M2 | DIASTOLIC BLOOD PRESSURE: 81 MMHG | WEIGHT: 187 LBS | SYSTOLIC BLOOD PRESSURE: 136 MMHG | HEART RATE: 52 BPM

## 2024-05-09 DIAGNOSIS — E78.2 HYPERLIPIDEMIA, MIXED: ICD-10-CM

## 2024-05-09 DIAGNOSIS — I49.5 SICK SINUS SYNDROME: ICD-10-CM

## 2024-05-09 DIAGNOSIS — I10 ESSENTIAL HYPERTENSION: ICD-10-CM

## 2024-05-09 DIAGNOSIS — R00.2 PALPITATIONS: ICD-10-CM

## 2024-05-09 DIAGNOSIS — I49.3 PVC (PREMATURE VENTRICULAR CONTRACTION): Primary | ICD-10-CM

## 2024-05-09 PROCEDURE — 99214 OFFICE O/P EST MOD 30 MIN: CPT | Mod: S$GLB,,, | Performed by: INTERNAL MEDICINE

## 2024-05-09 PROCEDURE — 1126F AMNT PAIN NOTED NONE PRSNT: CPT | Mod: CPTII,S$GLB,, | Performed by: INTERNAL MEDICINE

## 2024-05-09 PROCEDURE — 4010F ACE/ARB THERAPY RXD/TAKEN: CPT | Mod: CPTII,S$GLB,, | Performed by: INTERNAL MEDICINE

## 2024-05-09 PROCEDURE — 3008F BODY MASS INDEX DOCD: CPT | Mod: CPTII,S$GLB,, | Performed by: INTERNAL MEDICINE

## 2024-05-09 PROCEDURE — 3288F FALL RISK ASSESSMENT DOCD: CPT | Mod: CPTII,S$GLB,, | Performed by: INTERNAL MEDICINE

## 2024-05-09 PROCEDURE — 1160F RVW MEDS BY RX/DR IN RCRD: CPT | Mod: CPTII,S$GLB,, | Performed by: INTERNAL MEDICINE

## 2024-05-09 PROCEDURE — 99999 PR PBB SHADOW E&M-EST. PATIENT-LVL V: CPT | Mod: PBBFAC,,, | Performed by: INTERNAL MEDICINE

## 2024-05-09 PROCEDURE — 3075F SYST BP GE 130 - 139MM HG: CPT | Mod: CPTII,S$GLB,, | Performed by: INTERNAL MEDICINE

## 2024-05-09 PROCEDURE — 3079F DIAST BP 80-89 MM HG: CPT | Mod: CPTII,S$GLB,, | Performed by: INTERNAL MEDICINE

## 2024-05-09 PROCEDURE — 1101F PT FALLS ASSESS-DOCD LE1/YR: CPT | Mod: CPTII,S$GLB,, | Performed by: INTERNAL MEDICINE

## 2024-05-09 PROCEDURE — 1159F MED LIST DOCD IN RCRD: CPT | Mod: CPTII,S$GLB,, | Performed by: INTERNAL MEDICINE

## 2024-05-09 RX ORDER — ATORVASTATIN CALCIUM 20 MG/1
20 TABLET, FILM COATED ORAL NIGHTLY
Qty: 90 TABLET | Refills: 3 | Status: SHIPPED | OUTPATIENT
Start: 2024-05-09

## 2024-05-09 NOTE — PROGRESS NOTES
HISTORY:    68-year-old male with a history of hypertension, hyperlipidemia, PVCs, SCC of the neck status post surgery/radiation '00s, DDD presenting for initial evaluation by me.  Seen by my colleague Dr. Brown 1 year ago.    The patient denies any symptoms of chest pain, shortness of breath, or dyspnea on exertion. Occasional palpitations, chronic for years. Non-limiting.     Activity levels are excellent.  The patient walks 12,000-19,000 steps per day.  Owns a lot of property in the [facility] and walks the properties a lot. Eats healthy, avoids processed foods.     The patient denies any previous history of myocardial infarction, coronary artery disease, peripheral arterial disease, stroke, congestive heart failure, or cardiomyopathy.    Tolerates aspirin 81 x 1, acebutolol 200x2, amlodipine 5 x 1, olmesartan 20x1, atorvastatin 10 x 1. Also on doxazosin. Takes a variety of vitamin supplements.     PHYSICAL EXAM:    Vitals:    05/09/24 1454   BP: 136/81   Pulse: (!) 52       NAD, A+Ox3.  No jvd, no bruit.  Regular w some ectopy nml s1,s2. No murmurs.  CTA B no wheezes or crackles.  No edema.    LABS/STUDIES (imaging reviewed during clinic visit):    April 2024 CBC and CMP normal.  /HDL 53/LDL 70/.  ECG May 2024 sinus rhythm with no Q-waves or ST changes.  February 2023 sinus rhythm with PVCs.    Coronary calcium score February 2023 Agatston score of 0.  Carotid 2023 bilateral carotid atherosclerosis with no evidence of significant ICA disease bilaterally.    ASSESSMENT & PLAN:    1. PVC (premature ventricular contraction)    2. Sick sinus syndrome    3. Essential hypertension    4. Hyperlipidemia, mixed    5. Palpitations        Orders Placed This Encounter    Holter monitor - 24 hour    Echo    atorvastatin (LIPITOR) 20 MG tablet        No CV hx or symptoms.    Asymptomatic or minimally symptomatic PVCs on acebutolol 200x2. Check holter/TTE.     Hypertension controlled on acebutolol 200x2,  amlodipine 5x1 and olmesartan 20x1.    Hyperlipidemia on atorvastatin 10x1. Increase atorvastatin to 20x1.     Okay to stop asa from my perspective.    Follow up in about 6 months (around 11/9/2024). - Pt likes 6 months follow-up.       Carmelina Juan MD

## 2024-05-20 ENCOUNTER — OFFICE VISIT (OUTPATIENT)
Dept: SPORTS MEDICINE | Facility: CLINIC | Age: 69
End: 2024-05-20

## 2024-05-20 VITALS — BODY MASS INDEX: 26.51 KG/M2 | WEIGHT: 185.19 LBS | HEIGHT: 70 IN

## 2024-05-20 DIAGNOSIS — M25.562 CHRONIC PAIN OF LEFT KNEE: Primary | ICD-10-CM

## 2024-05-20 DIAGNOSIS — G89.29 CHRONIC PAIN OF LEFT KNEE: Primary | ICD-10-CM

## 2024-05-20 PROCEDURE — 99499 UNLISTED E&M SERVICE: CPT | Mod: CSM,S$GLB,, | Performed by: ORTHOPAEDIC SURGERY

## 2024-05-20 PROCEDURE — 99999 PR PBB SHADOW E&M-EST. PATIENT-LVL II: CPT | Mod: PBBFAC,,, | Performed by: ORTHOPAEDIC SURGERY

## 2024-05-20 PROCEDURE — 0232T NJX PLATELET PLASMA: CPT | Mod: S$GLB,,, | Performed by: ORTHOPAEDIC SURGERY

## 2024-05-20 NOTE — PROCEDURES
PRE-PROCEDURE DIAGNOSIS and   PATIENT INDICATIONS  Giovanni Moncada, a 68 y.o. male, presents today with:   Chronic pain of left knee  Primary osteoarthritis of left knee  Old tear of medial meniscus of left knee, unspecified tear type    PROCEDURE PERFORMED  Platelet rich plasma (PRP) injection performed using ultrasound guidance for exact placement of orthobiologic at pathologic site(s)    POTENTIAL RISKS AND BENEFITS  We discussed that this is generally a well-tolerated and safe procedure. Even so, as with any invasive medical procedure, there may be associated rare risks. These risks were discussed in detail. The patient agreed to proceed with this procedure. Please see the electronic medical record for full written and signed patient consent documentation.    DESCRIPTION OF PROCEDURE  A) Procedural time out  A procedural time out was performed, including verification of patient ID, procedure to be performed, site and side/laterality, availability of patient information, review of safety issues, and the patients agreement and consent.     B) Phlebotomy and autograft platelet harvest, and PRP preparation  Sterile technique was used to phlebotomize 120mL of the patients blood from a peripheral vein. This blood was  into density-divided layers using an [employer] Bradley centrifuge. The layer of LEUKOCYTE RICH hematocrit 2%) PRP, a volume drawn up to 5mL (with PPP, if needed) per site/joint, was placed into a sterile syringe in preparation for injection.     C) Platelet rich plasma delivery to pathologic site  Injection site and laterality: left KNEE  Using 1) physical examination and 2) musculoskeletal ultrasound, the anatomy was visualized and the diseased tissue was identified and confirmed. The procedure site was marked with a skin marker. The patient was placed in position maximizing 1) physician access to pathologic tissue and 2) patient comfort. The skin about the area was sterilized with Betadyne.  The site of needle insertion was anesthetized using vapocoolant. Under ultrasound guidance, the needle was advanced to the site of tissue pathology, and the injectate was deposited under direct visualization.    POST-PROCEDURE DIAGNOSIS  Chronic pain of left knee  Primary osteoarthritis of left knee  Old tear of medial meniscus of left knee, unspecified tear type    POST-PROCEDURE CARE  Following the procedure, a sterile bandage was placed over the injection site.    Complications: none     Estimated blood loss from injection procedure: none       DISPOSITION  The patient tolerated the procedure well and there were no immediate complications. The patient was instructed to call the clinic immediately for any mild to moderate adverse side effects, or to call 911 in the event of an emergency.        Description of ultrasound utilization for needle / probe guidance  Ultrasound guidance was used for needle / probe localization. Images (and videos, if appropriate) were saved and stored for documentation. Dynamic visualization of the needle / probe was continuous throughout the procedure.

## 2024-05-20 NOTE — PROGRESS NOTES
PRP injection 2/3    PRE-PROCEDURE DIAGNOSIS and   PATIENT INDICATIONS  Giovanni Moncada, a 68 y.o. male, presents today with:   Chronic pain of left knee  Primary osteoarthritis of left knee  Old tear of medial meniscus of left knee, unspecified tear type    PROCEDURE PERFORMED  Platelet rich plasma (PRP) injection performed using ultrasound guidance for exact placement of orthobiologic at pathologic site(s)    POTENTIAL RISKS AND BENEFITS  We discussed that this is generally a well-tolerated and safe procedure. Even so, as with any invasive medical procedure, there may be associated rare risks. These risks were discussed in detail. The patient agreed to proceed with this procedure. Please see the electronic medical record for full written and signed patient consent documentation.    DESCRIPTION OF PROCEDURE  A) Procedural time out  A procedural time out was performed, including verification of patient ID, procedure to be performed, site and side/laterality, availability of patient information, review of safety issues, and the patients agreement and consent.     B) Phlebotomy and autograft platelet harvest, and PRP preparation  Sterile technique was used to phlebotomize 120mL of the patients blood from a peripheral vein. This blood was  into density-divided layers using an [employer] Bradley centrifuge. The layer of LEUKOCYTE poor hematocrit 2%) PRP, a volume drawn up to 5mL (with PPP, if needed: 3:PRP, 2 PPP) per site/joint, was placed into a sterile syringe in preparation for injection.     C) Platelet rich plasma delivery to pathologic site  Injection site and laterality: left KNEE  Using 1) physical examination and 2) musculoskeletal ultrasound, the anatomy was visualized and the diseased tissue was identified and confirmed. The procedure site was marked with a skin marker. The patient was placed in position maximizing 1) physician access to pathologic tissue and 2) patient comfort. The skin about the  area was sterilized with Betadyne. The site of needle insertion was anesthetized using vapocoolant. Under ultrasound guidance, the needle was advanced to the site of tissue pathology, and the injectate was deposited under direct visualization.    POST-PROCEDURE DIAGNOSIS  Chronic pain of left knee  Primary osteoarthritis of left knee  Old tear of medial meniscus of left knee, unspecified tear type    POST-PROCEDURE CARE  Following the procedure, a sterile bandage was placed over the injection site.    Complications: none     Estimated blood loss from injection procedure: none       DISPOSITION  The patient tolerated the procedure well and there were no immediate complications. The patient was instructed to call the clinic immediately for any mild to moderate adverse side effects, or to call 911 in the event of an emergency.        Description of ultrasound utilization for needle / probe guidance  Ultrasound guidance was used for needle / probe localization. Images (and videos, if appropriate) were saved and stored for documentation. Dynamic visualization of the needle / probe was continuous throughout the procedure.

## 2024-05-21 NOTE — PROGRESS NOTES
Subjective:      Giovanni Moncada is a 68 y.o. male who returns today regarding his     Doing well.        The following portions of the patient's history were reviewed and updated as appropriate: allergies, current medications, past family history, past medical history, past social history, past surgical history and problem list.    Review of Systems  Pertinent items are noted in HPI.  A comprehensive multipoint review of systems was negative except as otherwise stated in the HPI.    Past Medical History:   Diagnosis Date    Myalgia     PRA INJECT TRIGGER POINTS, > 3  OR METHYLPREDNISOLONE 40 MG INJ     Past Surgical History:   Procedure Laterality Date     colonoscopy  01/07/2016    one 4 mm poly in the cecum. one 3 mm poly in the ascending colon. Repeat colonosocpy in 5 years    CATARACT EXTRACTION, BILATERAL  02/2020    CHOLECYSTECTOMY  2020    colonoscopy  01/26/2021    ESOPHAGOGASTRODUODENOSCOPY N/A 11/17/2023    Procedure: EGD (ESOPHAGOGASTRODUODENOSCOPY);  Surgeon: Arnav Brown MD;  Location: [address]);  Service: Endoscopy;  Laterality: N/A;  referral dr gordon  pt on liquid marijuana  prep instructions sent to pt via portal    EYE SURGERY  2020    HERNIA REPAIR  2020    NECK SURGERY      TONSILLECTOMY  1964       Review of patient's allergies indicates:   Allergen Reactions    Lamisil [terbinafine] Hives          Objective:   Vitals: There were no vitals taken for this visit.    Physical Exam   General: alert and oriented, no acute distress  Respiratory: Symmetric expansion, non-labored breathing  Cardiovascular: no peripheral edema  Abdomen: soft, non distended  Skin: normal coloration and turgor, no rashes, no suspicious skin lesions noted  Neuro: no gross deficits  Psych: normal judgment and insight, normal mood/affect, and non-anxious  ES no nodules  50g    Physical Exam    Lab Review   Urinalysis demonstrates negative for all components  Lab Results   Component Value Date    WBC 5.48  04/16/2024    HGB 14.7 04/16/2024    HCT 45.2 04/16/2024    MCV 94 04/16/2024     04/16/2024     Lab Results   Component Value Date    CREATININE 0.9 04/16/2024    BUN 19 04/16/2024     Lab Results   Component Value Date    PSA 0.90 04/16/2024    PSA 1.1 02/01/2023    PSADIAG 2.1 03/11/2022    PSADIAG 0.96 07/06/2020    PSADIAG 1.5 07/16/2018         Imaging  -    Assessment and Plan:   Enlarged prostate without lower urinary tract symptoms (luts)    Erectile dysfunction, unspecified erectile dysfunction type     Refill sildenafil and cialis  Instructed him not to take both on the same day     See PCP and cards re BP     rtc 1 yr with psa

## 2024-05-24 ENCOUNTER — TELEPHONE (OUTPATIENT)
Dept: UROLOGY | Facility: CLINIC | Age: 69
End: 2024-05-24

## 2024-05-24 ENCOUNTER — OFFICE VISIT (OUTPATIENT)
Dept: UROLOGY | Facility: CLINIC | Age: 69
End: 2024-05-24
Payer: MEDICARE

## 2024-05-24 VITALS
HEART RATE: 53 BPM | HEIGHT: 70 IN | OXYGEN SATURATION: 100 % | SYSTOLIC BLOOD PRESSURE: 124 MMHG | RESPIRATION RATE: 12 BRPM | BODY MASS INDEX: 26.51 KG/M2 | DIASTOLIC BLOOD PRESSURE: 68 MMHG | WEIGHT: 185.19 LBS

## 2024-05-24 DIAGNOSIS — N40.0 ENLARGED PROSTATE WITHOUT LOWER URINARY TRACT SYMPTOMS (LUTS): Primary | ICD-10-CM

## 2024-05-24 PROCEDURE — 1159F MED LIST DOCD IN RCRD: CPT | Mod: CPTII,S$GLB,, | Performed by: UROLOGY

## 2024-05-24 PROCEDURE — 1126F AMNT PAIN NOTED NONE PRSNT: CPT | Mod: CPTII,S$GLB,, | Performed by: UROLOGY

## 2024-05-24 PROCEDURE — 3008F BODY MASS INDEX DOCD: CPT | Mod: CPTII,S$GLB,, | Performed by: UROLOGY

## 2024-05-24 PROCEDURE — 99214 OFFICE O/P EST MOD 30 MIN: CPT | Mod: S$GLB,,, | Performed by: UROLOGY

## 2024-05-24 PROCEDURE — 4010F ACE/ARB THERAPY RXD/TAKEN: CPT | Mod: CPTII,S$GLB,, | Performed by: UROLOGY

## 2024-05-24 PROCEDURE — 3288F FALL RISK ASSESSMENT DOCD: CPT | Mod: CPTII,S$GLB,, | Performed by: UROLOGY

## 2024-05-24 PROCEDURE — 3074F SYST BP LT 130 MM HG: CPT | Mod: CPTII,S$GLB,, | Performed by: UROLOGY

## 2024-05-24 PROCEDURE — 3078F DIAST BP <80 MM HG: CPT | Mod: CPTII,S$GLB,, | Performed by: UROLOGY

## 2024-05-24 PROCEDURE — 1101F PT FALLS ASSESS-DOCD LE1/YR: CPT | Mod: CPTII,S$GLB,, | Performed by: UROLOGY

## 2024-05-24 RX ORDER — TADALAFIL 20 MG/1
20 TABLET ORAL DAILY PRN
Qty: 90 TABLET | Refills: 3 | Status: SHIPPED | OUTPATIENT
Start: 2024-05-24 | End: 2025-05-24

## 2024-05-24 NOTE — TELEPHONE ENCOUNTER
----- Message from Arnav Aquino MD sent at 5/24/2024 11:21 AM CDT -----  See DR Aquino 1 yr with psa please

## 2024-05-29 ENCOUNTER — HOSPITAL ENCOUNTER (OUTPATIENT)
Dept: CARDIOLOGY | Facility: HOSPITAL | Age: 69
Discharge: HOME | End: 2024-05-29
Attending: INTERNAL MEDICINE
Payer: MEDICARE

## 2024-05-29 DIAGNOSIS — I49.3 PVC (PREMATURE VENTRICULAR CONTRACTION): ICD-10-CM

## 2024-05-29 PROCEDURE — 93227 XTRNL ECG REC<48 HR R&I: CPT | Mod: ,,, | Performed by: INTERNAL MEDICINE

## 2024-05-29 PROCEDURE — 93225 XTRNL ECG REC<48 HRS REC: CPT

## 2024-05-30 DIAGNOSIS — I10 ESSENTIAL HYPERTENSION: ICD-10-CM

## 2024-05-30 RX ORDER — AMLODIPINE BESYLATE 5 MG/1
5 TABLET ORAL EVERY MORNING
Qty: 90 TABLET | Refills: 3 | Status: SHIPPED | OUTPATIENT
Start: 2024-05-30

## 2024-05-30 NOTE — TELEPHONE ENCOUNTER
Refill Decision Note   Giovanni Moncada  is requesting a refill authorization.  Brief Assessment and Rationale for Refill:  Approve     Medication Therapy Plan:         Comments:     Note composed:3:45 PM 05/30/2024

## 2024-05-30 NOTE — TELEPHONE ENCOUNTER
No care due was identified.  [facility] Embedded Care Due Messages. Reference number: [id number].   5/30/2024 2:51:55 PM CDT

## 2024-05-31 LAB
OHS CV EVENT MONITOR DAY: 0
OHS CV HOLTER LENGTH DECIMAL HOURS: 24
OHS CV HOLTER LENGTH HOURS: 24
OHS CV HOLTER LENGTH MINUTES: 0
OHS CV HOLTER SINUS AVERAGE HR: 61
OHS CV HOLTER SINUS MAX HR: 108
OHS CV HOLTER SINUS MIN HR: 33

## 2024-06-19 ENCOUNTER — OFFICE VISIT (OUTPATIENT)
Dept: SPORTS MEDICINE | Facility: CLINIC | Age: 69
End: 2024-06-19
Payer: MEDICARE

## 2024-06-19 VITALS
SYSTOLIC BLOOD PRESSURE: 107 MMHG | WEIGHT: 189.94 LBS | HEIGHT: 70 IN | DIASTOLIC BLOOD PRESSURE: 66 MMHG | HEART RATE: 57 BPM | BODY MASS INDEX: 27.19 KG/M2

## 2024-06-19 DIAGNOSIS — M25.562 CHRONIC PAIN OF LEFT KNEE: Primary | ICD-10-CM

## 2024-06-19 DIAGNOSIS — G89.29 CHRONIC PAIN OF LEFT KNEE: Primary | ICD-10-CM

## 2024-06-19 PROCEDURE — 0232T NJX PLATELET PLASMA: CPT | Mod: CSM,,, | Performed by: ORTHOPAEDIC SURGERY

## 2024-06-19 PROCEDURE — 99499 UNLISTED E&M SERVICE: CPT | Mod: CSM,S$GLB,, | Performed by: ORTHOPAEDIC SURGERY

## 2024-06-19 PROCEDURE — 99999 PR PBB SHADOW E&M-EST. PATIENT-LVL III: CPT | Mod: PBBFAC,,, | Performed by: ORTHOPAEDIC SURGERY

## 2024-06-19 NOTE — PROGRESS NOTES
PRP injection 3/3    PRE-PROCEDURE DIAGNOSIS and   PATIENT INDICATIONS  Giovanni Moncada, a 68 y.o. male, presents today with:   Chronic pain of left knee  Primary osteoarthritis of left knee  Old tear of medial meniscus of left knee, unspecified tear type    PROCEDURE PERFORMED  Platelet rich plasma (PRP) injection performed using ultrasound guidance for exact placement of orthobiologic at pathologic site(s)    POTENTIAL RISKS AND BENEFITS  We discussed that this is generally a well-tolerated and safe procedure. Even so, as with any invasive medical procedure, there may be associated rare risks. These risks were discussed in detail. The patient agreed to proceed with this procedure. Please see the electronic medical record for full written and signed patient consent documentation.    DESCRIPTION OF PROCEDURE  A) Procedural time out  A procedural time out was performed, including verification of patient ID, procedure to be performed, site and side/laterality, availability of patient information, review of safety issues, and the patients agreement and consent.     B) Phlebotomy and autograft platelet harvest, and PRP preparation  Sterile technique was used to phlebotomize 120mL of the patients blood from a peripheral vein. This blood was  into density-divided layers using an [employer] Bradley centrifuge. The layer of LEUKOCYTE poor hematocrit 2%) PRP, a volume drawn up to 5mL (with PPP, if needed: 3.5:PRP, 1.5 PPP) per site/joint, was placed into a sterile syringe in preparation for injection.     C) Platelet rich plasma delivery to pathologic site  Injection site and laterality: left KNEE  Using 1) physical examination and 2) musculoskeletal ultrasound, the anatomy was visualized and the diseased tissue was identified and confirmed. The procedure site was marked with a skin marker. The patient was placed in position maximizing 1) physician access to pathologic tissue and 2) patient comfort. The skin about  the area was sterilized with Betadyne. The site of needle insertion was anesthetized using vapocoolant. Under ultrasound guidance, the needle was advanced to the site of tissue pathology, and the injectate was deposited under direct visualization.    POST-PROCEDURE DIAGNOSIS  Chronic pain of left knee  Primary osteoarthritis of left knee  Old tear of medial meniscus of left knee, unspecified tear type    POST-PROCEDURE CARE  Following the procedure, a sterile bandage was placed over the injection site.    Complications: none     Estimated blood loss from injection procedure: none       DISPOSITION  The patient tolerated the procedure well and there were no immediate complications. The patient was instructed to call the clinic immediately for any mild to moderate adverse side effects, or to call 911 in the event of an emergency.        Description of ultrasound utilization for needle / probe guidance  Ultrasound guidance was used for needle / probe localization. Images (and videos, if appropriate) were saved and stored for documentation. Dynamic visualization of the needle / probe was continuous throughout the procedure.

## 2024-06-25 ENCOUNTER — CLINICAL SUPPORT (OUTPATIENT)
Dept: AUDIOLOGY | Facility: CLINIC | Age: 69
End: 2024-06-25
Payer: MEDICARE

## 2024-06-25 DIAGNOSIS — H90.3 SENSORINEURAL HEARING LOSS, BILATERAL: Primary | ICD-10-CM

## 2024-06-25 NOTE — PROGRESS NOTES
HEARING AID CONSULTATION    Giovanni Moncada was seen today for a hearing aid consultation. We reviewed his hearing test results he provided from an outside audiology clinic from 05/09/2024 and discussed different levels of technology of hearing aids. Mr. Moncada was informed of all pricing and service options available through [facility] ([employer]). He was also advised to verify what, if any, discounts or benefits are available with his insurance. Mr. Moncada understood that [employer] is not in network with any insurance payor. Mr. 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 He noted that he has season tickets to the [employer] [place] in the second row. He noted that he sweats often and wants something durable. We discussed the IP rating of 68 on most hearing aids. Mr. Basurto trialed both Widex smartRIC and Phonak Lumity in office and preferred the Widex sound significantly more than Phonak.     Mr. Moncada was informed of the non-refundable $250.00 deposit required to order and that the remaining balance is due the day of .     Widex smartRIC 440 hearing aids were selected in silver gray; he measured for  length 2 in both ears with medium vented sleeves. Mr. Basurto will be called to schedule upon receipt of hearing aids.

## 2024-06-28 ENCOUNTER — DOCUMENTATION ONLY (OUTPATIENT)
Dept: AUDIOLOGY | Facility: CLINIC | Age: 69
End: 2024-06-28
Payer: MEDICARE

## 2024-06-28 NOTE — PROGRESS NOTES
Hearing aids arrived from ; devices were assembled and charged in preparation for fitting. Patient was called to schedule fitting.     Hearing Aid Details      & Model: WideRingMD SmartRIC 440  Color: silver grey  Rt SN: [id number]  Lt SN: [id number]  Battery: LI Rechargeable  Rt  and Dome: 2M, medium vented sleeves  Lt  and Dome: 2M, medium vented sleeves  Repair Warranty Exp: 07/26/2027  L&D Warranty Exp: 07/26/2027   SN: [id number]

## 2024-07-02 NOTE — PROCEDURES
PRP injection 3/3    PRE-PROCEDURE DIAGNOSIS and   PATIENT INDICATIONS  Giovanni Moncada, a 68 y.o. male, presents today with:   Chronic pain of left knee  Primary osteoarthritis of left knee  Old tear of medial meniscus of left knee, unspecified tear type    PROCEDURE PERFORMED  Platelet rich plasma (PRP) injection performed using ultrasound guidance for exact placement of orthobiologic at pathologic site(s)    POTENTIAL RISKS AND BENEFITS  We discussed that this is generally a well-tolerated and safe procedure. Even so, as with any invasive medical procedure, there may be associated rare risks. These risks were discussed in detail. The patient agreed to proceed with this procedure. Please see the electronic medical record for full written and signed patient consent documentation.    DESCRIPTION OF PROCEDURE  A) Procedural time out  A procedural time out was performed, including verification of patient ID, procedure to be performed, site and side/laterality, availability of patient information, review of safety issues, and the patients agreement and consent.     B) Phlebotomy and autograft platelet harvest, and PRP preparation  Sterile technique was used to phlebotomize 120mL of the patients blood from a peripheral vein. This blood was  into density-divided layers using an [employer] Bradley centrifuge. The layer of LEUKOCYTE poor hematocrit 2%) PRP, a volume drawn up to 5mL (with PPP, if needed: 3.5:PRP, 1.5 PPP) per site/joint, was placed into a sterile syringe in preparation for injection.     C) Platelet rich plasma delivery to pathologic site  Injection site and laterality: left KNEE  Using 1) physical examination and 2) musculoskeletal ultrasound, the anatomy was visualized and the diseased tissue was identified and confirmed. The procedure site was marked with a skin marker. The patient was placed in position maximizing 1) physician access to pathologic tissue and 2) patient comfort. The skin about  the area was sterilized with Betadyne. The site of needle insertion was anesthetized using vapocoolant. Under ultrasound guidance, the needle was advanced to the site of tissue pathology, and the injectate was deposited under direct visualization.    POST-PROCEDURE DIAGNOSIS  Chronic pain of left knee  Primary osteoarthritis of left knee  Old tear of medial meniscus of left knee, unspecified tear type    POST-PROCEDURE CARE  Following the procedure, a sterile bandage was placed over the injection site.    Complications: none     Estimated blood loss from injection procedure: none       DISPOSITION  The patient tolerated the procedure well and there were no immediate complications. The patient was instructed to call the clinic immediately for any mild to moderate adverse side effects, or to call 911 in the event of an emergency.        Description of ultrasound utilization for needle / probe guidance  Ultrasound guidance was used for needle / probe localization. Images (and videos, if appropriate) were saved and stored for documentation. Dynamic visualization of the needle / probe was continuous throughout the procedure.

## 2024-07-03 ENCOUNTER — CLINICAL SUPPORT (OUTPATIENT)
Dept: AUDIOLOGY | Facility: CLINIC | Age: 69
End: 2024-07-03
Payer: MEDICARE

## 2024-07-03 DIAGNOSIS — H90.3 SENSORINEURAL HEARING LOSS, BILATERAL: Primary | ICD-10-CM

## 2024-07-03 NOTE — PROGRESS NOTES
HEARING AID FITTING    Giovanni Moncada was seen today for a hearing aid fitting.  All parts of the hearing aid, including battery, domes, wax filters, and microphones, were discussed with the patient.  Battery charging was also reviewed and practiced with the patient. Real ear and feedback measures were taken and hearing aid was fit to patient's satisfaction. Sensogram was completed within [employer] software. Counseled patient on daily wear and maintenance of the hearing aid.  Mr. Moncada acknowledged that he understood. The hearing aids were connected to the patient's phone. Bluetooth settings were tested successfully in office. The purchase agreement, 30-day trial period, and warranties were also reviewed with patient.  It is recommended Mr. Moncada return to clinic in 2 weeks or as needed.      Hearing Aid Details     & Model: Widex SmartRIC 440  Color: silver grey  Rt SN: [id number]  Lt SN: [id number]  Battery: LI Rechargeable  Rt  and Dome: 2M, medium vented sleeves  Lt  and Dome: 2M, medium vented sleeves  Repair Warranty Exp: 07/26/2027  L&D Warranty Exp: 07/26/2027   SN: [id number]

## 2024-07-10 ENCOUNTER — HOSPITAL ENCOUNTER (OUTPATIENT)
Dept: CARDIOLOGY | Facility: HOSPITAL | Age: 69
Discharge: HOME | End: 2024-07-10
Attending: INTERNAL MEDICINE
Payer: MEDICARE

## 2024-07-10 VITALS
BODY MASS INDEX: 27.06 KG/M2 | DIASTOLIC BLOOD PRESSURE: 60 MMHG | SYSTOLIC BLOOD PRESSURE: 105 MMHG | HEART RATE: 50 BPM | WEIGHT: 189 LBS | HEIGHT: 70 IN

## 2024-07-10 DIAGNOSIS — I49.3 PVC (PREMATURE VENTRICULAR CONTRACTION): ICD-10-CM

## 2024-07-10 LAB
ASCENDING AORTA: 3.09 CM
AV INDEX (PROSTH): 0.6
AV MEAN GRADIENT: 9 MMHG
AV PEAK GRADIENT: 15 MMHG
AV VALVE AREA BY VELOCITY RATIO: 2.33 CM²
AV VALVE AREA: 2.43 CM²
AV VELOCITY RATIO: 0.58
BSA FOR ECHO PROCEDURE: 2.06 M2
CV ECHO LV RWT: 0.32 CM
DOP CALC AO PEAK VEL: 1.91 M/S
DOP CALC AO VTI: 43.7 CM
DOP CALC LVOT AREA: 4 CM2
DOP CALC LVOT DIAMETER: 2.27 CM
DOP CALC LVOT PEAK VEL: 1.1 M/S
DOP CALC LVOT STROKE VOLUME: 106.1 CM3
DOP CALC MV VTI: 38.81 CM
DOP CALCLVOT PEAK VEL VTI: 26.23 CM
E WAVE DECELERATION TIME: 383.31 MSEC
E/A RATIO: 1.03
E/E' RATIO: 7.6 M/S
ECHO LV POSTERIOR WALL: 0.87 CM (ref 0.6–1.1)
FRACTIONAL SHORTENING: 37 % (ref 28–44)
HR MV ECHO: 50 BPM
INTERVENTRICULAR SEPTUM: 0.93 CM (ref 0.6–1.1)
LA MAJOR: 5 CM
LA MINOR: 4.92 CM
LA WIDTH: 3.28 CM
LEFT ATRIUM SIZE: 3.7 CM
LEFT ATRIUM VOLUME INDEX MOD: 15.9 ML/M2
LEFT ATRIUM VOLUME INDEX: 25.1 ML/M2
LEFT ATRIUM VOLUME MOD: 32.39 CM3
LEFT ATRIUM VOLUME: 51.16 CM3
LEFT INTERNAL DIMENSION IN SYSTOLE: 3.41 CM (ref 2.1–4)
LEFT VENTRICLE DIASTOLIC VOLUME INDEX: 70.38 ML/M2
LEFT VENTRICLE DIASTOLIC VOLUME: 143.58 ML
LEFT VENTRICLE MASS INDEX: 89 G/M2
LEFT VENTRICLE SYSTOLIC VOLUME INDEX: 23.4 ML/M2
LEFT VENTRICLE SYSTOLIC VOLUME: 47.65 ML
LEFT VENTRICULAR INTERNAL DIMENSION IN DIASTOLE: 5.44 CM (ref 3.5–6)
LEFT VENTRICULAR MASS: 182.4 G
LV LATERAL E/E' RATIO: 6.91 M/S
LV SEPTAL E/E' RATIO: 8.44 M/S
MV A" WAVE DURATION": 18.84 MSEC
MV MEAN GRADIENT: 1 MMHG
MV PEAK A VEL: 0.74 M/S
MV PEAK E VEL: 0.76 M/S
MV PEAK GRADIENT: 3 MMHG
MV STENOSIS PRESSURE HALF TIME: 111.16 MS
MV VALVE AREA BY CONTINUITY EQUATION: 2.73 CM2
MV VALVE AREA P 1/2 METHOD: 1.98 CM2
PISA TR MAX VEL: 2.82 M/S
PULM VEIN S/D RATIO: 1.59
PV PEAK D VEL: 0.27 M/S
PV PEAK S VEL: 0.43 M/S
RA MAJOR: 5.12 CM
RA PRESSURE ESTIMATED: 3 MMHG
RA WIDTH: 2.64 CM
RIGHT VENTRICLE DIASTOLIC BASEL DIMENSION: 3.2 CM
RV TB RVSP: 6 MMHG
SINUS: 3.15 CM
STJ: 2.96 CM
TDI LATERAL: 0.11 M/S
TDI SEPTAL: 0.09 M/S
TDI: 0.1 M/S
TR MAX PG: 32 MMHG
TRICUSPID ANNULAR PLANE SYSTOLIC EXCURSION: 2.54 CM
TV REST PULMONARY ARTERY PRESSURE: 35 MMHG
Z-SCORE OF LEFT VENTRICULAR DIMENSION IN END DIASTOLE: -1.11
Z-SCORE OF LEFT VENTRICULAR DIMENSION IN END SYSTOLE: -0.7

## 2024-07-10 PROCEDURE — 93306 TTE W/DOPPLER COMPLETE: CPT

## 2024-07-10 PROCEDURE — 93306 TTE W/DOPPLER COMPLETE: CPT | Mod: 26,,, | Performed by: INTERNAL MEDICINE

## 2024-07-17 ENCOUNTER — CLINICAL SUPPORT (OUTPATIENT)
Dept: AUDIOLOGY | Facility: CLINIC | Age: 69
End: 2024-07-17
Payer: MEDICARE

## 2024-07-17 DIAGNOSIS — H90.3 SENSORINEURAL HEARING LOSS, BILATERAL: Primary | ICD-10-CM

## 2024-07-17 PROCEDURE — 99499 UNLISTED E&M SERVICE: CPT | Mod: S$GLB,,, | Performed by: AUDIOLOGIST

## 2024-07-17 NOTE — PROGRESS NOTES
HEARING AID FOLLOW-UP    Giovanni Moncada was seen today for a hearing aid follow-up visit.     He reported that sudden soft sounds were too loud and that soft, distant speech was not clear enough. Mr. Basurto was counseled on the balancing aspect of those two groups of sounds. We discussed the gradual improvement that occurs over time as the brain can handle more sound.     Sound classes for urban and party were changed to err on the side of comfort without speech and more clarity with speech.     Soft gain was decreased at all frequencies; MPO was lowered a few clicks across all frequencies. Mr. Basurto noted that soft sounds were better upon making changes; he is scheduled to return in two weeks. He would like to possibly try another  to see if that sounds better of if he would like to stick with his current hearing aids.

## 2024-07-31 ENCOUNTER — CLINICAL SUPPORT (OUTPATIENT)
Dept: AUDIOLOGY | Facility: CLINIC | Age: 69
End: 2024-07-31
Payer: MEDICARE

## 2024-07-31 DIAGNOSIS — H90.3 SENSORINEURAL HEARING LOSS, BILATERAL: Primary | ICD-10-CM

## 2024-07-31 PROCEDURE — 99499 UNLISTED E&M SERVICE: CPT | Mod: S$GLB,,, | Performed by: AUDIOLOGIST

## 2024-07-31 NOTE — PROGRESS NOTES
HEARING AID FOLLOW-UP    Giovanni Moncada was seen today for a hearing aid follow-up visit. Mr. Basurto was accompanied by his son, Micheal, today. He noted that all soft sounds are too much and he is not able to tolerate the sound. Several adjustments were made to err on the side of comfort in his Widex SmartRIC's, but he is having the same issues.    Mr. Basurto is trialing Phonak KYTOSAN USAeo L90-R hearing aids with 2M receivers and a medium vented dome in the right ear and a medium power dome in the left ear. We also discussed custom molds as a possible fit solution. The hearing aids were connected to Mr. Basurto's phone; he was shown how to access and use the josé luis.     Mr. Basurto's trial ends on Monday (trial ends on a Saturday). He will let me know at his next scheduled visit (August 13th) if he would like to change manufacturers.

## 2024-08-13 ENCOUNTER — CLINICAL SUPPORT (OUTPATIENT)
Dept: AUDIOLOGY | Facility: CLINIC | Age: 69
End: 2024-08-13
Payer: MEDICARE

## 2024-08-13 DIAGNOSIS — H90.3 SENSORINEURAL HEARING LOSS, BILATERAL: Primary | ICD-10-CM

## 2024-08-13 PROCEDURE — 99499 UNLISTED E&M SERVICE: CPT | Mod: S$GLB,,, | Performed by: AUDIOLOGIST

## 2024-08-13 NOTE — PROGRESS NOTES
HEARING AID FOLLOW-UP    Giovanni Moncada was seen today for a hearing aid follow-up visit.     Mr. Basurto noted that he liked the Phonak hearing aids significantly more but would like custom molds to help with retention issues. Clipboard hearing aids will be returned for credit.     He selected the color silver gray and measured for  length 2. Slim tips were ordered with hearing aids and packaged to be sent to the . Mr. Moncada will continue using the loaner Lumity hearing aids until the new hearing aids arrive.     We will call him to schedule upon receipt of the hearing aids and molds. He agreed to the cost of the custom molds.

## 2024-08-26 ENCOUNTER — DOCUMENTATION ONLY (OUTPATIENT)
Dept: AUDIOLOGY | Facility: CLINIC | Age: 69
End: 2024-08-26
Payer: MEDICARE

## 2024-08-26 NOTE — PROGRESS NOTES
Hearing aids arrived from ; devices were assembled and charged in preparation for fitting. Patient was called to schedule fitting.     Hearing Aid Details      & Model: Phonak Audeo [id number]  Color: silver gray  Rt SN: [id number]  Lt SN: [id number]  Battery: LI Rechargeable  Rt  and Dome: M2; Slim tip acryl; 2433AACV ACC: 109 314  Lt  and Dome: M2; slim tip acryl; 2433AACU ACC: 109 949  Slim tip warranty exp: 12/17/2024  Repair Warranty Exp: 9/18/2027  L&D Warranty Exp: 9/18/2027   SN: [id number]

## 2024-09-12 ENCOUNTER — PATIENT MESSAGE (OUTPATIENT)
Dept: AUDIOLOGY | Facility: CLINIC | Age: 69
End: 2024-09-12
Payer: MEDICARE

## 2024-09-13 ENCOUNTER — TELEPHONE (OUTPATIENT)
Dept: AUDIOLOGY | Facility: CLINIC | Age: 69
End: 2024-09-13
Payer: MEDICARE

## 2024-09-25 DIAGNOSIS — Z00.00 ENCOUNTER FOR MEDICARE ANNUAL WELLNESS EXAM: ICD-10-CM

## 2024-09-26 ENCOUNTER — CLINICAL SUPPORT (OUTPATIENT)
Dept: AUDIOLOGY | Facility: CLINIC | Age: 69
End: 2024-09-26
Payer: MEDICARE

## 2024-09-26 DIAGNOSIS — H90.3 SENSORINEURAL HEARING LOSS, BILATERAL: Primary | ICD-10-CM

## 2024-09-26 NOTE — PROGRESS NOTES
HEARING AID FOLLOW-UP    Giovanni Moncada was seen today for a hearing aid follow-up to be fit with new hearing aids.  All parts of the hearing aid, including battery, domes, wax filters, and microphones, were discussed with the patient.  Battery charging was also reviewed and practiced with the patient.  Feedback measures were taken and hearing aid was fit to patient's satisfaction.  Counseled patient on daily wear and maintenance of the hearing aid.  Mr. Moncada acknowledged that he understood.  The hearing aids were connected to the patient's phone.  Bluetooth settings were tested successfully in office. The purchase agreement, 30-day trial period, and warranties were also reviewed with patient.  It is recommended Mr. Moncada return to clinic in 2 weeks or as needed.      Hearing Aid Details    Itemized: 12/26/2024  Trial Ends: 10/28/2024   & Model: Phonak Audeo [id number]  Color: silver gray  Rt SN: [id number]  Lt SN: [id number]  Battery: LI Rechargeable  Rt  and Dome: M2; Slim tip acryl; 2433AACV ACC: 109 314  Lt  and Dome: M2; slim tip acryl; 2433AACU ACC: 109 949  Slim tip warranty exp: 12/17/2024  Repair Warranty Exp: 9/26/2027  L&D Warranty Exp: 9/26/2027   SN: [id number]

## 2024-10-14 ENCOUNTER — PATIENT MESSAGE (OUTPATIENT)
Dept: PRIMARY CARE CLINIC | Facility: CLINIC | Age: 69
End: 2024-10-14
Payer: MEDICARE

## 2024-10-17 ENCOUNTER — LAB VISIT (OUTPATIENT)
Dept: LAB | Facility: HOSPITAL | Age: 69
End: 2024-10-17
Attending: INTERNAL MEDICINE
Payer: MEDICARE

## 2024-10-17 DIAGNOSIS — E78.2 HYPERLIPIDEMIA, MIXED: ICD-10-CM

## 2024-10-17 LAB
ALBUMIN SERPL BCP-MCNC: 4.3 G/DL (ref 3.5–5.2)
ALP SERPL-CCNC: 85 U/L (ref 40–150)
ALT SERPL W/O P-5'-P-CCNC: 18 U/L (ref 10–44)
ANION GAP SERPL CALC-SCNC: 8 MMOL/L (ref 8–16)
AST SERPL-CCNC: 18 U/L (ref 10–40)
BILIRUB SERPL-MCNC: 0.7 MG/DL (ref 0.1–1)
BUN SERPL-MCNC: 17 MG/DL (ref 8–23)
CALCIUM SERPL-MCNC: 10.1 MG/DL (ref 8.7–10.5)
CHLORIDE SERPL-SCNC: 107 MMOL/L (ref 95–110)
CHOLEST SERPL-MCNC: 146 MG/DL (ref 120–199)
CHOLEST/HDLC SERPL: 2.7 {RATIO} (ref 2–5)
CO2 SERPL-SCNC: 24 MMOL/L (ref 23–29)
CREAT SERPL-MCNC: 1 MG/DL (ref 0.5–1.4)
EST. GFR  (NO RACE VARIABLE): >60 ML/MIN/1.73 M^2
GLUCOSE SERPL-MCNC: 107 MG/DL (ref 70–110)
HDLC SERPL-MCNC: 54 MG/DL (ref 40–75)
HDLC SERPL: 37 % (ref 20–50)
LDLC SERPL CALC-MCNC: 72.8 MG/DL (ref 63–159)
NONHDLC SERPL-MCNC: 92 MG/DL
POTASSIUM SERPL-SCNC: 4.6 MMOL/L (ref 3.5–5.1)
PROT SERPL-MCNC: 7.2 G/DL (ref 6–8.4)
SODIUM SERPL-SCNC: 139 MMOL/L (ref 136–145)
TRIGL SERPL-MCNC: 96 MG/DL (ref 30–150)

## 2024-10-17 PROCEDURE — 80053 COMPREHEN METABOLIC PANEL: CPT | Performed by: INTERNAL MEDICINE

## 2024-10-17 PROCEDURE — 80061 LIPID PANEL: CPT | Performed by: INTERNAL MEDICINE

## 2024-10-17 PROCEDURE — 36415 COLL VENOUS BLD VENIPUNCTURE: CPT | Performed by: INTERNAL MEDICINE

## 2024-10-25 ENCOUNTER — OFFICE VISIT (OUTPATIENT)
Dept: PRIMARY CARE CLINIC | Facility: CLINIC | Age: 69
End: 2024-10-25
Payer: MEDICARE

## 2024-10-25 VITALS
HEART RATE: 56 BPM | OXYGEN SATURATION: 98 % | HEIGHT: 70 IN | BODY MASS INDEX: 27.15 KG/M2 | WEIGHT: 189.63 LBS | SYSTOLIC BLOOD PRESSURE: 110 MMHG | DIASTOLIC BLOOD PRESSURE: 68 MMHG

## 2024-10-25 DIAGNOSIS — E78.2 HYPERLIPIDEMIA, MIXED: ICD-10-CM

## 2024-10-25 DIAGNOSIS — Z85.89 HISTORY OF SQUAMOUS CELL CARCINOMA: ICD-10-CM

## 2024-10-25 DIAGNOSIS — Z12.5 SCREENING FOR MALIGNANT NEOPLASM OF PROSTATE: ICD-10-CM

## 2024-10-25 DIAGNOSIS — N52.9 ERECTILE DYSFUNCTION, UNSPECIFIED ERECTILE DYSFUNCTION TYPE: ICD-10-CM

## 2024-10-25 DIAGNOSIS — K21.9 GERD WITHOUT ESOPHAGITIS: ICD-10-CM

## 2024-10-25 DIAGNOSIS — Z11.3 SCREEN FOR STD (SEXUALLY TRANSMITTED DISEASE): ICD-10-CM

## 2024-10-25 DIAGNOSIS — I10 ESSENTIAL HYPERTENSION: Primary | ICD-10-CM

## 2024-10-25 PROCEDURE — 3074F SYST BP LT 130 MM HG: CPT | Mod: CPTII,S$GLB,, | Performed by: INTERNAL MEDICINE

## 2024-10-25 PROCEDURE — 3078F DIAST BP <80 MM HG: CPT | Mod: CPTII,S$GLB,, | Performed by: INTERNAL MEDICINE

## 2024-10-25 PROCEDURE — 4010F ACE/ARB THERAPY RXD/TAKEN: CPT | Mod: CPTII,S$GLB,, | Performed by: INTERNAL MEDICINE

## 2024-10-25 PROCEDURE — 1159F MED LIST DOCD IN RCRD: CPT | Mod: CPTII,S$GLB,, | Performed by: INTERNAL MEDICINE

## 2024-10-25 PROCEDURE — 99214 OFFICE O/P EST MOD 30 MIN: CPT | Mod: S$GLB,,, | Performed by: INTERNAL MEDICINE

## 2024-10-25 PROCEDURE — 1126F AMNT PAIN NOTED NONE PRSNT: CPT | Mod: CPTII,S$GLB,, | Performed by: INTERNAL MEDICINE

## 2024-10-25 PROCEDURE — 99999 PR PBB SHADOW E&M-EST. PATIENT-LVL III: CPT | Mod: PBBFAC,,, | Performed by: INTERNAL MEDICINE

## 2024-10-25 PROCEDURE — 3008F BODY MASS INDEX DOCD: CPT | Mod: CPTII,S$GLB,, | Performed by: INTERNAL MEDICINE

## 2024-11-06 ENCOUNTER — OFFICE VISIT (OUTPATIENT)
Dept: INTERNAL MEDICINE | Facility: CLINIC | Age: 69
End: 2024-11-06
Payer: MEDICARE

## 2024-11-06 VITALS
SYSTOLIC BLOOD PRESSURE: 106 MMHG | HEIGHT: 70 IN | RESPIRATION RATE: 20 BRPM | DIASTOLIC BLOOD PRESSURE: 59 MMHG | OXYGEN SATURATION: 96 % | BODY MASS INDEX: 27.21 KG/M2 | WEIGHT: 190.06 LBS | HEART RATE: 51 BPM

## 2024-11-06 DIAGNOSIS — K21.9 GERD WITHOUT ESOPHAGITIS: ICD-10-CM

## 2024-11-06 DIAGNOSIS — M54.2 CHRONIC NECK PAIN: ICD-10-CM

## 2024-11-06 DIAGNOSIS — Z00.00 ENCOUNTER FOR PREVENTIVE HEALTH EXAMINATION: Primary | ICD-10-CM

## 2024-11-06 DIAGNOSIS — I10 ESSENTIAL HYPERTENSION: ICD-10-CM

## 2024-11-06 DIAGNOSIS — I49.3 PVC (PREMATURE VENTRICULAR CONTRACTION): ICD-10-CM

## 2024-11-06 DIAGNOSIS — E78.2 HYPERLIPIDEMIA, MIXED: ICD-10-CM

## 2024-11-06 DIAGNOSIS — G89.29 CHRONIC NECK PAIN: ICD-10-CM

## 2024-11-06 PROCEDURE — 99999 PR PBB SHADOW E&M-EST. PATIENT-LVL III: CPT | Mod: PBBFAC,,,

## 2024-11-06 NOTE — PATIENT INSTRUCTIONS
Counseling and Referral of Other Preventative  (Italic type indicates deductible and co-insurance are waived)    Patient Name: Giovanni Moncada  Today's Date: 11/6/2024    Health Maintenance         Date Due Completion Date    Hemoglobin A1c (Diabetic Prevention Screening) Never done ---    RSV Vaccine (Age 60+ and Pregnant patients) (1 - Risk 60-74 years 1-dose series) 11/06/2024 (Originally 8/25/2014) ---    Influenza Vaccine (1) 06/30/2025 (Originally 9/1/2024) ---    TETANUS VACCINE 11/06/2025 (Originally 8/25/1972) ---    Shingles Vaccine (1 of 2) 11/06/2025 (Originally 8/25/1973) ---    COVID-19 Vaccine (3 - Pfizer risk series) 11/06/2025 (Originally 9/9/2021) 8/12/2021    Pneumococcal Vaccines (Age 65+) (1 of 2 - PCV) 11/06/2025 (Originally 8/25/1960) ---    Lipid Panel 10/17/2029 10/17/2024    Colorectal Cancer Screening 01/26/2031 1/26/2021          No orders of the defined types were placed in this encounter.      The following information is provided to all patients.  This information is to help you find resources for any of the problems found today that may be affecting your health:                  Living healthy guide: www.[facility].[place].gov      Understanding Diabetes: www.diabetes.org      Eating healthy: www.cdc.gov/healthyweight      CDC home safety checklist: www.cdc.gov/steadi/patient.html      Agency on Aging: www.goea.[place].gov      Alcoholics anonymous (AA): www.aa.org      Physical Activity: www.debby.nih.gov/rt5gwho      Tobacco use: www.quitwithusla.org

## 2024-11-06 NOTE — PROGRESS NOTES
"  Giovanni Moncada presented for an initial Medicare AWV today.  He is a patient of Dr. Flaherty and is new to me.  The following components were reviewed and updated:    Medical history  Family History  Social history  Allergies and Current Medications  Health Risk Assessment  Health Maintenance  Care Team    **See Completed Assessments for Annual Wellness visit with in the encounter summary    The following assessments were completed:  Depression Screening  Cognitive function Screening    Timed Get Up Test  Whisper Test      Opioid documentation:      Patient does not have a current opioid prescription.          Vitals:    11/06/24 0941   BP: (!) 106/59   BP Location: Left arm   Patient Position: Sitting   Pulse: (!) 51   Resp: 20   SpO2: 96%   Weight: 86.2 kg (190 lb 0.6 oz)   Height: 5' 10" (1.778 m)     Body mass index is 27.27 kg/m².       Physical Exam  Vitals reviewed.   Constitutional:       Appearance: Normal appearance.   HENT:      Head: Normocephalic and atraumatic.   Cardiovascular:      Rate and Rhythm: Normal rate and regular rhythm.      Pulses: Normal pulses.           Radial pulses are 2+ on the right side and 2+ on the left side.        Dorsalis pedis pulses are 2+ on the right side and 2+ on the left side.        Posterior tibial pulses are 2+ on the right side and 2+ on the left side.      Heart sounds: Normal heart sounds.   Pulmonary:      Effort: Pulmonary effort is normal.      Breath sounds: Normal breath sounds.   Musculoskeletal:      Right lower leg: No edema.      Left lower leg: No edema.   Skin:     General: Skin is warm and dry.      Capillary Refill: Capillary refill takes less than 2 seconds.   Neurological:      General: No focal deficit present.      Mental Status: He is alert and oriented to person, place, and time.   Psychiatric:         Mood and Affect: Mood normal.         Behavior: Behavior normal.       Diagnoses and health risks identified today and associated " recommendations/orders:  1. Encounter for preventive health examination  Assessment and evaluation performed as stated above.    2. Essential hypertension  Stable on acebutolol, olmesartan.  Encouraged patient to restrict sodium intake.  Follow-up with PCP.    3. Hyperlipidemia, mixed  Stable on atorvastatin.  Encouraged patient to adhere to a low-fat low-cholesterol diet.  Follow-up with PCP.    4. PVC (premature ventricular contraction)  Stable on acebutolol.  Follow-up with PCP.    5. GERD without esophagitis  Stable on omeprazole.  Follow-up with PCP.    6. Chronic neck pain    Stable on current treatment regimen.  Follow-up with PCP.      Provided Giovanni with a 5-10 year written screening schedule and personal prevention plan. Recommendations were developed using the USPSTF age appropriate recommendations. Education, counseling, and referrals were provided as needed.  After Visit Summary printed and given to patient which includes a list of additional screenings\tests needed.    Follow up in about 1 year (around 11/6/2025).      Evelyne Collado NP    I offered to discuss advanced care planning, including how to pick a person who would make decisions for you if you were unable to make them for yourself, called a health care power of , and what kind of decisions you might make such as use of life sustaining treatments such as ventilators and tube feeding when faced with a life limiting illness recorded on a living will that they will need to know. (How you want to be cared for as you near the end of your natural life)     X  Patient has advanced directives written and agrees to provide copies to the institution.

## 2024-11-07 ENCOUNTER — OFFICE VISIT (OUTPATIENT)
Dept: CARDIOLOGY | Facility: CLINIC | Age: 69
End: 2024-11-07
Payer: MEDICARE

## 2024-11-07 VITALS
HEART RATE: 50 BPM | WEIGHT: 187.81 LBS | SYSTOLIC BLOOD PRESSURE: 140 MMHG | DIASTOLIC BLOOD PRESSURE: 79 MMHG | BODY MASS INDEX: 26.95 KG/M2

## 2024-11-07 DIAGNOSIS — E78.2 HYPERLIPIDEMIA, MIXED: ICD-10-CM

## 2024-11-07 DIAGNOSIS — I10 ESSENTIAL HYPERTENSION: ICD-10-CM

## 2024-11-07 DIAGNOSIS — I49.3 PVC (PREMATURE VENTRICULAR CONTRACTION): Primary | ICD-10-CM

## 2024-11-07 PROCEDURE — 1126F AMNT PAIN NOTED NONE PRSNT: CPT | Mod: CPTII,S$GLB,, | Performed by: INTERNAL MEDICINE

## 2024-11-07 PROCEDURE — 1160F RVW MEDS BY RX/DR IN RCRD: CPT | Mod: CPTII,S$GLB,, | Performed by: INTERNAL MEDICINE

## 2024-11-07 PROCEDURE — 1159F MED LIST DOCD IN RCRD: CPT | Mod: CPTII,S$GLB,, | Performed by: INTERNAL MEDICINE

## 2024-11-07 PROCEDURE — 99999 PR PBB SHADOW E&M-EST. PATIENT-LVL III: CPT | Mod: PBBFAC,,, | Performed by: INTERNAL MEDICINE

## 2024-11-07 PROCEDURE — 3077F SYST BP >= 140 MM HG: CPT | Mod: CPTII,S$GLB,, | Performed by: INTERNAL MEDICINE

## 2024-11-07 PROCEDURE — 3008F BODY MASS INDEX DOCD: CPT | Mod: CPTII,S$GLB,, | Performed by: INTERNAL MEDICINE

## 2024-11-07 PROCEDURE — 3078F DIAST BP <80 MM HG: CPT | Mod: CPTII,S$GLB,, | Performed by: INTERNAL MEDICINE

## 2024-11-07 PROCEDURE — 1101F PT FALLS ASSESS-DOCD LE1/YR: CPT | Mod: CPTII,S$GLB,, | Performed by: INTERNAL MEDICINE

## 2024-11-07 PROCEDURE — 3288F FALL RISK ASSESSMENT DOCD: CPT | Mod: CPTII,S$GLB,, | Performed by: INTERNAL MEDICINE

## 2024-11-07 PROCEDURE — 4010F ACE/ARB THERAPY RXD/TAKEN: CPT | Mod: CPTII,S$GLB,, | Performed by: INTERNAL MEDICINE

## 2024-11-07 PROCEDURE — 99214 OFFICE O/P EST MOD 30 MIN: CPT | Mod: S$GLB,,, | Performed by: INTERNAL MEDICINE

## 2024-11-07 NOTE — PROGRESS NOTES
HISTORY:    69-year-old male with a history of hypertension, hyperlipidemia, PVCs, SCC of the neck status post surgery/radiation '00s, DDD presenting for 2nd visit with me.    The patient denies any symptoms of chest pain, shortness of breath, or dyspnea on exertion. Occasional palpitations, chronic for years. Non-limiting.     Activity levels are excellent.  The patient walks 12,000-19,000 steps per day.  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 Eats healthy, avoids processed foods.     The patient denies any previous history of myocardial infarction, coronary artery disease, peripheral arterial disease, stroke, congestive heart failure, or cardiomyopathy.    Tolerates acebutolol 200x2, amlodipine 5 x 1, olmesartan 20x1, atorvastatin 20 x 1. Also on doxazosin. Takes a variety of vitamin supplements.     PHYSICAL EXAM:    Vitals:    11/07/24 1525   BP: (!) 140/79   Pulse: (!) 50       NAD, A+Ox3.  No jvd, no bruit.  Regular w some ectopy nml s1,s2. No murmurs.  CTA B no wheezes or crackles.  No edema.    LABS/STUDIES (imaging reviewed during clinic visit):    April 2024 CBC and CMP normal.  /HDL 53/LDL 70/.  ECG May 2024 sinus rhythm with no Q-waves or ST changes.  February 2023 sinus rhythm with PVCs.    Holter May 2024 sinus rhythm with average heart rate of 61 beats per minute.  22% PVC burden.  No symptoms reported.  TTE normal LV size and function with EF of 55-60%.  Normal diastology.  Coronary calcium score February 2023 Agatston score of 0.  Carotid 2023 bilateral carotid atherosclerosis with no evidence of significant ICA disease bilaterally.    ASSESSMENT & PLAN:    1. PVC (premature ventricular contraction)    2. Hyperlipidemia, mixed    3. Essential hypertension          Orders Placed This Encounter    Ambulatory referral/consult to Cardiac Electrophysiology          Asymptomatic PVCs on acebutolol 200x2. Normal LVEF. 22% PVC burden, higher burden between  6am and 11am. Trial of acebutolol 200x2. Avg HR 61 on holter. Tentative to increase dose due to ED. Will have pt meet with EPS to discuss high burden.     Hypertension controlled on acebutolol 200x2, amlodipine 5x1 and olmesartan 20x1.    Hyperlipidemia on atorvastatin 20x1.     Follow up in about 6 months (around 5/7/2025).        Carmelina Juan MD

## 2024-11-11 ENCOUNTER — TELEPHONE (OUTPATIENT)
Dept: ELECTROPHYSIOLOGY | Facility: CLINIC | Age: 69
End: 2024-11-11
Payer: MEDICARE

## 2024-11-12 ENCOUNTER — OFFICE VISIT (OUTPATIENT)
Dept: ELECTROPHYSIOLOGY | Facility: CLINIC | Age: 69
End: 2024-11-12
Payer: MEDICARE

## 2024-11-12 VITALS
WEIGHT: 192.88 LBS | SYSTOLIC BLOOD PRESSURE: 142 MMHG | HEART RATE: 68 BPM | DIASTOLIC BLOOD PRESSURE: 80 MMHG | BODY MASS INDEX: 27.68 KG/M2

## 2024-11-12 DIAGNOSIS — I10 ESSENTIAL HYPERTENSION: ICD-10-CM

## 2024-11-12 DIAGNOSIS — I49.3 PVC (PREMATURE VENTRICULAR CONTRACTION): Primary | ICD-10-CM

## 2024-11-12 PROCEDURE — 1101F PT FALLS ASSESS-DOCD LE1/YR: CPT | Mod: CPTII,S$GLB,, | Performed by: INTERNAL MEDICINE

## 2024-11-12 PROCEDURE — 1159F MED LIST DOCD IN RCRD: CPT | Mod: CPTII,S$GLB,, | Performed by: INTERNAL MEDICINE

## 2024-11-12 PROCEDURE — 99204 OFFICE O/P NEW MOD 45 MIN: CPT | Mod: S$GLB,,, | Performed by: INTERNAL MEDICINE

## 2024-11-12 PROCEDURE — 3008F BODY MASS INDEX DOCD: CPT | Mod: CPTII,S$GLB,, | Performed by: INTERNAL MEDICINE

## 2024-11-12 PROCEDURE — 4010F ACE/ARB THERAPY RXD/TAKEN: CPT | Mod: CPTII,S$GLB,, | Performed by: INTERNAL MEDICINE

## 2024-11-12 PROCEDURE — 3288F FALL RISK ASSESSMENT DOCD: CPT | Mod: CPTII,S$GLB,, | Performed by: INTERNAL MEDICINE

## 2024-11-12 PROCEDURE — 99999 PR PBB SHADOW E&M-EST. PATIENT-LVL V: CPT | Mod: PBBFAC,,, | Performed by: INTERNAL MEDICINE

## 2024-11-12 PROCEDURE — 93005 ELECTROCARDIOGRAM TRACING: CPT | Mod: S$GLB,,, | Performed by: INTERNAL MEDICINE

## 2024-11-12 PROCEDURE — 3079F DIAST BP 80-89 MM HG: CPT | Mod: CPTII,S$GLB,, | Performed by: INTERNAL MEDICINE

## 2024-11-12 PROCEDURE — 1126F AMNT PAIN NOTED NONE PRSNT: CPT | Mod: CPTII,S$GLB,, | Performed by: INTERNAL MEDICINE

## 2024-11-12 PROCEDURE — 93010 ELECTROCARDIOGRAM REPORT: CPT | Mod: S$GLB,,, | Performed by: INTERNAL MEDICINE

## 2024-11-12 PROCEDURE — 1160F RVW MEDS BY RX/DR IN RCRD: CPT | Mod: CPTII,S$GLB,, | Performed by: INTERNAL MEDICINE

## 2024-11-12 PROCEDURE — 3077F SYST BP >= 140 MM HG: CPT | Mod: CPTII,S$GLB,, | Performed by: INTERNAL MEDICINE

## 2024-11-12 RX ORDER — VERAPAMIL HYDROCHLORIDE 180 MG/1
180 TABLET, EXTENDED RELEASE ORAL NIGHTLY
Qty: 90 TABLET | Refills: 3 | Status: SHIPPED | OUTPATIENT
Start: 2024-11-12 | End: 2025-11-12

## 2024-11-12 NOTE — PROGRESS NOTES
PCP - Arnel Flaherty MD  Subjective:     Giovanni Moncada is a 69 y.o. male with PMH of hypertension, hyperlipidemia, h/o PVCs, h/o head and neck squamous cell carcinoma s/p remote surgery/radiation who presents to arrhythmia clinic to establish care. He was referred by Dr. Juan for history of PVCs. Noted to have asymptomatic PVCs for which a Holter was ordered which was significant for 22% PVC burden 5/2024.     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. He walks on average ~13k steps per and states he ascends 4+ flights of stairs without difficulty. He denies any functional limitations or exertional symptoms.           ---------------  Cardiac Studies ---------------  Holter 5/2024:   24 hour Holter monitoring demonstrates sinus rhythm with an average heart rate of 61 beats per minute.    PVC burden of 22.8%.    PAC burden of 0.6%.  Occasional accelerated junctional rhythm.  Rare ventricular escape beats/idioventricular rhythm during sleeping hours.  No symptoms reported.      TTE 7/2024:     Left Ventricle: The left ventricle is normal in size. Normal wall thickness. There is normal systolic function with a visually estimated ejection fraction of 55 - 60%. There is normal diastolic function.     Normal echocardiogram with Doppler  Frequent extrasystoles noted.    History:     Social History     Tobacco Use    Smoking status: Never    Smokeless tobacco: Never   Substance Use Topics    Alcohol use: Yes     Alcohol/week: 8.0 standard drinks of alcohol     Types: 4 Glasses of wine, 4 Drinks containing 0.5 oz of alcohol per week     Family History   Problem Relation Name Age of Onset    Cancer Mother Darleen         Lung cancer    Heart disease Father Silviano     Colon cancer Neg Hx      Esophageal cancer Neg Hx         Meds:     Review of patient's allergies indicates:   Allergen Reactions    Lamisil [terbinafine] Hives       Current Outpatient Medications:      a-cysteine/arg zinc/glut/mv-mn (L GLUTAMINE-N ACET-ARG-VIT-MIN ORAL), Take by mouth., Disp: , Rfl:     ascorbic acid, vitamin C, (VITAMIN C) 1000 MG tablet, Take 1,000 mg by mouth., Disp: , Rfl:     aspirin (ECOTRIN) 81 MG EC tablet, Take 81 mg by mouth once daily. (Patient not taking: Reported on 11/12/2024), Disp: , Rfl:     atorvastatin (LIPITOR) 20 MG tablet, Take 1 tablet (20 mg total) by mouth nightly., Disp: 90 tablet, Rfl: 3    bacillus coagulans-inulin 1 billion-250 cell-mg Cap, Take by mouth., Disp: , Rfl:     cholecalciferol, vitamin D3, 5,000 unit Tab, Take by mouth., Disp: , Rfl:     ciclopirox (LOPROX) 0.77 % Crea, Apply topically 2 (two) times daily. Use for 2-4 weeks to rash on buttocks and to rash on feet when needed until resolved (Patient not taking: Reported on 11/6/2024), Disp: 90 g, Rfl: 1    clobetasoL (TEMOVATE) 0.05 % external solution, Apply to affected area on scalp daily for 2 weeks, then three times weekly (Patient not taking: Reported on 11/6/2024), Disp: 50 mL, Rfl: 5    coenzyme Q10 100 mg capsule, Take 100 mg by mouth once daily., Disp: , Rfl:     cyanocobalamin (VITAMIN B-12) 1000 MCG tablet, Take 100 mcg by mouth once daily., Disp: , Rfl:     doxazosin (CARDURA) 1 MG tablet, Take 1 tablet (1 mg total) by mouth nightly., Disp: 90 tablet, Rfl: 3    fluticasone propionate (FLONASE) 50 mcg/actuation nasal spray, 1 spray (50 mcg total) by Each Nostril route once daily. (Patient not taking: Reported on 11/6/2024), Disp: 48 g, Rfl: 3    glucosamine-chondroit-vit C-Mn 500-400 mg capsule, Take by mouth., Disp: , Rfl:     GLUCOSAMINE/CHONDR MARI A SOD (OSTEO BI-FLEX ORAL), Take 1,500 mg by mouth., Disp: , Rfl:     ketoconazole (NIZORAL) 2 % shampoo, Apply topically 3 (three) times a week. (Patient not taking: Reported on 11/6/2024), Disp: 120 mL, Rfl: 5    magnesium oxide (MAG-OX) 400 mg (241.3 mg magnesium) tablet, Take 1 twice a day for 1 month, then 1 daily; if diarrhea occurs, decrease  dose, Disp: , Rfl: 0    olmesartan (BENICAR) 20 MG tablet, TAKE 1 TABLET BY MOUTH EVERY EVENING, Disp: 90 tablet, Rfl: 1    omega-3 acid ethyl esters (LOVAZA) 1 gram capsule, Take 2 capsules (2 g total) by mouth 2 (two) times daily., Disp: 360 capsule, Rfl: 3    omeprazole (PRILOSEC) 20 MG capsule, TAKE 1 CAPSULE BY MOUTH DAILY, Disp: 90 capsule, Rfl: 3    sildenafiL (VIAGRA) 100 MG tablet, Take 1 tablet (100 mg total) by mouth daily as needed for Erectile Dysfunction. (Patient not taking: Reported on 11/6/2024), Disp: 90 tablet, Rfl: 3    tadalafiL (CIALIS) 20 MG Tab, Take 1 tablet (20 mg total) by mouth daily as needed., Disp: 90 tablet, Rfl: 3    UNABLE TO FIND, Chew-8-1 CBD:THC (wellness chews) 1 chew once to twice a day (Patient not taking: Reported on 11/6/2024), Disp: , Rfl:     UNABLE TO FIND, 1:1 cbd:thc tincture 25ml to 1ml under tongue 3xday as needed, Disp: , Rfl:     vardenafiL (LEVITRA) 20 MG tablet, , Disp: , Rfl:     verapamiL (CALAN-SR) 180 MG CR tablet, Take 1 tablet (180 mg total) by mouth every evening., Disp: 90 tablet, Rfl: 3    vitamin A acetate 10,000 unit Subl, Place 2,400 mcg under the tongue., Disp: , Rfl:     zinc acetate 50 mg (zinc) Cap, Take 50 mg by mouth., Disp: , Rfl:     10 point ROS performed and negative except as stated in HPI     Objective:   BP (!) 142/80 (BP Location: Left arm, Patient Position: Sitting)   Pulse 68   Wt 87.5 kg (192 lb 14.4 oz)   BMI 27.68 kg/m²     Physical Exam:   Constitutional: no acute distress  HEENT: NCAT, EOMI, no scleral icterus  Cardiovascular: Regular rate and rhythm with frequent extrasystoles. 2+ carotid, radial, DP pulses bilaterally    Pulmonary: Clear to auscultation bilaterally   Abdomen: nontender, non-distended   Neuro: alert and oriented, no focal deficits  Extremities: warm, no edema   MSK: no deformities  Integument: intact, no rashes       Labs:     Lab Results   Component Value Date     10/17/2024    K 4.6 10/17/2024      "10/17/2024    CO2 24 10/17/2024    BUN 17 10/17/2024    CREATININE 1.0 10/17/2024    ANIONGAP 8 10/17/2024     No results found for: "HGBA1C"  No results found for: "BNP", "BNPTRIAGEBLO"    Lab Results   Component Value Date    WBC 5.48 04/16/2024    HGB 14.7 04/16/2024    HCT 45.2 04/16/2024     04/16/2024    GRAN 3.0 04/16/2024    GRAN 53.9 04/16/2024     Lab Results   Component Value Date    CHOL 146 10/17/2024    HDL 54 10/17/2024    HDL 50 11/11/2019    LDLCALC 72.8 10/17/2024    TRIG 96 10/17/2024       Lab Results   Component Value Date     10/17/2024    K 4.6 10/17/2024     10/17/2024    CO2 24 10/17/2024    BUN 17 10/17/2024    CREATININE 1.0 10/17/2024    ANIONGAP 8 10/17/2024     No results found for: "HGBA1C"  No results found for: "BNP", "BNPTRIAGEBLO" Lab Results   Component Value Date    WBC 5.48 04/16/2024    HGB 14.7 04/16/2024    HCT 45.2 04/16/2024     04/16/2024    GRAN 3.0 04/16/2024    GRAN 53.9 04/16/2024     Lab Results   Component Value Date    CHOL 146 10/17/2024    HDL 54 10/17/2024    HDL 50 11/11/2019    LDLCALC 72.8 10/17/2024    TRIG 96 10/17/2024            Assessment     1. PVC (premature ventricular contraction)        Plan:    69 y.o. male with PMH of hypertension, hyperlipidemia, h/o PVCs, h/o head and neck squamous cell carcinoma s/p remote surgery/radiation who presents to arrhythmia clinic to establish care.    Frequent Premature Ventricular Contractions: Asymptomatic and no evidence of systolic dysfunction at this time. Given high burden would recommend medical management to reduce burden. Change acebutolol to verapamil 240 mg daily. Stop amlodipine. Check Holter in   Stop acebutolol  Start verapamil 180 mg daily   Stop amlodipine  Follow up in 6 months with Holter in 5 months            Brian Peralta MD  [facility]  Electrophysiology, PGY-VII        "

## 2024-11-13 LAB
OHS QRS DURATION: 106 MS
OHS QTC CALCULATION: 408 MS

## 2024-11-19 ENCOUNTER — TELEPHONE (OUTPATIENT)
Dept: PHARMACY | Facility: CLINIC | Age: 69
End: 2024-11-19
Payer: MEDICARE

## 2024-11-19 NOTE — TELEPHONE ENCOUNTER
[facility]/Population Health Chart Review & Patient Outreach Details For Medication Adherence Project    Reason for Outreach Encounter: 3rd Party payor non-compliance report ([employer], [employer], C, etc)  2.  Patient Outreach Method: Reviewed patient chart   3.   Medication in question:   Hyperlipidemia Medications               atorvastatin (LIPITOR) 20 MG tablet Take 1 tablet (20 mg total) by mouth nightly.    omega-3 acid ethyl esters (LOVAZA) 1 gram capsule Take 2 capsules (2 g total) by mouth 2 (two) times daily.                  atorvastatin  last filled  9/18/24 for 90 day supply    4.  Reviewed and or Updates Made To: Patient Chart  5. Outreach Outcomes and/or actions taken: Patient filled medication and is on track to be adherent  Additional Notes:

## 2024-12-13 ENCOUNTER — TELEPHONE (OUTPATIENT)
Dept: PHARMACY | Facility: CLINIC | Age: 69
End: 2024-12-13
Payer: MEDICARE

## 2024-12-13 NOTE — TELEPHONE ENCOUNTER
[facility]/Population Health Chart Review & Patient Outreach Details For Medication Adherence Project    Reason for Outreach Encounter: 3rd Party payor non-compliance report ([employer], BCBS, C, etc)  2.  Patient Outreach Method: Reviewed patient chart  and [employer] message  3.   Medication in question:    Hyperlipidemia Medications               atorvastatin (LIPITOR) 20 MG tablet Take 1 tablet (20 mg total) by mouth nightly.    omega-3 acid ethyl esters (LOVAZA) 1 gram capsule Take 2 capsules (2 g total) by mouth 2 (two) times daily.                  Atorvastatin  last filled  9/18/24 for 90 day supply    4.  Reviewed and or Updates Made To: Patient Chart  5. Outreach Outcomes and/or actions taken: Patient filled medication and is on track to be adherent and Patient reminded to  prescription  Additional Notes:

## 2025-01-21 ENCOUNTER — TELEPHONE (OUTPATIENT)
Dept: PHARMACY | Facility: CLINIC | Age: 70
End: 2025-01-21
Payer: MEDICARE

## 2025-01-21 NOTE — TELEPHONE ENCOUNTER
[facility]/Population Health Chart Review & Patient Outreach Details For Medication Adherence Project    Reason for Outreach Encounter: 3rd Party payor non-compliance report ([employer], [employer], C, etc)  2.  Patient Outreach Method: Reviewed patient chart   3.   Medication in question:    Hyperlipidemia Medications               atorvastatin (LIPITOR) 20 MG tablet Take 1 tablet (20 mg total) by mouth nightly.    omega-3 acid ethyl esters (LOVAZA) 1 gram capsule Take 2 capsules (2 g total) by mouth 2 (two) times daily.                  atorvastatin  last filled  12/24/24 for 90 day supply    4.  Reviewed and or Updates Made To: Patient Chart  5. Outreach Outcomes and/or actions taken: Patient filled medication and is on track to be adherent  Additional Notes:

## 2025-01-30 ENCOUNTER — TELEPHONE (OUTPATIENT)
Dept: AUDIOLOGY | Facility: CLINIC | Age: 70
End: 2025-01-30
Payer: MEDICARE

## 2025-01-30 DIAGNOSIS — Z00.00 ENCOUNTER FOR MEDICARE ANNUAL WELLNESS EXAM: ICD-10-CM

## 2025-01-30 NOTE — TELEPHONE ENCOUNTER
Patient called to say that his right mold is not as comfortable as his left ear. He has tried to get used to it but when he wears the right mold he feels like his ear is filled with water. He denied any actual drainage. It was recommended we try to modify the mold in office or we can send it off for adjustments.     He opted to be scheduled on 2:30 pm on Tuesday, Feb 4th.

## 2025-02-04 ENCOUNTER — CLINICAL SUPPORT (OUTPATIENT)
Dept: AUDIOLOGY | Facility: CLINIC | Age: 70
End: 2025-02-04
Payer: MEDICARE

## 2025-02-04 DIAGNOSIS — H90.3 SENSORINEURAL HEARING LOSS, BILATERAL: Primary | ICD-10-CM

## 2025-02-04 NOTE — PROGRESS NOTES
HEARING AID FOLLOW-UP    Giovanni Moncada was seen today for a hearing aid follow-up visit.     Repair: Patient perceives excess occlusion  Action taken: modified mold with less occlusion using drill    Connected to software to check feedback; prompted a software upgrade. A software update was completed successfully as well as new feedback test for the right ear. Hearing aids were confirmed to be connected to patient's iPhone prior to leaving the visit.     Mr. Basurto was charged $75.00 for today's visit; he opted to pay with his [employer] flex card. He also requested a second  to be ordered and paid by his [employer] card as well. He is aware of my upcoming maternity leave and is aware of how to contact our office for any hearing aid needs.

## 2025-02-18 ENCOUNTER — TELEPHONE (OUTPATIENT)
Dept: UROLOGY | Facility: CLINIC | Age: 70
End: 2025-02-18
Payer: MEDICARE

## 2025-02-18 NOTE — TELEPHONE ENCOUNTER
----- Message from [employer] sent at 2/18/2025 10:57 AM CST -----  Name of Who is Calling: MARCOS WORKMAN [[id number]]What is the request in detail: Pt is requesting a call back to get rescheduled for annual visit. [facility] is not pulling up a schedule. Please assist. Can the clinic reply by [facility]: NoWhat Number to Call Back if not in [facility]:  [phone]

## 2025-03-20 ENCOUNTER — PATIENT MESSAGE (OUTPATIENT)
Dept: PRIMARY CARE CLINIC | Facility: CLINIC | Age: 70
End: 2025-03-20
Payer: MEDICARE

## 2025-03-21 DIAGNOSIS — I10 ESSENTIAL HYPERTENSION: ICD-10-CM

## 2025-03-21 RX ORDER — OLMESARTAN MEDOXOMIL 20 MG/1
20 TABLET ORAL NIGHTLY
Qty: 90 TABLET | Refills: 3 | Status: SHIPPED | OUTPATIENT
Start: 2025-03-21

## 2025-03-21 NOTE — TELEPHONE ENCOUNTER
Refill Routing Note   Medication(s) are not appropriate for processing by [facility] for the following reason(s):        Required vitals abnormal    [facility] action(s):  Defer               Appointments  past 12m or future 3m with PCP    Date Provider   Last Visit   10/25/2024 Arnel Flaherty MD   Next Visit   4/25/2025 Arnel Flaherty MD   ED visits in past 90 days: 0        Note composed:11:31 AM 03/21/2025

## 2025-03-21 NOTE — TELEPHONE ENCOUNTER
No care due was identified.  [facility] Embedded Care Due Messages. Reference number: [id number].   3/21/2025 9:44:02 AM CDT

## 2025-04-23 ENCOUNTER — TELEPHONE (OUTPATIENT)
Dept: PRIMARY CARE CLINIC | Facility: CLINIC | Age: 70
End: 2025-04-23
Payer: MEDICARE

## 2025-04-23 NOTE — TELEPHONE ENCOUNTER
----- Message from Jaleesa sent at 4/23/2025 10:16 AM CDT -----  Regarding: Lab orders  Good morning, patient has an appt Friday but his labs aren't in to be done. Pt states last visit they weren't done either and he is just trying to get in today or tomorrow so he can have them done for Friday. Can you please contact patient as soon as possible regarding this matter? Thank you in advance for your time.Thank you,Jaleesa BrothersPatient [phone]

## 2025-04-24 ENCOUNTER — LAB VISIT (OUTPATIENT)
Dept: LAB | Facility: HOSPITAL | Age: 70
End: 2025-04-24
Attending: INTERNAL MEDICINE
Payer: MEDICARE

## 2025-04-24 DIAGNOSIS — Z12.5 SCREENING FOR MALIGNANT NEOPLASM OF PROSTATE: ICD-10-CM

## 2025-04-24 DIAGNOSIS — Z11.3 SCREEN FOR STD (SEXUALLY TRANSMITTED DISEASE): ICD-10-CM

## 2025-04-24 DIAGNOSIS — Z85.89 HISTORY OF SQUAMOUS CELL CARCINOMA: ICD-10-CM

## 2025-04-24 DIAGNOSIS — N52.9 ERECTILE DYSFUNCTION, UNSPECIFIED ERECTILE DYSFUNCTION TYPE: ICD-10-CM

## 2025-04-24 DIAGNOSIS — E78.2 HYPERLIPIDEMIA, MIXED: ICD-10-CM

## 2025-04-24 LAB
ABSOLUTE EOSINOPHIL (OHS): 0.13 K/UL
ABSOLUTE MONOCYTE (OHS): 0.71 K/UL (ref 0.3–1)
ABSOLUTE NEUTROPHIL COUNT (OHS): 3.44 K/UL (ref 1.8–7.7)
ALBUMIN SERPL BCP-MCNC: 4.1 G/DL (ref 3.5–5.2)
ALP SERPL-CCNC: 94 UNIT/L (ref 40–150)
ALT SERPL W/O P-5'-P-CCNC: 13 UNIT/L (ref 10–44)
ANION GAP (OHS): 4 MMOL/L (ref 8–16)
AST SERPL-CCNC: 20 UNIT/L (ref 11–45)
BASOPHILS # BLD AUTO: 0.04 K/UL
BASOPHILS NFR BLD AUTO: 0.7 %
BILIRUB SERPL-MCNC: 0.8 MG/DL (ref 0.1–1)
BUN SERPL-MCNC: 13 MG/DL (ref 8–23)
CALCIUM SERPL-MCNC: 9.9 MG/DL (ref 8.7–10.5)
CHLORIDE SERPL-SCNC: 111 MMOL/L (ref 95–110)
CHOLEST SERPL-MCNC: 131 MG/DL (ref 120–199)
CHOLEST/HDLC SERPL: 2.5 {RATIO} (ref 2–5)
CO2 SERPL-SCNC: 26 MMOL/L (ref 23–29)
CREAT SERPL-MCNC: 0.8 MG/DL (ref 0.5–1.4)
ERYTHROCYTE [DISTWIDTH] IN BLOOD BY AUTOMATED COUNT: 13.3 % (ref 11.5–14.5)
GFR SERPLBLD CREATININE-BSD FMLA CKD-EPI: >60 ML/MIN/1.73/M2
GLUCOSE SERPL-MCNC: 96 MG/DL (ref 70–110)
HAV IGM SERPL QL IA: NORMAL
HBV CORE IGM SERPL QL IA: NORMAL
HBV SURFACE AG SERPL QL IA: NORMAL
HCT VFR BLD AUTO: 45.2 % (ref 40–54)
HCV AB SERPL QL IA: NORMAL
HDLC SERPL-MCNC: 52 MG/DL (ref 40–75)
HDLC SERPL: 39.7 % (ref 20–50)
HGB BLD-MCNC: 14.6 GM/DL (ref 14–18)
HIV 1+2 AB+HIV1 P24 AG SERPL QL IA: NORMAL
IMM GRANULOCYTES # BLD AUTO: 0.03 K/UL (ref 0–0.04)
IMM GRANULOCYTES NFR BLD AUTO: 0.5 % (ref 0–0.5)
LDLC SERPL CALC-MCNC: 66.8 MG/DL (ref 63–159)
LYMPHOCYTES # BLD AUTO: 1.12 K/UL (ref 1–4.8)
MCH RBC QN AUTO: 30.7 PG (ref 27–31)
MCHC RBC AUTO-ENTMCNC: 32.3 G/DL (ref 32–36)
MCV RBC AUTO: 95 FL (ref 82–98)
NONHDLC SERPL-MCNC: 79 MG/DL
NUCLEATED RBC (/100WBC) (OHS): 0 /100 WBC
PLATELET # BLD AUTO: 163 K/UL (ref 150–450)
PMV BLD AUTO: 11.7 FL (ref 9.2–12.9)
POTASSIUM SERPL-SCNC: 4.9 MMOL/L (ref 3.5–5.1)
PROT SERPL-MCNC: 6.9 GM/DL (ref 6–8.4)
PSA SERPL-MCNC: 1.37 NG/ML
RBC # BLD AUTO: 4.76 M/UL (ref 4.6–6.2)
RELATIVE EOSINOPHIL (OHS): 2.4 %
RELATIVE LYMPHOCYTE (OHS): 20.5 % (ref 18–48)
RELATIVE MONOCYTE (OHS): 13 % (ref 4–15)
RELATIVE NEUTROPHIL (OHS): 62.9 % (ref 38–73)
SODIUM SERPL-SCNC: 141 MMOL/L (ref 136–145)
T PALLIDUM IGG+IGM SER QL: NORMAL
TESTOST SERPL-MCNC: 219 NG/DL (ref 304–1227)
TRIGL SERPL-MCNC: 61 MG/DL (ref 30–150)
WBC # BLD AUTO: 5.47 K/UL (ref 3.9–12.7)

## 2025-04-24 PROCEDURE — 87591 N.GONORRHOEAE DNA AMP PROB: CPT

## 2025-04-24 PROCEDURE — 84155 ASSAY OF PROTEIN SERUM: CPT

## 2025-04-24 PROCEDURE — 80061 LIPID PANEL: CPT

## 2025-04-24 PROCEDURE — 85025 COMPLETE CBC W/AUTO DIFF WBC: CPT

## 2025-04-24 PROCEDURE — 84403 ASSAY OF TOTAL TESTOSTERONE: CPT

## 2025-04-24 PROCEDURE — 86696 HERPES SIMPLEX TYPE 2 TEST: CPT

## 2025-04-24 PROCEDURE — 80074 ACUTE HEPATITIS PANEL: CPT

## 2025-04-24 PROCEDURE — 87389 HIV-1 AG W/HIV-1&-2 AB AG IA: CPT

## 2025-04-24 PROCEDURE — 84153 ASSAY OF PSA TOTAL: CPT

## 2025-04-24 PROCEDURE — 86593 SYPHILIS TEST NON-TREP QUANT: CPT

## 2025-04-24 PROCEDURE — 36415 COLL VENOUS BLD VENIPUNCTURE: CPT

## 2025-04-25 ENCOUNTER — OFFICE VISIT (OUTPATIENT)
Dept: PRIMARY CARE CLINIC | Facility: CLINIC | Age: 70
End: 2025-04-25
Payer: MEDICARE

## 2025-04-25 VITALS
OXYGEN SATURATION: 98 % | DIASTOLIC BLOOD PRESSURE: 66 MMHG | SYSTOLIC BLOOD PRESSURE: 108 MMHG | HEART RATE: 48 BPM | HEIGHT: 70 IN | BODY MASS INDEX: 25.94 KG/M2 | WEIGHT: 181.19 LBS

## 2025-04-25 DIAGNOSIS — N40.0 BENIGN PROSTATIC HYPERPLASIA WITHOUT LOWER URINARY TRACT SYMPTOMS: ICD-10-CM

## 2025-04-25 DIAGNOSIS — E29.1 HYPOGONADISM MALE: ICD-10-CM

## 2025-04-25 DIAGNOSIS — I10 ESSENTIAL HYPERTENSION: Primary | ICD-10-CM

## 2025-04-25 DIAGNOSIS — N52.9 ERECTILE DYSFUNCTION, UNSPECIFIED ERECTILE DYSFUNCTION TYPE: ICD-10-CM

## 2025-04-25 DIAGNOSIS — K21.9 GERD WITHOUT ESOPHAGITIS: ICD-10-CM

## 2025-04-25 DIAGNOSIS — E78.2 HYPERLIPIDEMIA, MIXED: ICD-10-CM

## 2025-04-25 DIAGNOSIS — R41.3 OTHER AMNESIA: ICD-10-CM

## 2025-04-25 LAB
C TRACH DNA SPEC QL NAA+PROBE: NOT DETECTED
CTGC SOURCE (OHS) ORD-325: NORMAL
HSV1 IGG SERPL QL IA: NEGATIVE
HSV2 IGG SERPL QL IA: POSITIVE
N GONORRHOEA DNA UR QL NAA+PROBE: NOT DETECTED

## 2025-04-25 PROCEDURE — 99999 PR PBB SHADOW E&M-EST. PATIENT-LVL V: CPT | Mod: PBBFAC,,, | Performed by: INTERNAL MEDICINE

## 2025-04-25 NOTE — PROGRESS NOTES
[facility] Note    Chief Complaint    No chief complaint on file.      History of Present Illness      History of Present Illness    CHIEF COMPLAINT:  Mr. Moncada presents today for follow up of low testosterone    TESTOSTERONE AND SEXUAL FUNCTION:  He reports feeling well overall despite low testosterone. He achieves erections at 4 AM during urination. He currently uses Viagra for erectile dysfunction and Cialis, which takes approximately 6 hours to become effective.    MEMORY:  He reports increased forgetfulness. Memory evaluation was performed 6 months ago. Family history is significant for brother with Alzheimer's disease.    LABS:  Testosterone level was 219. PSA was normal.      ROS:  Neurological: +memory loss, +forgetfulness  Male Genitourinary: +erectile dysfunction       HTN: BP at goal on benicar, amlodipine, acebutolol, doxazosin.  HLD: controlled on atorvastatin.  LDL 67.  Sees Dr. Brown.  ED: Sees Dr. Aquino, urology.  On viagra  GERD:  Sees Dr. Garcia. Controlled on omeprazole.  No new or worsening symptoms.   Cardiac arrhythmia: Sees Dr. Brown.  Previous diagnosed during stress test.  On acebutolol for rate control.  BPH: Sees Dr. Aquino.  Controlled on doxazosin.  No new or worsening symptoms  Hx of squamous cell carcinoma neck s/p resection: no new changes.  Flu shot declines.  Td 2005.  Pneumonia vaccines declines.  Shingles vaccine declines.  COVID vaccine UTD.  Cscope 2021, Dr. Mendes, polyps, 5 yr interval.     Assessment/Plan     Giovanni Moncada is a 69 y.o.male with:    Assessment & Plan    Testosterone level is low at 219. Plan to recheck testosterone level to confirm low status, as 2 consecutive low levels are required for insurance coverage of testosterone supplementation. If testosterone remains low, will initiate testosterone replacement therapy. Discussed options of daily gel application or biweekly injections, including differences between methods. Reviewed  comprehensive blood panel results. Noted slightly elevated glucose, but within acceptable range. Other values, including PSA, were unremarkable.     SICK SINUS SYNDROME:  - Performed a physical exam, including auscultation of the patient's heartbeat.  - No irregularities or murmurs were detected in the heart rhythm.  - Noted the patient is taking sildenafil and tadalafil for erectile dysfunction, which may have cardiovascular effects and should be monitored.    TESTICULAR HYPOFUNCTION:  - Emphasized the importance of early morning testosterone testing for accurate low-level confirmation.  - Ordered a repeat early morning testosterone level draw, preferably to be done tomorrow or Monday.  - Instructed the patient to contact the office to review results for further management decisions.  - Noted the patient's current testosterone level is 219 ng/dL, below the 250 ng/dL threshold for insurance coverage of supplementation.  - Discussed two methods of testosterone supplementation: daily gel application or biweekly injections.  - Planned to start supplementation if second test confirms low levels.    ERECTILE DYSFUNCTION:  - Advised to continue sildenafil and tadalafil as needed for erectile dysfunction.  - Noted the patient still experiences early morning erections.  - Documented that tadalafil takes about 6 hours to work for the patient and can be effective for up to 12 hours or a few days.    MEMORY ISSUES:  - Ordered brain MRI to evaluate reported memory concerns, particularly given family history of Alzheimer's.  - Instructed the patient to contact the office to review MRI results for further management decisions.  - Noted patient reports forgetting more than expected and had a memory assessment about 6 months ago.  - Provided information on new P-tau marker for early dementia detection.  - Noted brother has severe Alzheimer's.    GENERAL HEALTH RECOMMENDATIONS:  - Recommend continuing to walk 12,000-15,000 steps  daily.  - Suggested eating raw blueberries for potential health benefits, explaining their antioxidant properties and potential benefits for inflammation reduction and cognitive function in elderly patients.    FOLLOW-UP:  - Contact the office to review results of comprehensive blood panel if patient proceeds with outside lab testing.         1. Essential hypertension    2. Hyperlipidemia, mixed    3. Benign prostatic hyperplasia without lower urinary tract symptoms    4. GERD without esophagitis    5. Erectile dysfunction, unspecified erectile dysfunction type    6. Hypogonadism male  - Testosterone; Future    7. Other amnesia  - MRI Brain Without Contrast; Future      Chronic conditions status updated as per HPI.  Other than changes above, cont current medications and maintain follow up with specialists.  No follow-ups on file.    Future Appointments   Date Time Provider Department Center   5/6/2025  8:00 AM [facility]  MRI1 [facility] MRI [place]   5/26/2025 11:00 AM LAB, [facility] LABDRAW [facility]   6/10/2025  2:00 PM Arnav Aquino MD [facility] UROLOGY [facility]   6/12/2025  9:20 AM Carmelina Juan MD [facility] CARDIO [place]   10/31/2025  9:30 AM Arnel Flaherty MD [facility] PRICRE [place]           Past Medical History:  Past Medical History:   Diagnosis Date    Myalgia     PRA INJECT TRIGGER POINTS, > 3  NC METHYLPREDNISOLONE 40 MG INJ       Past Surgical History:   has a past surgical history that includes Neck surgery; Cataract extraction, bilateral (02/2020);  colonoscopy (01/07/2016); colonoscopy (01/26/2021); Eye surgery (2020); Hernia repair (2020); Tonsillectomy (1964); Cholecystectomy (2020); and Esophagogastroduodenoscopy (N/A, 11/17/2023).    Family History:  family history includes Cancer in his mother; Heart disease in his father.     Social History:  Social History[1]    Medications:  Encounter Medications[2]    Allergies:  Review of patient's allergies indicates:   Allergen Reactions    Lamisil  "[terbinafine] Hives       Health Maintenance:  Immunization History   Administered Date(s) Administered    COVID-19, MRNA, LN-S, PF (Pfizer) (Purple Cap) 07/22/2021, 08/12/2021      Health Maintenance   Topic Date Due    Hemoglobin A1c (Diabetic Prevention Screening)  Never done    RSV Vaccine (Age 60+ and Pregnant patients) (1 - Risk 60-74 years 1-dose series) Never done    Influenza Vaccine (1) 06/30/2025 (Originally 9/1/2024)    TETANUS VACCINE  11/06/2025 (Originally 8/25/1972)    Shingles Vaccine (1 of 2) 11/06/2025 (Originally 8/25/1973)    COVID-19 Vaccine (3 - Pfizer risk series) 11/06/2025 (Originally 9/9/2021)    Pneumococcal Vaccines (Age 50+) (1 of 2 - PCV) 11/06/2025 (Originally 8/25/1973)    Lipid Panel  04/24/2030    Colorectal Cancer Screening  01/26/2031    Hepatitis C Screening  Completed        Physical Exam      Vital Signs  Pulse: (!) 48  SpO2: 98 %  BP: 108/66  BP Location: Right arm  Patient Position: Sitting  Pain Score: 0-No pain  Height and Weight  Height: 5' 10" (177.8 cm)  Weight: 82.2 kg (181 lb 3.5 oz)  BSA (Calculated - sq m): 2.01 sq meters  BMI (Calculated): 26  Weight in (lb) to have BMI = 25: 173.9]    Physical Exam    General: No acute distress. Well-developed. Well-nourished.  Eyes: EOMI. Sclerae anicteric.  HENT: Normocephalic. Atraumatic. Nares patent. Moist oral mucosa.  Ears: Bilateral TMs clear. Bilateral EACs clear.  Cardiovascular: Regular rate. Regular rhythm. No murmurs. No rubs. No gallops. Normal S1, S2.  Respiratory: Normal respiratory effort. Clear to auscultation bilaterally. No rales. No rhonchi. No wheezing.  Abdomen: Soft. Non-tender. Non-distended. Normoactive bowel sounds.  Musculoskeletal: No  obvious deformity.  Extremities: No lower extremity edema.  Neurological: Alert & oriented x3. No slurred speech. Normal gait.  Psychiatric: Normal mood. Normal affect. Good insight. Good judgment.  Skin: Warm. Dry. No rash.         Physical Exam  Vitals reviewed. "   Constitutional:       Appearance: He is well-developed.   HENT:      Head: Normocephalic and atraumatic.      Right Ear: External ear normal.      Left Ear: External ear normal.   Cardiovascular:      Rate and Rhythm: Normal rate and regular rhythm.      Heart sounds: Normal heart sounds. No murmur heard.  Pulmonary:      Effort: Pulmonary effort is normal.      Breath sounds: Normal breath sounds. No wheezing or rales.   Abdominal:      General: Bowel sounds are normal.      Palpations: Abdomen is soft.         Laboratory:    Results              CBC:  Recent Labs   Lab 04/16/24  0909 04/24/25  0958   WBC 5.48 5.47   RBC 4.80 4.76   Hemoglobin 14.7  --    HGB  --  14.6   Hematocrit 45.2  --    HCT  --  45.2   Platelet Count  --  163   Platelets 173  --    MCV 94 95   MCH 30.6 30.7   MCHC 32.5 32.3     CMP:  Recent Labs   Lab 04/16/24  0909 10/17/24  1014 04/24/25  0958   Glucose 113 H 107 96   Calcium 10.1 10.1 9.9   Albumin 4.0 4.3 4.1   Protein Total  --   --  6.9   Total Protein 7.0 7.2  --    Sodium 140 139 141   Potassium 4.4 4.6 4.9   CO2 24 24 26   Chloride 107 107 111 H   BUN 19 17 13   Alkaline Phosphatase 78 85  --    ALP  --   --  94   ALT 18 18 13   AST 20 18 20   Total Bilirubin 0.7 0.7  --    Bilirubin Total  --   --  0.8     URINALYSIS:  Recent Labs   Lab 02/01/23  0941   Color, UA Yellow   Specific Gravity, UA 1.025   pH, UA 6.0   Protein, UA Negative   Nitrite, UA Negative   Leukocytes, UA Negative      LIPIDS:  Recent Labs   Lab 04/16/24  0909 10/17/24  1014 04/24/25  0958   HDL 53 54  --    HDL Cholesterol  --   --  52   Cholesterol Total  --   --  131   Cholesterol 147 146  --    Triglycerides 120 96  --    Triglyceride  --   --  61   LDL Cholesterol 70.0 72.8 66.8   HDL/Cholesterol Ratio 36.1 37.0 39.7   Non-HDL Cholesterol 94 92  --    Non HDL Cholesterol  --   --  79   Total Cholesterol/HDL Ratio 2.8 2.7  --    Cholesterol/HDL Ratio  --   --  2.5     TSH:      A1C:            This note was  generated with the assistance of ambient listening technology. Verbal consent was obtained by the patient and accompanying visitor(s) for the recording of patient appointment to facilitate this note. I attest to having reviewed and edited the generated note for accuracy, though some syntax or spelling errors may persist. Please contact the author of this note for any clarification.      Arnel Flaherty MD  [facility]                       [1]   Social History  Tobacco Use    Smoking status: Never    Smokeless tobacco: Never   Substance Use Topics    Alcohol use: Yes     Alcohol/week: 8.0 standard drinks of alcohol     Types: 4 Glasses of wine, 4 Drinks containing 0.5 oz of alcohol per week    Drug use: Yes     Frequency: 3.0 times per week     Types: Marijuana     Comment: liquid marijuana, before bed   [2]   Outpatient Encounter Medications as of 4/25/2025   Medication Sig Dispense Refill    a-cysteine/arg zinc/glut/mv-mn (L GLUTAMINE-N ACET-ARG-VIT-MIN ORAL) Take by mouth.      ascorbic acid, vitamin C, (VITAMIN C) 1000 MG tablet Take 1,000 mg by mouth.      aspirin (ECOTRIN) 81 MG EC tablet Take 81 mg by mouth once daily. (Patient not taking: Reported on 11/12/2024)      atorvastatin (LIPITOR) 20 MG tablet Take 1 tablet (20 mg total) by mouth nightly. 90 tablet 3    bacillus coagulans-inulin 1 billion-250 cell-mg Cap Take by mouth.      cholecalciferol, vitamin D3, 5,000 unit Tab Take by mouth.      ciclopirox (LOPROX) 0.77 % Crea Apply topically 2 (two) times daily. Use for 2-4 weeks to rash on buttocks and to rash on feet when needed until resolved (Patient not taking: Reported on 11/6/2024) 90 g 1    clobetasoL (TEMOVATE) 0.05 % external solution Apply to affected area on scalp daily for 2 weeks, then three times weekly (Patient not taking: Reported on 11/6/2024) 50 mL 5    coenzyme Q10 100 mg capsule Take 100 mg by mouth once daily.      cyanocobalamin (VITAMIN B-12) 1000 MCG tablet Take 100 mcg by  mouth once daily.      doxazosin (CARDURA) 1 MG tablet Take 1 tablet (1 mg total) by mouth nightly. 90 tablet 3    fluticasone propionate (FLONASE) 50 mcg/actuation nasal spray 1 spray (50 mcg total) by Each Nostril route once daily. (Patient not taking: Reported on 11/6/2024) 48 g 3    glucosamine-chondroit-vit C-Mn 500-400 mg capsule Take by mouth.      GLUCOSAMINE/CHONDR MARI A SOD (OSTEO BI-FLEX ORAL) Take 1,500 mg by mouth.      ketoconazole (NIZORAL) 2 % shampoo Apply topically 3 (three) times a week. (Patient not taking: Reported on 11/6/2024) 120 mL 5    magnesium oxide (MAG-OX) 400 mg (241.3 mg magnesium) tablet Take 1 twice a day for 1 month, then 1 daily; if diarrhea occurs, decrease dose  0    olmesartan (BENICAR) 20 MG tablet TAKE 1 TABLET BY MOUTH EVERY EVENING 90 tablet 3    omega-3 acid ethyl esters (LOVAZA) 1 gram capsule Take 2 capsules (2 g total) by mouth 2 (two) times daily. 360 capsule 3    omeprazole (PRILOSEC) 20 MG capsule TAKE 1 CAPSULE BY MOUTH DAILY 90 capsule 3    sildenafiL (VIAGRA) 100 MG tablet Take 1 tablet (100 mg total) by mouth daily as needed for Erectile Dysfunction. (Patient not taking: Reported on 11/6/2024) 90 tablet 3    tadalafiL (CIALIS) 20 MG Tab Take 1 tablet (20 mg total) by mouth daily as needed. 90 tablet 3    UNABLE TO FIND Chew-8-1 CBD:THC (wellness chews) 1 chew once to twice a day (Patient not taking: Reported on 11/6/2024)      UNABLE TO FIND 1:1 cbd:thc tincture 25ml to 1ml under tongue 3xday as needed      vardenafiL (LEVITRA) 20 MG tablet       verapamiL (CALAN-SR) 180 MG CR tablet Take 1 tablet (180 mg total) by mouth every evening. 90 tablet 3    vitamin A acetate 10,000 unit Subl Place 2,400 mcg under the tongue.      zinc acetate 50 mg (zinc) Cap Take 50 mg by mouth.       No facility-administered encounter medications on file as of 4/25/2025.

## 2025-04-28 ENCOUNTER — LAB VISIT (OUTPATIENT)
Dept: LAB | Facility: HOSPITAL | Age: 70
End: 2025-04-28
Attending: INTERNAL MEDICINE
Payer: MEDICARE

## 2025-04-28 DIAGNOSIS — E29.1 HYPOGONADISM MALE: ICD-10-CM

## 2025-04-28 LAB — TESTOST SERPL-MCNC: 487 NG/DL (ref 304–1227)

## 2025-04-28 PROCEDURE — 84403 ASSAY OF TOTAL TESTOSTERONE: CPT

## 2025-04-28 PROCEDURE — 36415 COLL VENOUS BLD VENIPUNCTURE: CPT

## 2025-04-29 ENCOUNTER — RESULTS FOLLOW-UP (OUTPATIENT)
Dept: PRIMARY CARE CLINIC | Facility: CLINIC | Age: 70
End: 2025-04-29

## 2025-04-29 DIAGNOSIS — E29.1 HYPOGONADISM MALE: Primary | ICD-10-CM

## 2025-04-29 NOTE — PROGRESS NOTES
Testosterone came back in normal range.  Placed another order to check again for clinically significant low testosterone (< 250).

## 2025-05-06 ENCOUNTER — HOSPITAL ENCOUNTER (OUTPATIENT)
Dept: RADIOLOGY | Facility: HOSPITAL | Age: 70
Discharge: HOME | End: 2025-05-06
Attending: INTERNAL MEDICINE
Payer: MEDICARE

## 2025-05-06 DIAGNOSIS — R41.3 OTHER AMNESIA: ICD-10-CM

## 2025-05-06 DIAGNOSIS — I10 ESSENTIAL HYPERTENSION: ICD-10-CM

## 2025-05-06 PROCEDURE — 70551 MRI BRAIN STEM W/O DYE: CPT | Mod: TC

## 2025-05-06 PROCEDURE — 70551 MRI BRAIN STEM W/O DYE: CPT | Mod: 26,,, | Performed by: RADIOLOGY

## 2025-05-06 RX ORDER — DOXAZOSIN 1 MG/1
1 TABLET ORAL NIGHTLY
Qty: 90 TABLET | Refills: 3 | Status: SHIPPED | OUTPATIENT
Start: 2025-05-06

## 2025-05-06 NOTE — TELEPHONE ENCOUNTER
No care due was identified.  [facility] Embedded Care Due Messages. Reference number: [id number].   5/06/2025 9:52:07 AM CDT

## 2025-05-06 NOTE — TELEPHONE ENCOUNTER
Refill Decision Note   Giovanni Moncada  is requesting a refill authorization.  Brief Assessment and Rationale for Refill:  Approve     Medication Therapy Plan:        Comments:     Note composed:12:05 PM 05/06/2025

## 2025-06-09 NOTE — PROGRESS NOTES
Subjective:      Giovanni Moncada is a 69 y.o. male who returns today regarding his     Occasional slow stream in AM when he has an erections    Otherwise no LUTS  .    The following portions of the patient's history were reviewed and updated as appropriate: allergies, current medications, past family history, past medical history, past social history, past surgical history and problem list.    Review of Systems  Pertinent items are noted in HPI.  A comprehensive multipoint review of systems was negative except as otherwise stated in the HPI.    Past Medical History:   Diagnosis Date    Myalgia     PRA INJECT TRIGGER POINTS, > 3  [place] METHYLPREDNISOLONE 40 MG INJ     Past Surgical History:   Procedure Laterality Date     colonoscopy  01/07/2016    one 4 mm poly in the cecum. one 3 mm poly in the ascending colon. Repeat colonosocpy in 5 years    CATARACT EXTRACTION, BILATERAL  02/2020    CHOLECYSTECTOMY  2020    colonoscopy  01/26/2021    ESOPHAGOGASTRODUODENOSCOPY N/A 11/17/2023    Procedure: EGD (ESOPHAGOGASTRODUODENOSCOPY);  Surgeon: Arnav Brown MD;  Location: [address]);  Service: Endoscopy;  Laterality: N/A;  referral dr gordon  pt on liquid marijuana  prep instructions sent to pt via portal    EYE SURGERY  2020    HERNIA REPAIR  2020    NECK SURGERY      TONSILLECTOMY  1964       Review of patient's allergies indicates:   Allergen Reactions    Lamisil [terbinafine] Hives          Objective:   Vitals: There were no vitals taken for this visit.    Physical Exam   General: alert and oriented, no acute distress  Respiratory: Symmetric expansion, non-labored breathing  Cardiovascular: no peripheral edema  Abdomen: non distended  Skin: normal coloration and turgor, no rashes, no suspicious skin lesions noted  Neuro: no gross deficits  Psych: normal judgment and insight, normal mood/affect, and non-anxious  ES 40g  No nodules    Physical Exam    Lab Review   Urinalysis demonstrates no specimen    Lab  Results   Component Value Date    WBC 5.47 04/24/2025    HGB 14.6 04/24/2025    HGB 14.7 04/16/2024    HCT 45.2 04/24/2025    HCT 45.2 04/16/2024    MCV 95 04/24/2025    MCV 94 04/16/2024     04/24/2025     04/16/2024     Lab Results   Component Value Date    CREATININE 0.8 04/24/2025    BUN 13 04/24/2025     Lab Results   Component Value Date    PSA 1.37 04/24/2025    PSA 0.90 04/16/2024    PSA 1.1 02/01/2023    PSADIAG 2.1 03/11/2022    PSADIAG 0.96 07/06/2020    PSADIAG 1.5 07/16/2018         Imaging  -    Assessment and Plan:   Enlarged prostate without lower urinary tract symptoms (luts)         Erectile dysfunction, unspecified erectile dysfunction type     Refill sildenafil and cialis  Instructed him not to take both on the same day     See PCP and cards re BP     rtc 1 yr with psa

## 2025-06-10 ENCOUNTER — OFFICE VISIT (OUTPATIENT)
Dept: UROLOGY | Facility: CLINIC | Age: 70
End: 2025-06-10
Payer: MEDICARE

## 2025-06-10 VITALS
HEIGHT: 70 IN | SYSTOLIC BLOOD PRESSURE: 105 MMHG | OXYGEN SATURATION: 96 % | BODY MASS INDEX: 25.21 KG/M2 | DIASTOLIC BLOOD PRESSURE: 60 MMHG | HEART RATE: 59 BPM | WEIGHT: 176.13 LBS

## 2025-06-10 DIAGNOSIS — N40.0 ENLARGED PROSTATE WITHOUT LOWER URINARY TRACT SYMPTOMS (LUTS): Primary | ICD-10-CM

## 2025-06-10 PROCEDURE — 1101F PT FALLS ASSESS-DOCD LE1/YR: CPT | Mod: CPTII,S$GLB,, | Performed by: UROLOGY

## 2025-06-10 PROCEDURE — 1126F AMNT PAIN NOTED NONE PRSNT: CPT | Mod: CPTII,S$GLB,, | Performed by: UROLOGY

## 2025-06-10 PROCEDURE — 3078F DIAST BP <80 MM HG: CPT | Mod: CPTII,S$GLB,, | Performed by: UROLOGY

## 2025-06-10 PROCEDURE — 1159F MED LIST DOCD IN RCRD: CPT | Mod: CPTII,S$GLB,, | Performed by: UROLOGY

## 2025-06-10 PROCEDURE — 3288F FALL RISK ASSESSMENT DOCD: CPT | Mod: CPTII,S$GLB,, | Performed by: UROLOGY

## 2025-06-10 PROCEDURE — 3008F BODY MASS INDEX DOCD: CPT | Mod: CPTII,S$GLB,, | Performed by: UROLOGY

## 2025-06-10 PROCEDURE — 3074F SYST BP LT 130 MM HG: CPT | Mod: CPTII,S$GLB,, | Performed by: UROLOGY

## 2025-06-10 PROCEDURE — 99214 OFFICE O/P EST MOD 30 MIN: CPT | Mod: S$GLB,,, | Performed by: UROLOGY

## 2025-06-10 PROCEDURE — 4010F ACE/ARB THERAPY RXD/TAKEN: CPT | Mod: CPTII,S$GLB,, | Performed by: UROLOGY

## 2025-06-10 RX ORDER — TADALAFIL 20 MG/1
20 TABLET ORAL DAILY PRN
Qty: 90 TABLET | Refills: 3 | Status: SHIPPED | OUTPATIENT
Start: 2025-06-10 | End: 2026-06-10

## 2025-06-12 ENCOUNTER — OFFICE VISIT (OUTPATIENT)
Dept: CARDIOLOGY | Facility: CLINIC | Age: 70
End: 2025-06-12
Payer: MEDICARE

## 2025-06-12 VITALS
WEIGHT: 174.63 LBS | SYSTOLIC BLOOD PRESSURE: 136 MMHG | HEIGHT: 70 IN | HEART RATE: 54 BPM | DIASTOLIC BLOOD PRESSURE: 74 MMHG | BODY MASS INDEX: 25 KG/M2

## 2025-06-12 DIAGNOSIS — I49.3 PVC (PREMATURE VENTRICULAR CONTRACTION): Primary | ICD-10-CM

## 2025-06-12 DIAGNOSIS — E78.2 HYPERLIPIDEMIA, MIXED: ICD-10-CM

## 2025-06-12 DIAGNOSIS — I10 ESSENTIAL HYPERTENSION: ICD-10-CM

## 2025-06-12 PROBLEM — I49.5 SICK SINUS SYNDROME: Status: RESOLVED | Noted: 2025-06-12 | Resolved: 2025-06-12

## 2025-06-12 PROBLEM — I49.5 SICK SINUS SYNDROME: Status: ACTIVE | Noted: 2025-06-12

## 2025-06-12 PROCEDURE — 99999 PR PBB SHADOW E&M-EST. PATIENT-LVL IV: CPT | Mod: PBBFAC,,, | Performed by: INTERNAL MEDICINE

## 2025-06-12 NOTE — PROGRESS NOTES
HISTORY:    69-year-old male with a history of hypertension, hyperlipidemia, PVCs, SCC of the neck status post surgery/radiation '00s, DDD presenting for follow-up.     The patient denies any symptoms of chest pain, shortness of breath, or dyspnea on exertion.     Denies palpitations at this time. Previously had palpitations that were non-limiting.     Activity levels are excellent.  The patient walks 12,000-19,000 steps per day.  Owns a lot of property in the [facility] and walks the properties a lot. Eats healthy, avoids processed foods.     The patient denies any previous history of myocardial infarction, coronary artery disease, peripheral arterial disease, stroke, congestive heart failure, or cardiomyopathy.    Tolerates verapamil 180 mg x 1, olmesartan 20 x 1, atorvastatin 20 x 1. Also on doxazosin. Takes a variety of vitamin supplements.     PHYSICAL EXAM:    Vitals:    06/12/25 0926   BP: 136/74   Pulse: (!) 54         NAD, A+Ox3.  No jvd, no bruit.  Regular w some ectopy nml s1,s2. No murmurs.  CTA B no wheezes or crackles.  No edema.    LABS/STUDIES (imaging reviewed during clinic visit):    April 2025 CBC and CMP normal.  /HDL 52/LDL 66/TG 61.   ECG November 2024 sinus rhythm PVCs in a pattern of bigeminy.  May 2024 sinus rhythm with no Q-waves or ST changes.  February 2023 sinus rhythm with PVCs.    Holter May 2024 sinus rhythm with average heart rate of 61 beats per minute.  22% PVC burden.  No symptoms reported.  TTE July 2024 normal LV size and function with EF of 55-60%.  Normal diastology.  Coronary calcium score February 2023 Agatston score of 0.  Carotid 2023 bilateral carotid atherosclerosis with no evidence of significant ICA disease bilaterally.    ASSESSMENT & PLAN:    1. PVC (premature ventricular contraction)    2. Hyperlipidemia, mixed    3. Essential hypertension          Orders Placed This Encounter    Holter monitor - 24 hour    Echo            Asymptomatic PVCs on verapamil  Where Is Your Psoriasis Located?: Back, right leg, ankle - resolved\\nScalp 180x1. Normal LVEF. 22% PVC burden, higher burden between 6am and 11am previously. Now on verapamil 180x1. Follows w EPS. Repeat holter/TTE.     Hypertension controlled on verapamil 180x1 and olmesartan 20x1.    Hyperlipidemia on atorvastatin 20x1.     Follow up in about 1 year (around 6/12/2026).        Carmelina Juan MD

## 2025-06-27 DIAGNOSIS — E78.2 HYPERLIPIDEMIA, MIXED: ICD-10-CM

## 2025-06-27 RX ORDER — ATORVASTATIN CALCIUM 20 MG/1
20 TABLET, FILM COATED ORAL NIGHTLY
Qty: 90 TABLET | Refills: 3 | Status: SHIPPED | OUTPATIENT
Start: 2025-06-27

## 2025-06-30 ENCOUNTER — PATIENT MESSAGE (OUTPATIENT)
Dept: ADMINISTRATIVE | Facility: HOSPITAL | Age: 70
End: 2025-06-30
Payer: MEDICARE

## 2025-07-15 ENCOUNTER — HOSPITAL ENCOUNTER (OUTPATIENT)
Dept: CARDIOLOGY | Facility: HOSPITAL | Age: 70
Discharge: HOME | End: 2025-07-15
Attending: INTERNAL MEDICINE
Payer: MEDICARE

## 2025-07-15 DIAGNOSIS — I49.3 PVC (PREMATURE VENTRICULAR CONTRACTION): ICD-10-CM

## 2025-07-15 PROCEDURE — 93227 XTRNL ECG REC<48 HR R&I: CPT | Mod: ,,, | Performed by: INTERNAL MEDICINE

## 2025-07-15 PROCEDURE — 93226 XTRNL ECG REC<48 HR SCAN A/R: CPT

## 2025-07-18 LAB
OHS CV EVENT MONITOR DAY: 0
OHS CV HOLTER LENGTH DECIMAL HOURS: 23.98
OHS CV HOLTER LENGTH HOURS: 23
OHS CV HOLTER LENGTH MINUTES: 59
OHS CV HOLTER SINUS AVERAGE HR: 65
OHS CV HOLTER SINUS MAX HR: 102
OHS CV HOLTER SINUS MIN HR: 36

## 2025-07-22 ENCOUNTER — HOSPITAL ENCOUNTER (OUTPATIENT)
Dept: CARDIOLOGY | Facility: HOSPITAL | Age: 70
Discharge: HOME | End: 2025-07-22
Attending: INTERNAL MEDICINE
Payer: MEDICARE

## 2025-07-22 VITALS
HEIGHT: 70 IN | SYSTOLIC BLOOD PRESSURE: 122 MMHG | DIASTOLIC BLOOD PRESSURE: 70 MMHG | BODY MASS INDEX: 24.91 KG/M2 | WEIGHT: 174 LBS | HEART RATE: 52 BPM

## 2025-07-22 DIAGNOSIS — I49.3 PVC (PREMATURE VENTRICULAR CONTRACTION): ICD-10-CM

## 2025-07-22 LAB
AORTIC SIZE INDEX (SOV): 1.6 CM/M2
AORTIC SIZE INDEX: 1.6 CM/M2
ASCENDING AORTA: 3.2 CM
AV AREA BY CONTINUOUS VTI: 3.6 CM2
AV INDEX (PROSTH): 0.78
AV LVOT MEAN GRADIENT: 3 MMHG
AV LVOT PEAK GRADIENT: 6 MMHG
AV MEAN GRADIENT: 5 MMHG
AV PEAK GRADIENT: 10 MMHG
AV VALVE AREA BY VELOCITY RATIO: 3.4 CM²
AV VALVE AREA: 3.5 CM2
AV VELOCITY RATIO: 0.75
BSA FOR ECHO PROCEDURE: 1.97 M2
CV ECHO LV RWT: 0.28 CM
DOP CALC AO PEAK VEL: 1.6 M/S
DOP CALC AO VTI: 34.5 CM
DOP CALC LVOT AREA: 4.5 CM2
DOP CALC LVOT DIAMETER: 2.4 CM
DOP CALC LVOT PEAK VEL: 1.2 M/S
DOP CALC LVOT STROKE VOLUME: 122.1 CM3
DOP CALC RVOT AREA: 3.49 CM2
DOP CALC RVOT DIAMETER: 2.11 CM
DOP CALCLVOT PEAK VEL VTI: 27 CM
ECHO EF ESTIMATED: 66 %
ECHO LV POSTERIOR WALL: 0.8 CM (ref 0.6–1.1)
FRACTIONAL SHORTENING: 36.8 % (ref 28–44)
HR MV ECHO: 52 BPM
INTERVENTRICULAR SEPTUM: 0.8 CM (ref 0.6–1.1)
IVC DIAMETER: 1.45 CM
LA MAJOR: 5.1 CM
LA MINOR: 4.9 CM
LA WIDTH: 3 CM
LEFT ATRIUM SIZE: 4 CM
LEFT ATRIUM VOLUME INDEX MOD: 25 ML/M2
LEFT ATRIUM VOLUME INDEX: 26 ML/M2
LEFT ATRIUM VOLUME MOD: 50 ML
LEFT ATRIUM VOLUME: 51 CM3
LEFT INTERNAL DIMENSION IN SYSTOLE: 3.6 CM (ref 2.1–4)
LEFT VENTRICLE DIASTOLIC VOLUME INDEX: 80.2 ML/M2
LEFT VENTRICLE DIASTOLIC VOLUME: 158 ML
LEFT VENTRICLE MASS INDEX: 86.4 G/M2
LEFT VENTRICLE SYSTOLIC VOLUME INDEX: 26.9 ML/M2
LEFT VENTRICLE SYSTOLIC VOLUME: 53 ML
LEFT VENTRICULAR INTERNAL DIMENSION IN DIASTOLE: 5.7 CM (ref 3.5–6)
LEFT VENTRICULAR MASS: 170.2 G
Lab: 1.8 CM/M
Lab: 1.8 CM/M
MV A" WAVE DURATION": 148.43 MS
OHS CV CPX PATIENT HEIGHT IN: 70
OHS CV RV/LV RATIO: 0.56 CM
PISA TR MAX VEL: 2.4 M/S
PULM VEIN A" WAVE DURATION": 148.43 MS
PULM VEIN S/D RATIO: 1.36
PULMONIC VEIN PEAK A VELOCITY: 0.3 M/S
PV PEAK D VEL: 0.45 M/S
PV PEAK S VEL: 0.61 M/S
RA MAJOR: 4.4 CM
RA PRESSURE ESTIMATED: 3 MMHG
RA WIDTH: 2.77 CM
RIGHT ATRIAL AREA: 16.9 CM2
RIGHT VENTRICLE DIASTOLIC BASEL DIMENSION: 3.2 CM
RV TB RVSP: 5 MMHG
RV TISSUE DOPPLER FREE WALL SYSTOLIC VELOCITY 1 (APICAL 4 CHAMBER VIEW): 19.12 CM/S
SINUS: 3.2 CM
STJ: 3.1 CM
TDI LATERAL: 0.11 M/S
TDI SEPTAL: 0.09 M/S
TDI: 0.1 M/S
TRICUSPID ANNULAR PLANE SYSTOLIC EXCURSION: 2.6 CM
TV PEAK GRADIENT: 24 MMHG
TV REST PULMONARY ARTERY PRESSURE: 26 MMHG
Z-SCORE OF LEFT VENTRICULAR DIMENSION IN END DIASTOLE: 0.07
Z-SCORE OF LEFT VENTRICULAR DIMENSION IN END SYSTOLE: 0.27

## 2025-07-22 PROCEDURE — 93306 TTE W/DOPPLER COMPLETE: CPT | Mod: 26,,, | Performed by: INTERNAL MEDICINE

## 2025-07-22 PROCEDURE — 93306 TTE W/DOPPLER COMPLETE: CPT
